# Patient Record
Sex: FEMALE | Race: WHITE | NOT HISPANIC OR LATINO | Employment: PART TIME | URBAN - METROPOLITAN AREA
[De-identification: names, ages, dates, MRNs, and addresses within clinical notes are randomized per-mention and may not be internally consistent; named-entity substitution may affect disease eponyms.]

---

## 2017-03-28 ENCOUNTER — HOSPITAL ENCOUNTER (OUTPATIENT)
Dept: RADIOLOGY | Facility: HOSPITAL | Age: 67
Discharge: HOME | End: 2017-03-28
Attending: OBSTETRICS & GYNECOLOGY
Payer: MEDICARE

## 2017-03-28 ENCOUNTER — GENERIC CONVERSION - ENCOUNTER (OUTPATIENT)
Dept: OTHER | Facility: OTHER | Age: 67
End: 2017-03-28

## 2017-03-28 DIAGNOSIS — Z12.31 ENCOUNTER FOR SCREENING MAMMOGRAM FOR BREAST CANCER: ICD-10-CM

## 2017-03-28 PROCEDURE — G0202 SCR MAMMO BI INCL CAD: HCPCS

## 2017-08-01 ENCOUNTER — GENERIC CONVERSION - ENCOUNTER (OUTPATIENT)
Dept: OTHER | Facility: OTHER | Age: 67
End: 2017-08-01

## 2018-01-11 NOTE — RESULT NOTES
Message   Recorded as Task   Date: 08/04/2016 11:31 AM, Created By: Sapphire Suggs   Task Name: Call Back   Assigned To: Anna Garcia   Regarding Patient: Elvis Munroe, Status: In Progress   Comment:    Kayy Lemus - 04 Aug 2016 11:31 AM     TASK CREATED  PT CALLED C/O "JOCK ITCH" BETWEEN HER LEGS X A FEW WKS  SHE DENIES ANY RASH BUT VERY ITCHING TO WHERE SHE WILL BLEED  HER # [phone]   Geovanny Gaspar - 04 Aug 2016 11:39 AM     TASK IN PROGRESS   Geovanny Gaspar - 72 Aug 2016 11:50 AM     TASK EDITED   Pt has irritation between legs and pubis - she wears tight clothes at gym  I told pt area needs to be dry and get air  Rx mycolog 11 cr, 30 gm sig tid between leg line and pubis  To [facility] [phone]        Signatures   Electronically signed by :  Alek Huitron, ; Aug  4 2016 11:50AM EST                       (Author)

## 2018-01-11 NOTE — RESULT NOTES
Verified Results  (1) THIN PREP PAP WITH IMAGING [id number] 12:00AM Alena Marie     Test Name Result Flag Reference   LAB AP CASE REPORT (Report)     Gynecologic Cytology Report            Case: [id number]                  Authorizing Provider: Romana Burgess MD     Collected:      03/14/2016           First Screen:     DANN Luther Received:      03/16/2016 1039        Specimen:  LIQUID-BASED PAP, SCREENING, Cervix   HPV HIGH RISK RESULT (Report)     HPV, High Risk: HPV NEG, HPV16 NEG, HPV18 NEG      Other High Risk HPV Negative, HPV 16 Negative, HPV 18 Negative  HPV types: 16,18,31,33,35,39,45,51,52,56,58,59,66 and 68 DNA are undetectable or below the pre-set threshold  [employer]'s FDA approved Ramo 4800 is utilized with strict adherence to the 's instruction  manual to test for the presence of High-Risk HPV DNA, as well as HPV 16 and HPV 18  This instrument  has been validated by our laboratory and/or by the   A negative result does not preclude the presence of HPV infection because results depend on adequate  specimen collection, absence of inhibitors and sufficient DNA to be detected  Additionally, HPV negative  results are not intended to prevent women from proceeding to colposcopy if clinically warranted  Positive HPV test results indicate the presence of any one or more of the high risk types, but since patients  are often co-infected with low-risk types it does not rule out the presence of low-risk types in patients  with mixed infections  LAB AP GYN PRIMARY INTERPRETATION      Negative for intraepithelial lesion or malignancy   LAB AP GYN SPECIMEN ADEQUACY      Satisfactory for evaluation  (See note)   LAB AP GYN NOTE      No endocervical cells identified  It is difficult to differentiate between   squamous metaplastic cells and parabasal cells in atrophic specimens due   to numerous causes including menopause, postpartum changes and   progestational agents  LAB AP GYN ADDITIONAL INFORMATION (Report)     [employer]'s FDA approved ,  and ThinPrep Imaging System are   utilized with strict adherence to the 's instruction manual to   prepare gynecologic and non-gynecologic cytology specimens for the   production of ThinPrep slides as well as for gynecologic ThinPrep imaging  These processes have been validated by our laboratory and/or by the     The Pap test is not a diagnostic procedure and should not be used as the   sole means to detect cervical cancer  It is only a screening procedure to   aid in the detection of cervical cancer and its precursors  Both   false-negative and false-positive results have been experienced  Your   patient's test result should be interpreted in this context together with   the history and clinical findings     LAB AP LMP

## 2018-01-13 NOTE — RESULT NOTES
Verified Results  * MAMMO SCREENING BILATERAL W CAD [id number] 10:02AM Antonio Leal     Test Name Result Flag Reference   MAMMO SCREENING BILATERAL W CAD (Report)     Patient History:   Patient is postmenopausal    No known family history of cancer  Benign excisional biopsy of the right breast, 1994  No Hormone Replacement Therapy   Patient has never smoked  Patient's BMI is 29 3  Reason for exam: screening, asymptomatic  Mammo Screening Bilateral W CAD: March 28, 2017 - Check In #:    [de-identified]   Bilateral MLO and CC view(s) were taken  XCCL view(s) were taken   of the left breast      Technologist: RT Dylan(R)(M)   Prior study comparison: April 21, 2015, bilateral digital    screening mammogram performed at [address] [place] [address]     February 18, 2014, bilateral digital screening mammogram    performed at [address] [place] [address]  December 19, 2012,    bilateral digital screening mammogram performed at [address] [place] [address]      There are scattered fibroglandular densities  No dominant soft tissue mass, architectural distortion or    suspicious calcifications are noted in either breast  The skin    and nipple contours are within normal limits  No evidence of malignancy  No significant changes when compared with prior studies  ACR BI-RADSï¾® Assessments: BiRad:1 - Negative     Recommendation:   Routine screening mammogram of both breasts in 1 year  A    reminder letter will be scheduled  Analyzed by CAD     8-10% of cancers will be missed on mammography  Management of a    palpable abnormality must be based on clinical grounds  Patients   will be notified of their results via letter from our facility  Accredited by [employer] of [employer] and FDA       [facility] [id number]     Risk Value(s):   Tyrer-Cuzick 10 Year: 2 500%, Tyrer-Cuzick Lifetime: 4 600%,    Myriad Table: 1 5%, JESUSITA 5 Year: 1 8%, NCI Lifetime: 5 8%   Signed by:   Susana Rebolledo MD   3/28/17

## 2018-07-30 RX ORDER — OMEPRAZOLE 20 MG/1
CAPSULE, DELAYED RELEASE ORAL
COMMUNITY
Start: 2016-04-21

## 2018-07-30 RX ORDER — LISINOPRIL AND HYDROCHLOROTHIAZIDE 20; 12.5 MG/1; MG/1
TABLET ORAL EVERY 24 HOURS
COMMUNITY
Start: 2018-04-27 | End: 2018-11-06 | Stop reason: SDUPTHER

## 2018-07-30 RX ORDER — LISINOPRIL 20 MG/1
TABLET ORAL EVERY 24 HOURS
COMMUNITY
Start: 2018-04-27 | End: 2018-11-06 | Stop reason: SDUPTHER

## 2018-07-30 RX ORDER — LISINOPRIL 5 MG/1
TABLET ORAL
COMMUNITY
End: 2018-08-03 | Stop reason: ALTCHOICE

## 2018-08-03 ENCOUNTER — OFFICE VISIT (OUTPATIENT)
Dept: CARDIOLOGY CLINIC | Facility: CLINIC | Age: 68
End: 2018-08-03
Payer: MEDICARE

## 2018-08-03 VITALS
DIASTOLIC BLOOD PRESSURE: 68 MMHG | OXYGEN SATURATION: 99 % | HEART RATE: 81 BPM | SYSTOLIC BLOOD PRESSURE: 120 MMHG | HEIGHT: 62 IN | BODY MASS INDEX: 30.59 KG/M2 | WEIGHT: 166.2 LBS

## 2018-08-03 DIAGNOSIS — R00.2 PALPITATION: ICD-10-CM

## 2018-08-03 DIAGNOSIS — E78.00 PURE HYPERCHOLESTEROLEMIA: ICD-10-CM

## 2018-08-03 DIAGNOSIS — I10 ESSENTIAL HYPERTENSION: ICD-10-CM

## 2018-08-03 DIAGNOSIS — R60.0 LOCALIZED EDEMA: ICD-10-CM

## 2018-08-03 DIAGNOSIS — I10 HYPERTENSION, UNSPECIFIED TYPE: Primary | ICD-10-CM

## 2018-08-03 PROBLEM — B36.0 PITYRIASIS VERSICOLOR: Status: ACTIVE | Noted: 2017-07-14

## 2018-08-03 PROCEDURE — 93000 ELECTROCARDIOGRAM COMPLETE: CPT | Performed by: INTERNAL MEDICINE

## 2018-08-03 PROCEDURE — 99214 OFFICE O/P EST MOD 30 MIN: CPT | Performed by: INTERNAL MEDICINE
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
## 2018-08-06 ENCOUNTER — OFFICE VISIT (OUTPATIENT)
Dept: FAMILY MEDICINE CLINIC | Facility: CLINIC | Age: 68
End: 2018-08-06
Payer: MEDICARE

## 2018-08-06 ENCOUNTER — TRANSCRIBE ORDERS (OUTPATIENT)
Dept: ADMINISTRATIVE | Facility: HOSPITAL | Age: 68
End: 2018-08-06

## 2018-08-06 VITALS
RESPIRATION RATE: 18 BRPM | HEIGHT: 62 IN | DIASTOLIC BLOOD PRESSURE: 70 MMHG | TEMPERATURE: 98 F | HEART RATE: 78 BPM | SYSTOLIC BLOOD PRESSURE: 122 MMHG | BODY MASS INDEX: 30.9 KG/M2 | WEIGHT: 167.9 LBS

## 2018-08-06 DIAGNOSIS — Z12.31 ENCOUNTER FOR SCREENING MAMMOGRAM FOR MALIGNANT NEOPLASM OF BREAST: Primary | ICD-10-CM

## 2018-08-06 DIAGNOSIS — K52.9 COLITIS: Primary | ICD-10-CM

## 2018-08-06 DIAGNOSIS — Z12.31 SCREENING MAMMOGRAM, ENCOUNTER FOR: ICD-10-CM

## 2018-08-06 PROCEDURE — 99496 TRANSJ CARE MGMT HIGH F2F 7D: CPT | Performed by: NURSE PRACTITIONER

## 2018-08-06 RX ORDER — METRONIDAZOLE 500 MG/1
500 TABLET ORAL EVERY 8 HOURS SCHEDULED
COMMUNITY
End: 2019-01-03 | Stop reason: ALTCHOICE

## 2018-08-06 NOTE — PATIENT INSTRUCTIONS
Diet for Stomach Ulcers and Gastritis   WHAT YOU NEED TO KNOW:   A diet for stomach ulcers and gastritis is a meal plan that limits foods that irritate your stomach  Certain foods may worsen symptoms such as stomach pain, bloating, heartburn, or indigestion  DISCHARGE INSTRUCTIONS:   Foods to limit or avoid:  You may need to avoid acidic, spicy, or high-fat foods  Not all foods affect everyone the same way  You will need to learn which foods worsen your symptoms and limit those foods  The following are some foods that may worsen ulcer or gastritis symptoms:  · Beverages:      ¨ Whole milk and chocolate milk    ¨ Hot cocoa and cola    ¨ Any beverage with caffeine    ¨ Regular and decaffeinated coffee    ¨ Peppermint and spearmint tea    ¨ Green and black tea, with or without caffeine    ¨ Orange and grapefruit juices    ¨ Drinks that contain alcohol    · Spices and seasonings:      ¨ Black and red pepper    ¨ Chili powder    ¨ Mustard seed and nutmeg    · Other foods:      ¨ Dairy foods made from whole milk or cream    ¨ Chocolate    ¨ Spicy or strongly flavored cheeses, such as jalapeno or black pepper    ¨ Highly seasoned, high-fat meats, such as sausage, salami, bills, ham, and cold cuts    ¨ Hot chiles and peppers    ¨ Tomato products, such as tomato paste, tomato sauce, or tomato juice  Foods to include:  Eat a variety of healthy foods from all the food groups  Eat fruits, vegetables, whole grains, and fat-free or low-fat dairy foods  Whole grains include whole-wheat breads, cereals, pasta, and brown rice  Choose lean meats, poultry (chicken and turkey), fish, beans, eggs, and nuts  A healthy meal plan is low in unhealthy fats, salt, and added sugar  Healthy fats include olive oil and canola oil  Ask your dietitian for more information about a healthy diet  Other helpful guidelines:   · Do not eat right before bedtime  Stop eating at least 2 hours before bedtime  · Eat small, frequent meals    Your stomach may tolerate small, frequent meals better than large meals  © 2017 [facility] Information is for End User's use only and may not be sold, redistributed or otherwise used for commercial purposes  All illustrations and images included in CareNotes® are the copyrighted property of A D A M , Inc  or Ashish Trevino  The above information is an  only  It is not intended as medical advice for individual conditions or treatments  Talk to your doctor, nurse or pharmacist before following any medical regimen to see if it is safe and effective for you

## 2018-08-06 NOTE — PROGRESS NOTES
Assessment/Plan:    No problem-specific Assessment & Plan notes found for this encounter  There are no diagnoses linked to this encounter  Subjective:   Chief Complaint   Patient presents with   [address] [place], [place] [address]        Patient ID: Wen Hernandez is a 69 y o  female  Presents today for post hospital follow-up of 8/5/2008 for complaints of diarrhea, nausea, vomiting, and generally not feeling well  CT of the abdomen and pelvis was unremarkable except for mild patchy wall thickening of the majority of the colon with fluid suggesting mild colitis and some mild patchy bibasilar opacities suggesting mild atelectasis  She was given Zofran for nausea and metronidazole  Nausea significantly better on the Zofran  No diarrhea stools noted today  She had a colonoscopy with EGD 4 years ago with no polyps or evidence of esophageal erosion  The following portions of the patient's history were reviewed and updated as appropriate: allergies, current medications, past family history, past medical history, past social history, past surgical history and problem list     Review of Systems   Constitutional: Negative for chills and fever  Respiratory: Negative for cough and shortness of breath  Cardiovascular: Negative for chest pain  Gastrointestinal: Positive for diarrhea (last diarrhea stool 8/5)  Negative for abdominal pain, blood in stool, constipation, nausea and vomiting  Psychiatric/Behavioral: Negative for dysphoric mood  The patient is not nervous/anxious  Objective:  Vitals:    08/06/18 1635   BP: 122/70   BP Location: Left arm   Patient Position: Sitting   Cuff Size: Standard   Pulse: 78   Resp: 18   Temp: 98 °F (36 7 °C)   TempSrc: Temporal   Weight: 76 2 kg (167 lb 14 4 oz)   Height: 5' 2" (1 575 m)      Physical Exam   Constitutional: She is oriented to person, place, and time  She appears well-developed and well-nourished     Cardiovascular: Normal rate and regular rhythm  Pulmonary/Chest: Effort normal and breath sounds normal    Abdominal: Soft  Bowel sounds are normal  She exhibits no distension and no mass  There is no tenderness  There is no rebound and no guarding  Neurological: She is alert and oriented to person, place, and time  Skin: Skin is warm and dry  Psychiatric: She has a normal mood and affect   Her behavior is normal

## 2018-08-07 ENCOUNTER — TELEPHONE (OUTPATIENT)
Dept: FAMILY MEDICINE CLINIC | Facility: CLINIC | Age: 68
End: 2018-08-07

## 2018-08-07 ENCOUNTER — CLINICAL SUPPORT (OUTPATIENT)
Dept: FAMILY MEDICINE CLINIC | Facility: CLINIC | Age: 68
End: 2018-08-07
Payer: MEDICARE

## 2018-08-07 DIAGNOSIS — R30.0 DYSURIA: Primary | ICD-10-CM

## 2018-08-07 LAB
SL AMB  POCT GLUCOSE, UA: ABNORMAL
SL AMB LEUKOCYTE ESTERASE,UA: ABNORMAL
SL AMB POCT BILIRUBIN,UA: ABNORMAL
SL AMB POCT BLOOD,UA: ABNORMAL
SL AMB POCT CLARITY,UA: CLEAR
SL AMB POCT COLOR,UA: ABNORMAL
SL AMB POCT KETONES,UA: ABNORMAL
SL AMB POCT NITRITE,UA: ABNORMAL
SL AMB POCT PH,UA: ABNORMAL
SL AMB POCT SPECIFIC GRAVITY,UA: 1020
SL AMB POCT URINE PROTEIN: ABNORMAL
SL AMB POCT UROBILINOGEN: ABNORMAL

## 2018-08-07 PROCEDURE — 87086 URINE CULTURE/COLONY COUNT: CPT | Performed by: NURSE PRACTITIONER

## 2018-08-07 PROCEDURE — 81002 URINALYSIS NONAUTO W/O SCOPE: CPT

## 2018-08-07 NOTE — TELEPHONE ENCOUNTER
Pt stopped by the office to give urine sample  Pt had two bowels today  Pt will also needs note to return to work  Note given for 3 days

## 2018-08-07 NOTE — TELEPHONE ENCOUNTER
Pt called stating that she needs an extended note for work she said that this morning her urine was dark and she is concern about having an infection and also she still has the diarrhea really bad

## 2018-08-09 LAB — BACTERIA UR CULT: NORMAL

## 2018-08-28 ENCOUNTER — HOSPITAL ENCOUNTER (OUTPATIENT)
Dept: RADIOLOGY | Facility: HOSPITAL | Age: 68
Discharge: HOME | End: 2018-08-28
Payer: MEDICARE

## 2018-08-28 DIAGNOSIS — Z12.31 SCREENING MAMMOGRAM, ENCOUNTER FOR: ICD-10-CM

## 2018-08-28 PROCEDURE — 77067 SCR MAMMO BI INCL CAD: CPT

## 2018-08-29 ENCOUNTER — TELEPHONE (OUTPATIENT)
Dept: FAMILY MEDICINE CLINIC | Facility: CLINIC | Age: 68
End: 2018-08-29

## 2018-08-29 NOTE — TELEPHONE ENCOUNTER
Patient called she said to cancel appointment for 8/30/18 at 1 am with dr Zena Briones that she isn't able to make it in for the appointment so it was cancelled per her request and rescheduled to 10/23/18 at 1030 am with dr Zena Briones

## 2018-10-23 ENCOUNTER — DOCTOR'S OFFICE (OUTPATIENT)
Dept: URBAN - METROPOLITAN AREA CLINIC 137 | Facility: CLINIC | Age: 68
Setting detail: OPHTHALMOLOGY
End: 2018-10-23
Payer: COMMERCIAL

## 2018-10-23 DIAGNOSIS — G43.809: ICD-10-CM

## 2018-10-23 DIAGNOSIS — H43.393: ICD-10-CM

## 2018-10-23 DIAGNOSIS — H43.811: ICD-10-CM

## 2018-10-23 DIAGNOSIS — D31.31: ICD-10-CM

## 2018-10-23 DIAGNOSIS — H25.13: ICD-10-CM

## 2018-10-23 PROBLEM — H43.813: Status: ACTIVE | Noted: 2018-10-23

## 2018-10-23 PROCEDURE — 99214 OFFICE O/P EST MOD 30 MIN: CPT | Performed by: OPTOMETRIST

## 2018-10-23 ASSESSMENT — REFRACTION_MANIFEST
OD_VA2: 20/
OU_VA: 20/
OD_VA1: 20/
OS_VA3: 20/
OD_VA3: 20/
OS_VA1: 20/
OS_VA1: 20/
OS_VA3: 20/
OS_VA2: 20/
OS_VA2: 20/
OD_VA1: 20/
OD_VA3: 20/
OD_VA2: 20/
OU_VA: 20/

## 2018-10-23 ASSESSMENT — REFRACTION_CURRENTRX
OD_OVR_VA: 20/
OD_OVR_VA: 20/
OS_OVR_VA: 20/
OS_OVR_VA: 20/
OD_OVR_VA: 20/
OS_OVR_VA: 20/

## 2018-10-23 ASSESSMENT — VISUAL ACUITY
OD_BCVA: 20/70
OS_BCVA: 20/30-2

## 2018-10-23 ASSESSMENT — CONFRONTATIONAL VISUAL FIELD TEST (CVF)
OD_FINDINGS: FULL
OS_FINDINGS: FULL

## 2018-10-23 ASSESSMENT — LID POSITION - DERMATOCHALASIS
OD_DERMATOCHALASIS: RUL 2+
OS_DERMATOCHALASIS: LUL 2+

## 2018-11-06 DIAGNOSIS — I10 HYPERTENSION, UNSPECIFIED TYPE: Primary | ICD-10-CM

## 2018-11-08 RX ORDER — LISINOPRIL AND HYDROCHLOROTHIAZIDE 20; 12.5 MG/1; MG/1
1 TABLET ORAL DAILY
Qty: 90 TABLET | Refills: 3 | Status: SHIPPED | OUTPATIENT
Start: 2018-11-08 | End: 2019-11-07 | Stop reason: SDUPTHER

## 2018-11-08 RX ORDER — LISINOPRIL 20 MG/1
20 TABLET ORAL EVERY 24 HOURS
Qty: 90 TABLET | Refills: 3 | Status: SHIPPED | OUTPATIENT
Start: 2018-11-08 | End: 2019-11-07 | Stop reason: SDUPTHER

## 2018-11-27 ENCOUNTER — ANNUAL EXAM (OUTPATIENT)
Dept: OBGYN CLINIC | Facility: CLINIC | Age: 68
End: 2018-11-27
Payer: MEDICARE

## 2018-11-27 VITALS
SYSTOLIC BLOOD PRESSURE: 126 MMHG | WEIGHT: 170 LBS | BODY MASS INDEX: 31.28 KG/M2 | DIASTOLIC BLOOD PRESSURE: 78 MMHG | HEIGHT: 62 IN

## 2018-11-27 DIAGNOSIS — Z01.419 ENCOUNTER FOR GYNECOLOGICAL EXAMINATION (GENERAL) (ROUTINE) WITHOUT ABNORMAL FINDINGS: ICD-10-CM

## 2018-11-27 DIAGNOSIS — Z12.31 ENCOUNTER FOR SCREENING MAMMOGRAM FOR MALIGNANT NEOPLASM OF BREAST: Primary | ICD-10-CM

## 2018-11-27 PROCEDURE — G0101 CA SCREEN;PELVIC/BREAST EXAM: HCPCS | Performed by: OBSTETRICS & GYNECOLOGY

## 2018-11-27 PROCEDURE — G0145 SCR C/V CYTO,THINLAYER,RESCR: HCPCS | Performed by: OBSTETRICS & GYNECOLOGY

## 2018-11-27 NOTE — PATIENT INSTRUCTIONS
This 25-year-old patient was told that her breast and pelvic exam are normal   She will call if she sees any vaginal bleeding over the next year

## 2018-11-27 NOTE — PROGRESS NOTES
Assessment/Plan: This 75-year-old patient is seen for annual gyn evaluation  She has no specific complaints at this time  No problem-specific Assessment & Plan notes found for this encounter  Subjective:      Patient ID: Durga Vasquez is a 77 y o  female  This 75-year-old patient has had no vaginal bleeding or episodes of vaginitis over the past year  Carrier is uncomfortable  She does use a lubricant  She occasionally notices itching in her right groin which does seem to respond to anti fungal cream   She occasionally has similar rash under her breasts and this also response to an antifungal cream   She survives in 3-4 hours of sleep at night  Bowel movements are normal and she does not have blood with the bowel movements  She does not lose urine when she laughs or coughs and does not wear liners or pads routinely  She has had no urinary tract infections over the year  Her weight is 170 lb  Her blood pressure is 126/78  In the past she has had a Pap smear which was positive for DNA a high risk human papilloma virus not 16 18  Gynecologic Exam         Review of Systems   Constitutional: Negative  HENT: Negative  Eyes: Negative  Respiratory: Negative  Cardiovascular: Negative  Gastrointestinal: Negative  Endocrine: Negative  Genitourinary: Negative  Musculoskeletal: Negative  Skin: Negative  Allergic/Immunologic: Negative  Neurological: Negative  Hematological: Negative  Psychiatric/Behavioral: Negative  Objective:      /78 (BP Location: Right arm, Patient Position: Sitting, Cuff Size: Large)   Ht 5' 2" (1 575 m)   Wt 77 1 kg (170 lb)   BMI 31 09 kg/m²          Physical Exam   Constitutional: She is oriented to person, place, and time  She appears well-developed and well-nourished  HENT:   Head: Normocephalic  Neck: Normal range of motion  Neck supple     Cardiovascular: Normal rate, regular rhythm, normal heart sounds and intact distal pulses  Pulmonary/Chest: Effort normal and breath sounds normal    Abdominal: Soft  Bowel sounds are normal    Genitourinary: Uterus normal    Musculoskeletal: Normal range of motion  Neurological: She is alert and oriented to person, place, and time  Skin: Skin is warm and dry  Psychiatric: She has a normal mood and affect  Nursing note and vitals reviewed  breast exam is normal   Pelvic exam shows uterus to be anterior mobile normal size and well supported  No adnexal masses are identified  The cervix is normal to appearance  There is no blood or discharge in the vagina  The vulva is normal   Vaginal exam shows atrophic changes  Rectal exam shows no  masses in the rectum and no nodularity in the cul-de-sac

## 2018-12-04 LAB
LAB AP GYN PRIMARY INTERPRETATION: NORMAL
Lab: NORMAL

## 2018-12-14 ENCOUNTER — HOSPITAL ENCOUNTER (OUTPATIENT)
Dept: NON INVASIVE DIAGNOSTICS | Facility: CLINIC | Age: 68
Discharge: HOME | End: 2018-12-14
Payer: MEDICARE

## 2018-12-14 ENCOUNTER — TELEPHONE (OUTPATIENT)
Dept: CARDIOLOGY CLINIC | Facility: CLINIC | Age: 68
End: 2018-12-14

## 2018-12-14 DIAGNOSIS — R00.2 PALPITATION: ICD-10-CM

## 2018-12-14 PROCEDURE — 93225 XTRNL ECG REC<48 HRS REC: CPT

## 2018-12-14 PROCEDURE — 93226 XTRNL ECG REC<48 HR SCAN A/R: CPT

## 2018-12-14 NOTE — TELEPHONE ENCOUNTER
Pt called asking if can wear holter for only 24 hours she states that she is going out of town, told her that if inconvenient should call to change when she is able to wear for  48 hours    This was originally ordered in August pt states will keep scheduled appt

## 2018-12-19 PROCEDURE — 93227 XTRNL ECG REC<48 HR R&I: CPT | Performed by: INTERNAL MEDICINE

## 2019-01-03 ENCOUNTER — OFFICE VISIT (OUTPATIENT)
Dept: FAMILY MEDICINE CLINIC | Facility: CLINIC | Age: 69
End: 2019-01-03
Payer: MEDICARE

## 2019-01-03 VITALS
HEIGHT: 62 IN | OXYGEN SATURATION: 96 % | BODY MASS INDEX: 31.56 KG/M2 | HEART RATE: 95 BPM | WEIGHT: 171.5 LBS | DIASTOLIC BLOOD PRESSURE: 74 MMHG | TEMPERATURE: 98 F | RESPIRATION RATE: 18 BRPM | SYSTOLIC BLOOD PRESSURE: 148 MMHG

## 2019-01-03 DIAGNOSIS — R10.11 RIGHT UPPER QUADRANT ABDOMINAL PAIN: Primary | ICD-10-CM

## 2019-01-03 PROBLEM — R10.31 RIGHT LOWER QUADRANT ABDOMINAL PAIN: Status: ACTIVE | Noted: 2019-01-03

## 2019-01-03 LAB
ALBUMIN SERPL BCP-MCNC: 3.5 G/DL (ref 3.5–5)
ALP SERPL-CCNC: 86 U/L (ref 46–116)
ALT SERPL W P-5'-P-CCNC: 20 U/L (ref 12–78)
AMYLASE SERPL-CCNC: 54 IU/L (ref 25–115)
ANION GAP SERPL CALCULATED.3IONS-SCNC: 10 MMOL/L (ref 4–13)
AST SERPL W P-5'-P-CCNC: 19 U/L (ref 5–45)
BASOPHILS # BLD AUTO: 0.04 THOUSANDS/ΜL (ref 0–0.1)
BASOPHILS NFR BLD AUTO: 1 % (ref 0–1)
BILIRUB SERPL-MCNC: 0.45 MG/DL (ref 0.2–1)
BUN SERPL-MCNC: 27 MG/DL (ref 5–25)
CALCIUM SERPL-MCNC: 9.5 MG/DL (ref 8.3–10.1)
CHLORIDE SERPL-SCNC: 106 MMOL/L (ref 100–108)
CO2 SERPL-SCNC: 25 MMOL/L (ref 21–32)
CREAT SERPL-MCNC: 1.18 MG/DL (ref 0.6–1.3)
EOSINOPHIL # BLD AUTO: 0.1 THOUSAND/ΜL (ref 0–0.61)
EOSINOPHIL NFR BLD AUTO: 2 % (ref 0–6)
ERYTHROCYTE [DISTWIDTH] IN BLOOD BY AUTOMATED COUNT: 12.6 % (ref 11.6–15.1)
GFR SERPL CREATININE-BSD FRML MDRD: 47 ML/MIN/1.73SQ M
GLUCOSE SERPL-MCNC: 87 MG/DL (ref 65–140)
HCT VFR BLD AUTO: 38 % (ref 34.8–46.1)
HGB BLD-MCNC: 12.2 G/DL (ref 11.5–15.4)
IMM GRANULOCYTES # BLD AUTO: 0.01 THOUSAND/UL (ref 0–0.2)
IMM GRANULOCYTES NFR BLD AUTO: 0 % (ref 0–2)
LIPASE SERPL-CCNC: 395 U/L (ref 73–393)
LYMPHOCYTES # BLD AUTO: 1.72 THOUSANDS/ΜL (ref 0.6–4.47)
LYMPHOCYTES NFR BLD AUTO: 28 % (ref 14–44)
MCH RBC QN AUTO: 28.2 PG (ref 26.8–34.3)
MCHC RBC AUTO-ENTMCNC: 32.1 G/DL (ref 31.4–37.4)
MCV RBC AUTO: 88 FL (ref 82–98)
MONOCYTES # BLD AUTO: 0.41 THOUSAND/ΜL (ref 0.17–1.22)
MONOCYTES NFR BLD AUTO: 7 % (ref 4–12)
NEUTROPHILS # BLD AUTO: 3.79 THOUSANDS/ΜL (ref 1.85–7.62)
NEUTS SEG NFR BLD AUTO: 62 % (ref 43–75)
NRBC BLD AUTO-RTO: 0 /100 WBCS
PLATELET # BLD AUTO: 286 THOUSANDS/UL (ref 149–390)
PMV BLD AUTO: 9.7 FL (ref 8.9–12.7)
POTASSIUM SERPL-SCNC: 4.8 MMOL/L (ref 3.5–5.3)
PROT SERPL-MCNC: 7.4 G/DL (ref 6.4–8.2)
RBC # BLD AUTO: 4.32 MILLION/UL (ref 3.81–5.12)
SL AMB  POCT GLUCOSE, UA: NEGATIVE
SL AMB LEUKOCYTE ESTERASE,UA: NEGATIVE
SL AMB POCT BILIRUBIN,UA: NEGATIVE
SL AMB POCT BLOOD,UA: NEGATIVE
SL AMB POCT CLARITY,UA: CLEAR
SL AMB POCT COLOR,UA: YELLOW
SL AMB POCT KETONES,UA: NEGATIVE
SL AMB POCT NITRITE,UA: NEGATIVE
SL AMB POCT PH,UA: 5
SL AMB POCT SPECIFIC GRAVITY,UA: 1.01
SL AMB POCT URINE PROTEIN: NEGATIVE
SL AMB POCT UROBILINOGEN: NORMAL
SODIUM SERPL-SCNC: 141 MMOL/L (ref 136–145)
WBC # BLD AUTO: 6.07 THOUSAND/UL (ref 4.31–10.16)

## 2019-01-03 PROCEDURE — 36415 COLL VENOUS BLD VENIPUNCTURE: CPT | Performed by: FAMILY MEDICINE

## 2019-01-03 PROCEDURE — 83690 ASSAY OF LIPASE: CPT | Performed by: FAMILY MEDICINE

## 2019-01-03 PROCEDURE — 80053 COMPREHEN METABOLIC PANEL: CPT | Performed by: FAMILY MEDICINE

## 2019-01-03 PROCEDURE — 99214 OFFICE O/P EST MOD 30 MIN: CPT | Performed by: FAMILY MEDICINE

## 2019-01-03 PROCEDURE — 82150 ASSAY OF AMYLASE: CPT | Performed by: FAMILY MEDICINE

## 2019-01-03 PROCEDURE — 81002 URINALYSIS NONAUTO W/O SCOPE: CPT | Performed by: FAMILY MEDICINE

## 2019-01-03 PROCEDURE — 85025 COMPLETE CBC W/AUTO DIFF WBC: CPT | Performed by: FAMILY MEDICINE

## 2019-01-03 NOTE — ASSESSMENT & PLAN NOTE
Started one week ago  Stabbing pain, very quick, comes and goes  Not a pain at night  No trauma  Normal BMs  Had dysuria yesterday but no frequency or urgency and no fever  Had colonoscopy, 4 years ago and was clear  No nausea, vomiting or diarrhea

## 2019-01-03 NOTE — PATIENT INSTRUCTIONS
She has no abdominal pain on exam today  The discomfort comes and goes and is sticking type pain  She is up-to-date on colonoscopy and urinalysis was normal today  Her bowels have been normal   She requires no treatment but we will get an ultrasound of the abdomen and also checking labs today  I will call her with those results and she will call us if her pain worsens or changes  If her abdominal pain gets quite severe she needs to go to the emergency room  I will see her back in 2 weeks for re-evaluation

## 2019-01-03 NOTE — PROGRESS NOTES
Assessment/Plan:    Right lower quadrant abdominal pain  Started one week ago  Stabbing pain, very quick, comes and goes  Not a pain at night  No trauma  Normal BMs  Had dysuria yesterday but no frequency or urgency and no fever  Had colonoscopy, 4 years ago and was clear  No nausea, vomiting or diarrhea  Diagnoses and all orders for this visit:    Right upper quadrant abdominal pain  -     POCT urine dip          Subjective:     Chief Complaint   Patient presents with    Abdominal Pain        Patient ID: Laura Rbuio is a 77 y o  female  HPI : abdominal pain - right side    The following portions of the patient's history were reviewed and updated as appropriate: allergies, current medications, past family history, past medical history, past social history, past surgical history and problem list     Review of Systems   Constitutional: Negative for activity change, appetite change, fatigue, fever and unexpected weight change  HENT: Negative for congestion, dental problem and sneezing  Eyes: Negative for discharge and visual disturbance  Respiratory: Negative for cough and wheezing  Cardiovascular: Negative for chest pain, palpitations and leg swelling  Gastrointestinal: Positive for abdominal pain  Negative for abdominal distention, anal bleeding, blood in stool, constipation, diarrhea, nausea and vomiting  Endocrine: Negative for polydipsia and polyuria  Genitourinary: Positive for dysuria  Negative for frequency and urgency  Musculoskeletal: Negative for arthralgias and back pain  Skin: Negative for rash  Allergic/Immunologic: Negative for environmental allergies and food allergies  Neurological: Negative for headaches  Hematological: Negative for adenopathy  Psychiatric/Behavioral: Negative for behavioral problems and sleep disturbance           Objective:  Vitals:    01/03/19 0941   BP: 148/74   BP Location: Left arm   Patient Position: Sitting   Cuff Size: Standard Pulse: 95   Resp: 18   Temp: 98 °F (36 7 °C)   TempSrc: Temporal   SpO2: 96%   Weight: 77 8 kg (171 lb 8 oz)   Height: 5' 2" (1 575 m)      Physical Exam   Constitutional: She is oriented to person, place, and time  She appears well-developed and well-nourished  HENT:   Head: Normocephalic  Right Ear: External ear normal    Left Ear: External ear normal    Nose: Nose normal    Eyes: Pupils are equal, round, and reactive to light  Conjunctivae are normal  Right eye exhibits no discharge  Left eye exhibits no discharge  Neck: Normal range of motion  Neck supple  No thyromegaly present  Cardiovascular: Normal rate, regular rhythm and normal heart sounds  No murmur heard  Pulmonary/Chest: Effort normal and breath sounds normal    Abdominal: Soft  Bowel sounds are normal  She exhibits no distension and no mass  There is no tenderness  There is no rebound and no guarding  Musculoskeletal: Normal range of motion  Lymphadenopathy:     She has no cervical adenopathy  Neurological: She is alert and oriented to person, place, and time  Skin: Skin is warm  No rash noted  Psychiatric: She has a normal mood and affect  Her behavior is normal        Patient Instructions   She has no abdominal pain on exam today  The discomfort comes and goes and is sticking type pain  She is up-to-date on colonoscopy and urinalysis was normal today  Her bowels have been normal   She requires no treatment but we will get an ultrasound of the abdomen and also checking labs today  I will call her with those results and she will call us if her pain worsens or changes  If her abdominal pain gets quite severe she needs to go to the emergency room  I will see her back in 2 weeks for re-evaluation

## 2019-01-07 PROBLEM — R60.0 LOWER EXTREMITY EDEMA: Status: ACTIVE | Noted: 2019-01-07

## 2019-01-18 ENCOUNTER — TELEPHONE (OUTPATIENT)
Dept: FAMILY MEDICINE CLINIC | Facility: CLINIC | Age: 69
End: 2019-01-18

## 2019-01-18 NOTE — TELEPHONE ENCOUNTER
Patient called she said to cancel her appointment on 1/24/19 1030 am with dr Tara Diaz and reschedule to another day so the appointment was cancelled and rescheduled to 2/14/19 at 11 am with dr Tara Diaz per pt request to change the appointment

## 2019-02-07 ENCOUNTER — OFFICE VISIT (OUTPATIENT)
Dept: CARDIOLOGY CLINIC | Facility: CLINIC | Age: 69
End: 2019-02-07
Payer: MEDICARE

## 2019-02-07 VITALS
HEIGHT: 62 IN | DIASTOLIC BLOOD PRESSURE: 82 MMHG | BODY MASS INDEX: 30.91 KG/M2 | OXYGEN SATURATION: 98 % | WEIGHT: 168 LBS | HEART RATE: 70 BPM | SYSTOLIC BLOOD PRESSURE: 130 MMHG

## 2019-02-07 DIAGNOSIS — E78.5 HYPERLIPIDEMIA, UNSPECIFIED HYPERLIPIDEMIA TYPE: ICD-10-CM

## 2019-02-07 DIAGNOSIS — I10 ESSENTIAL HYPERTENSION: ICD-10-CM

## 2019-02-07 DIAGNOSIS — I47.1 SVT (SUPRAVENTRICULAR TACHYCARDIA) (HCC): Primary | ICD-10-CM

## 2019-02-07 PROBLEM — I47.10 SVT (SUPRAVENTRICULAR TACHYCARDIA): Status: ACTIVE | Noted: 2019-02-07

## 2019-02-07 PROCEDURE — 99214 OFFICE O/P EST MOD 30 MIN: CPT | Performed by: INTERNAL MEDICINE
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
## 2019-02-11 ENCOUNTER — TRANSCRIBE ORDERS (OUTPATIENT)
Dept: ADMINISTRATIVE | Facility: HOSPITAL | Age: 69
End: 2019-02-11

## 2019-02-11 DIAGNOSIS — R10.11 ABDOMINAL PAIN, RIGHT UPPER QUADRANT: Primary | ICD-10-CM

## 2019-02-18 ENCOUNTER — HOSPITAL ENCOUNTER (OUTPATIENT)
Dept: ULTRASOUND IMAGING | Facility: HOSPITAL | Age: 69
Discharge: HOME | End: 2019-02-18
Payer: MEDICARE

## 2019-02-18 ENCOUNTER — APPOINTMENT (OUTPATIENT)
Dept: LAB | Facility: CLINIC | Age: 69
End: 2019-02-18
Payer: MEDICARE

## 2019-02-18 DIAGNOSIS — R10.11 ABDOMINAL PAIN, RIGHT UPPER QUADRANT: ICD-10-CM

## 2019-02-18 DIAGNOSIS — E78.5 HYPERLIPIDEMIA, UNSPECIFIED HYPERLIPIDEMIA TYPE: ICD-10-CM

## 2019-02-18 LAB
CHOLEST SERPL-MCNC: 168 MG/DL (ref 50–200)
HDLC SERPL-MCNC: 59 MG/DL (ref 40–60)
LDLC SERPL CALC-MCNC: 99 MG/DL (ref 0–100)
TRIGL SERPL-MCNC: 50 MG/DL

## 2019-02-18 PROCEDURE — 36415 COLL VENOUS BLD VENIPUNCTURE: CPT

## 2019-02-18 PROCEDURE — 76700 US EXAM ABDOM COMPLETE: CPT

## 2019-02-18 PROCEDURE — 80061 LIPID PANEL: CPT

## 2019-09-30 ENCOUNTER — HOSPITAL ENCOUNTER (OUTPATIENT)
Dept: RADIOLOGY | Age: 69
Discharge: HOME | End: 2019-09-30
Payer: MEDICARE

## 2019-09-30 VITALS — WEIGHT: 160 LBS | BODY MASS INDEX: 29.44 KG/M2 | HEIGHT: 62 IN

## 2019-09-30 DIAGNOSIS — Z12.31 ENCOUNTER FOR SCREENING MAMMOGRAM FOR MALIGNANT NEOPLASM OF BREAST: ICD-10-CM

## 2019-09-30 PROCEDURE — 77067 SCR MAMMO BI INCL CAD: CPT

## 2019-09-30 PROCEDURE — 77063 BREAST TOMOSYNTHESIS BI: CPT

## 2019-11-07 DIAGNOSIS — I10 HYPERTENSION, UNSPECIFIED TYPE: ICD-10-CM

## 2019-11-08 RX ORDER — LISINOPRIL AND HYDROCHLOROTHIAZIDE 20; 12.5 MG/1; MG/1
1 TABLET ORAL DAILY
Qty: 90 TABLET | Refills: 2 | Status: SHIPPED | OUTPATIENT
Start: 2019-11-08 | End: 2020-08-31 | Stop reason: SDUPTHER

## 2019-11-08 RX ORDER — LISINOPRIL 20 MG/1
20 TABLET ORAL EVERY 24 HOURS
Qty: 90 TABLET | Refills: 2 | Status: SHIPPED | OUTPATIENT
Start: 2019-11-08 | End: 2020-08-31 | Stop reason: SDUPTHER

## 2019-12-04 ENCOUNTER — OFFICE VISIT (OUTPATIENT)
Dept: CARDIOLOGY CLINIC | Facility: CLINIC | Age: 69
End: 2019-12-04
Payer: MEDICARE

## 2019-12-04 VITALS
HEIGHT: 62 IN | HEART RATE: 79 BPM | SYSTOLIC BLOOD PRESSURE: 130 MMHG | BODY MASS INDEX: 31.1 KG/M2 | WEIGHT: 169 LBS | DIASTOLIC BLOOD PRESSURE: 60 MMHG

## 2019-12-04 DIAGNOSIS — I47.1 SVT (SUPRAVENTRICULAR TACHYCARDIA) (HCC): ICD-10-CM

## 2019-12-04 DIAGNOSIS — R00.0 TACHYCARDIA: Primary | ICD-10-CM

## 2019-12-04 DIAGNOSIS — I10 ESSENTIAL HYPERTENSION: ICD-10-CM

## 2019-12-04 DIAGNOSIS — E78.2 MIXED HYPERLIPIDEMIA: ICD-10-CM

## 2019-12-04 PROCEDURE — 93000 ELECTROCARDIOGRAM COMPLETE: CPT | Performed by: INTERNAL MEDICINE

## 2019-12-04 PROCEDURE — 99214 OFFICE O/P EST MOD 30 MIN: CPT | Performed by: INTERNAL MEDICINE
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
## 2020-02-18 ENCOUNTER — DOCUMENTATION (OUTPATIENT)
Dept: FAMILY MEDICINE CLINIC | Facility: CLINIC | Age: 70
End: 2020-02-18

## 2020-03-11 ENCOUNTER — TELEPHONE (OUTPATIENT)
Dept: FAMILY MEDICINE CLINIC | Facility: CLINIC | Age: 70
End: 2020-03-11

## 2020-03-31 ENCOUNTER — TELEPHONE (OUTPATIENT)
Dept: FAMILY MEDICINE CLINIC | Facility: CLINIC | Age: 70
End: 2020-03-31

## 2020-03-31 ENCOUNTER — DOCUMENTATION (OUTPATIENT)
Dept: FAMILY MEDICINE CLINIC | Facility: CLINIC | Age: 70
End: 2020-03-31

## 2020-03-31 ENCOUNTER — TELEPHONE (OUTPATIENT)
Dept: CARDIOLOGY CLINIC | Facility: CLINIC | Age: 70
End: 2020-03-31

## 2020-03-31 NOTE — TELEPHONE ENCOUNTER
Pt called just wanting to speak with you about the covid and her job  pls call her she states that she never gets the message      [phone]

## 2020-03-31 NOTE — TELEPHONE ENCOUNTER
Spoke with patient - requesting a letter stating that patient is over 70years old and has medical condition that will require her to self quarantine for 30 days  Patientr works at [employer] and was given advised to call family doctor and ask for letter so that she will get paid on the days that she will off   Please advise

## 2020-03-31 NOTE — LETTER
March 31, 2020     Patient: Hiren Boston   YOB: 1950   Date of Visit: 3/31/2020       To Whom It May Concern:       Above mentioned patient Joe Estrada, date of birth [birth year] is currently under my medical care for years  Gianfranco Bethea is 68years old and has multiple medical conditions that requires her for self quarantine for 30 days  Thank you very much in advance for your consideration regarding this letter  If you have any questions or concerns, feel free to contact our office      Sincerely,        Kandice Riggins MD

## 2020-03-31 NOTE — TELEPHONE ENCOUNTER
Pt called and asking for letter stating she is at higher risk for Corona virus due to htn, lymes disease, and age  She works at [employer]  Suggested she called her PCP as well, states she left message there too  Please advise

## 2020-04-07 ENCOUNTER — TELEPHONE (OUTPATIENT)
Dept: FAMILY MEDICINE CLINIC | Facility: CLINIC | Age: 70
End: 2020-04-07

## 2020-08-14 ENCOUNTER — TELEPHONE (OUTPATIENT)
Dept: INTERNAL MEDICINE CLINIC | Facility: CLINIC | Age: 70
End: 2020-08-14

## 2020-08-14 ENCOUNTER — OFFICE VISIT (OUTPATIENT)
Dept: INTERNAL MEDICINE CLINIC | Facility: CLINIC | Age: 70
End: 2020-08-14
Payer: MEDICARE

## 2020-08-14 VITALS
SYSTOLIC BLOOD PRESSURE: 112 MMHG | OXYGEN SATURATION: 98 % | HEART RATE: 68 BPM | DIASTOLIC BLOOD PRESSURE: 64 MMHG | HEIGHT: 61 IN | BODY MASS INDEX: 30.55 KG/M2 | WEIGHT: 161.8 LBS | TEMPERATURE: 97.8 F

## 2020-08-14 DIAGNOSIS — Z00.00 MEDICARE ANNUAL WELLNESS VISIT, INITIAL: ICD-10-CM

## 2020-08-14 DIAGNOSIS — R30.0 DYSURIA: ICD-10-CM

## 2020-08-14 DIAGNOSIS — E78.2 MIXED HYPERLIPIDEMIA: ICD-10-CM

## 2020-08-14 DIAGNOSIS — R60.0 LOWER EXTREMITY EDEMA: ICD-10-CM

## 2020-08-14 DIAGNOSIS — Z12.31 ENCOUNTER FOR SCREENING MAMMOGRAM FOR MALIGNANT NEOPLASM OF BREAST: ICD-10-CM

## 2020-08-14 DIAGNOSIS — Z11.59 NEED FOR HEPATITIS C SCREENING TEST: Primary | ICD-10-CM

## 2020-08-14 DIAGNOSIS — I10 ESSENTIAL HYPERTENSION: ICD-10-CM

## 2020-08-14 DIAGNOSIS — K21.9 GASTROESOPHAGEAL REFLUX DISEASE WITHOUT ESOPHAGITIS: ICD-10-CM

## 2020-08-14 DIAGNOSIS — R13.19 ESOPHAGEAL DYSPHAGIA: ICD-10-CM

## 2020-08-14 LAB
BILIRUB UR QL STRIP: NEGATIVE
CLARITY UR: CLEAR
COLOR UR: YELLOW
GLUCOSE UR STRIP-MCNC: NEGATIVE MG/DL
HGB UR QL STRIP.AUTO: NEGATIVE
KETONES UR STRIP-MCNC: NEGATIVE MG/DL
LEUKOCYTE ESTERASE UR QL STRIP: NEGATIVE
NITRITE UR QL STRIP: NEGATIVE
PH UR STRIP.AUTO: 6 [PH]
PROT UR STRIP-MCNC: NEGATIVE MG/DL
SL AMB  POCT GLUCOSE, UA: NEGATIVE
SL AMB LEUKOCYTE ESTERASE,UA: NEGATIVE
SL AMB POCT BILIRUBIN,UA: NEGATIVE
SL AMB POCT BLOOD,UA: NEGATIVE
SL AMB POCT CLARITY,UA: CLEAR
SL AMB POCT COLOR,UA: YELLOW
SL AMB POCT KETONES,UA: NEGATIVE
SL AMB POCT NITRITE,UA: NEGATIVE
SL AMB POCT PH,UA: 5.5
SL AMB POCT SPECIFIC GRAVITY,UA: 1.01
SL AMB POCT URINE PROTEIN: NEGATIVE
SL AMB POCT UROBILINOGEN: 0.2
SP GR UR STRIP.AUTO: 1.01 (ref 1–1.03)
UROBILINOGEN UR QL STRIP.AUTO: 0.2 E.U./DL

## 2020-08-14 PROCEDURE — 1036F TOBACCO NON-USER: CPT | Performed by: INTERNAL MEDICINE

## 2020-08-14 PROCEDURE — 3078F DIAST BP <80 MM HG: CPT | Performed by: INTERNAL MEDICINE

## 2020-08-14 PROCEDURE — 3074F SYST BP LT 130 MM HG: CPT | Performed by: INTERNAL MEDICINE

## 2020-08-14 PROCEDURE — 81003 URINALYSIS AUTO W/O SCOPE: CPT | Performed by: INTERNAL MEDICINE

## 2020-08-14 PROCEDURE — 1125F AMNT PAIN NOTED PAIN PRSNT: CPT | Performed by: INTERNAL MEDICINE

## 2020-08-14 PROCEDURE — G0438 PPPS, INITIAL VISIT: HCPCS | Performed by: INTERNAL MEDICINE

## 2020-08-14 PROCEDURE — 1170F FXNL STATUS ASSESSED: CPT | Performed by: INTERNAL MEDICINE

## 2020-08-14 PROCEDURE — 1160F RVW MEDS BY RX/DR IN RCRD: CPT | Performed by: INTERNAL MEDICINE

## 2020-08-14 PROCEDURE — 99204 OFFICE O/P NEW MOD 45 MIN: CPT | Performed by: INTERNAL MEDICINE

## 2020-08-14 PROCEDURE — 3008F BODY MASS INDEX DOCD: CPT | Performed by: INTERNAL MEDICINE

## 2020-08-14 NOTE — TELEPHONE ENCOUNTER
Patient stated that she will call and make her 2 month follow up since she doesn't know her work schedule and she would call us instead of rescheduling it due to work
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
## 2020-08-14 NOTE — PATIENT INSTRUCTIONS
Medicare Preventive Visit Patient Instructions  Thank you for completing your Welcome to Medicare Visit or Medicare Annual Wellness Visit today  Your next wellness visit will be due in one year (8/14/2021)  The screening/preventive services that you may require over the next 5-10 years are detailed below  Some tests may not apply to you based off risk factors and/or age  Screening tests ordered at today's visit but not completed yet may show as past due  Also, please note that scanned in results may not display below  Preventive Screenings:  Service Recommendations Previous Testing/Comments   Colorectal Cancer Screening  * Colonoscopy    * Fecal Occult Blood Test (FOBT)/Fecal Immunochemical Test (FIT)  * Fecal DNA/Cologuard Test  * Flexible Sigmoidoscopy Age: 54-65 years old   Colonoscopy: every 10 years (may be performed more frequently if at higher risk)  OR  FOBT/FIT: every 1 year  OR  Cologuard: every 3 years  OR  Sigmoidoscopy: every 5 years  Screening may be recommended earlier than age 48 if at higher risk for colorectal cancer  Also, an individualized decision between you and your healthcare provider will decide whether screening between the ages of 74-80 would be appropriate  Colonoscopy: 04/07/2014  FOBT/FIT: Not on file  Cologuard: Not on file  Sigmoidoscopy: Not on file    Screening Current     Breast Cancer Screening Age: 36 years old  Frequency: every 1-2 years  Not required if history of left and right mastectomy Mammogram: 09/30/2019    Screening Current   Cervical Cancer Screening Between the ages of 21-29, pap smear recommended once every 3 years  Between the ages of 33-67, can perform pap smear with HPV co-testing every 5 years     Recommendations may differ for women with a history of total hysterectomy, cervical cancer, or abnormal pap smears in past  Pap Smear: 11/27/2018    Screening Not Indicated   Hepatitis C Screening Once for adults born between 1945 and 1965  More frequently in patients at high risk for Hepatitis C Hep C Antibody: Not on file       Diabetes Screening 1-2 times per year if you're at risk for diabetes or have pre-diabetes Fasting glucose: No results in last 5 years   A1C: No results in last 5 years       Cholesterol Screening Once every 5 years if you don't have a lipid disorder  May order more often based on risk factors  Lipid panel: 02/18/2019    Screening Not Indicated  History Lipid Disorder     Other Preventive Screenings Covered by Medicare:  1  Abdominal Aortic Aneurysm (AAA) Screening: covered once if your at risk  You're considered to be at risk if you have a family history of AAA  2  Lung Cancer Screening: covers low dose CT scan once per year if you meet all of the following conditions: (1) Age 50-69; (2) No signs or symptoms of lung cancer; (3) Current smoker or have quit smoking within the last 15 years; (4) You have a tobacco smoking history of at least 30 pack years (packs per day multiplied by number of years you smoked); (5) You get a written order from a healthcare provider  3  Glaucoma Screening: covered annually if you're considered high risk: (1) You have diabetes OR (2) Family history of glaucoma OR (3)  aged 48 and older OR (3)  American aged 72 and older  3  Osteoporosis Screening: covered every 2 years if you meet one of the following conditions: (1) You're estrogen deficient and at risk for osteoporosis based off medical history and other findings; (2) Have a vertebral abnormality; (3) On glucocorticoid therapy for more than 3 months; (4) Have primary hyperparathyroidism; (5) On osteoporosis medications and need to assess response to drug therapy  · Last bone density test (DXA Scan): Not on file  5  HIV Screening: covered annually if you're between the age of 12-76  Also covered annually if you are younger than 13 and older than 72 with risk factors for HIV infection   For pregnant patients, it is covered up to 3 times per pregnancy  Immunizations:  Immunization Recommendations   Influenza Vaccine Annual influenza vaccination during flu season is recommended for all persons aged >= 6 months who do not have contraindications   Pneumococcal Vaccine (Prevnar and Pneumovax)  * Prevnar = PCV13  * Pneumovax = PPSV23   Adults 25-60 years old: 1-3 doses may be recommended based on certain risk factors  Adults 72 years old: Prevnar (PCV13) vaccine recommended followed by Pneumovax (PPSV23) vaccine  If already received PPSV23 since turning 65, then PCV13 recommended at least one year after PPSV23 dose  Hepatitis B Vaccine 3 dose series if at intermediate or high risk (ex: diabetes, end stage renal disease, liver disease)   Tetanus (Td) Vaccine - COST NOT COVERED BY MEDICARE PART B Following completion of primary series, a booster dose should be given every 10 years to maintain immunity against tetanus  Td may also be given as tetanus wound prophylaxis  Tdap Vaccine - COST NOT COVERED BY MEDICARE PART B Recommended at least once for all adults  For pregnant patients, recommended with each pregnancy  Shingles Vaccine (Shingrix) - COST NOT COVERED BY MEDICARE PART B  2 shot series recommended in those aged 48 and above     Health Maintenance Due:      Topic Date Due    Hepatitis C Screening  1950    MAMMOGRAM  09/30/2020     Immunizations Due:      Topic Date Due    DTaP,Tdap,and Td Vaccines (1 - Tdap) 09/10/1969    Pneumococcal Vaccine: 65+ Years (1 of 1 - PPSV23) 09/10/2013    Influenza Vaccine  07/01/2020     Advance Directives   What are advance directives? Advance directives are legal documents that state your wishes and plans for medical care  These plans are made ahead of time in case you lose your ability to make decisions for yourself  Advance directives can apply to any medical decision, such as the treatments you want, and if you want to donate organs  What are the types of advance directives?   There are many types of advance directives, and each state has rules about how to use them  You may choose a combination of any of the following:  · Living will: This is a written record of the treatment you want  You can also choose which treatments you do not want, which to limit, and which to stop at a certain time  This includes surgery, medicine, IV fluid, and tube feedings  · Durable power of  for healthcare [facility]): This is a written record that states who you want to make healthcare choices for you when you are unable to make them for yourself  This person, called a proxy, is usually a family member or a friend  You may choose more than 1 proxy  · Do not resuscitate (DNR) order:  A DNR order is used in case your heart stops beating or you stop breathing  It is a request not to have certain forms of treatment, such as CPR  A DNR order may be included in other types of advance directives  · Medical directive: This covers the care that you want if you are in a coma, near death, or unable to make decisions for yourself  You can list the treatments you want for each condition  Treatment may include pain medicine, surgery, blood transfusions, dialysis, IV or tube feedings, and a ventilator (breathing machine)  · Values history: This document has questions about your views, beliefs, and how you feel and think about life  This information can help others choose the care that you would choose  Why are advance directives important? An advance directive helps you control your care  Although spoken wishes may be used, it is better to have your wishes written down  Spoken wishes can be misunderstood, or not followed  Treatments may be given even if you do not want them  An advance directive may make it easier for your family to make difficult choices about your care  Fall Prevention    Fall prevention  includes ways to make your home and other areas safer   It also includes ways you can move more carefully to prevent a fall  Health conditions that cause changes in your blood pressure, vision, or muscle strength and coordination may increase your risk for falls  Medicines may also increase your risk for falls if they make you dizzy, weak, or sleepy  Fall prevention tips:   · Stand or sit up slowly  · Use assistive devices as directed  · Wear shoes that fit well and have soles that   · Wear a personal alarm  · Stay active  · Manage your medical conditions  Home Safety Tips:  · Add items to prevent falls in the bathroom  · Keep paths clear  · Install bright lights in your home  · Keep items you use often on shelves within reach  · Paint or place reflective tape on the edges of your stairs  Weight Management   Why it is important to manage your weight:  Being overweight increases your risk of health conditions such as heart disease, high blood pressure, type 2 diabetes, and certain types of cancer  It can also increase your risk for osteoarthritis, sleep apnea, and other respiratory problems  Aim for a slow, steady weight loss  Even a small amount of weight loss can lower your risk of health problems  How to lose weight safely:  A safe and healthy way to lose weight is to eat fewer calories and get regular exercise  You can lose up about 1 pound a week by decreasing the number of calories you eat by 500 calories each day  Healthy meal plan for weight management:  A healthy meal plan includes a variety of foods, contains fewer calories, and helps you stay healthy  A healthy meal plan includes the following:  · Eat whole-grain foods more often  A healthy meal plan should contain fiber  Fiber is the part of grains, fruits, and vegetables that is not broken down by your body  Whole-grain foods are healthy and provide extra fiber in your diet  Some examples of whole-grain foods are whole-wheat breads and pastas, oatmeal, brown rice, and bulgur  · Eat a variety of vegetables every day  Include dark, leafy greens such as spinach, kale, silvana greens, and mustard greens  Eat yellow and orange vegetables such as carrots, sweet potatoes, and winter squash  · Eat a variety of fruits every day  Choose fresh or canned fruit (canned in its own juice or light syrup) instead of juice  Fruit juice has very little or no fiber  · Eat low-fat dairy foods  Drink fat-free (skim) milk or 1% milk  Eat fat-free yogurt and low-fat cottage cheese  Try low-fat cheeses such as mozzarella and other reduced-fat cheeses  · Choose meat and other protein foods that are low in fat  Choose beans or other legumes such as split peas or lentils  Choose fish, skinless poultry (chicken or turkey), or lean cuts of red meat (beef or pork)  Before you cook meat or poultry, cut off any visible fat  · Use less fat and oil  Try baking foods instead of frying them  Add less fat, such as margarine, sour cream, regular salad dressing and mayonnaise to foods  Eat fewer high-fat foods  Some examples of high-fat foods include french fries, doughnuts, ice cream, and cakes  · Eat fewer sweets  Limit foods and drinks that are high in sugar  This includes candy, cookies, regular soda, and sweetened drinks  Exercise:  Exercise at least 30 minutes per day on most days of the week  Some examples of exercise include walking, biking, dancing, and swimming  You can also fit in more physical activity by taking the stairs instead of the elevator or parking farther away from stores  Ask your healthcare provider about the best exercise plan for you  © Copyright [employer] 2018 Information is for End User's use only and may not be sold, redistributed or otherwise used for commercial purposes   All illustrations and images included in CareNotes® are the copyrighted property of [employer]  or [employer]

## 2020-08-14 NOTE — ASSESSMENT & PLAN NOTE
Patient is up-to-date with age-appropriate screenings  She does some vaccines  She is not up-to-date with tetanus and diphtheria for pneumonia vaccine and should have annual influenza vaccine

## 2020-08-28 ENCOUNTER — TRANSCRIBE ORDERS (OUTPATIENT)
Dept: LAB | Facility: CLINIC | Age: 70
End: 2020-08-28

## 2020-08-28 ENCOUNTER — HOSPITAL ENCOUNTER (OUTPATIENT)
Dept: RADIOLOGY | Facility: HOSPITAL | Age: 70
Discharge: HOME | End: 2020-08-28
Attending: INTERNAL MEDICINE
Payer: MEDICARE

## 2020-08-28 ENCOUNTER — APPOINTMENT (OUTPATIENT)
Dept: LAB | Facility: CLINIC | Age: 70
End: 2020-08-28
Payer: MEDICARE

## 2020-08-28 DIAGNOSIS — I10 ESSENTIAL HYPERTENSION: ICD-10-CM

## 2020-08-28 DIAGNOSIS — K21.9 GASTROESOPHAGEAL REFLUX DISEASE WITHOUT ESOPHAGITIS: ICD-10-CM

## 2020-08-28 DIAGNOSIS — R13.19 ESOPHAGEAL DYSPHAGIA: ICD-10-CM

## 2020-08-28 DIAGNOSIS — E78.2 MIXED HYPERLIPIDEMIA: ICD-10-CM

## 2020-08-28 DIAGNOSIS — I10 HYPERTENSION, UNSPECIFIED TYPE: ICD-10-CM

## 2020-08-28 DIAGNOSIS — R60.0 LOWER EXTREMITY EDEMA: ICD-10-CM

## 2020-08-28 DIAGNOSIS — Z11.59 NEED FOR HEPATITIS C SCREENING TEST: ICD-10-CM

## 2020-08-28 LAB
ALBUMIN SERPL BCP-MCNC: 3.8 G/DL (ref 3.5–5)
ALP SERPL-CCNC: 77 U/L (ref 46–116)
ALT SERPL W P-5'-P-CCNC: 23 U/L (ref 12–78)
ANION GAP SERPL CALCULATED.3IONS-SCNC: 10 MMOL/L (ref 4–13)
AST SERPL W P-5'-P-CCNC: 19 U/L (ref 5–45)
BASOPHILS # BLD AUTO: 0.05 THOUSANDS/ΜL (ref 0–0.1)
BASOPHILS NFR BLD AUTO: 1 % (ref 0–1)
BILIRUB SERPL-MCNC: 0.44 MG/DL (ref 0.2–1)
BUN SERPL-MCNC: 31 MG/DL (ref 5–25)
CALCIUM SERPL-MCNC: 9.5 MG/DL (ref 8.3–10.1)
CHLORIDE SERPL-SCNC: 105 MMOL/L (ref 100–108)
CHOLEST SERPL-MCNC: 184 MG/DL (ref 50–200)
CO2 SERPL-SCNC: 25 MMOL/L (ref 21–32)
CREAT SERPL-MCNC: 1.38 MG/DL (ref 0.6–1.3)
EOSINOPHIL # BLD AUTO: 0.13 THOUSAND/ΜL (ref 0–0.61)
EOSINOPHIL NFR BLD AUTO: 2 % (ref 0–6)
ERYTHROCYTE [DISTWIDTH] IN BLOOD BY AUTOMATED COUNT: 12.8 % (ref 11.6–15.1)
GFR SERPL CREATININE-BSD FRML MDRD: 38 ML/MIN/1.73SQ M
GLUCOSE P FAST SERPL-MCNC: 114 MG/DL (ref 65–99)
HCT VFR BLD AUTO: 39.6 % (ref 34.8–46.1)
HCV AB SER QL: NORMAL
HDLC SERPL-MCNC: 62 MG/DL
HGB BLD-MCNC: 12.7 G/DL (ref 11.5–15.4)
IMM GRANULOCYTES # BLD AUTO: 0.02 THOUSAND/UL (ref 0–0.2)
IMM GRANULOCYTES NFR BLD AUTO: 0 % (ref 0–2)
LDLC SERPL CALC-MCNC: 108 MG/DL (ref 0–100)
LYMPHOCYTES # BLD AUTO: 1.92 THOUSANDS/ΜL (ref 0.6–4.47)
LYMPHOCYTES NFR BLD AUTO: 27 % (ref 14–44)
MCH RBC QN AUTO: 28.3 PG (ref 26.8–34.3)
MCHC RBC AUTO-ENTMCNC: 32.1 G/DL (ref 31.4–37.4)
MCV RBC AUTO: 88 FL (ref 82–98)
MONOCYTES # BLD AUTO: 0.59 THOUSAND/ΜL (ref 0.17–1.22)
MONOCYTES NFR BLD AUTO: 8 % (ref 4–12)
NEUTROPHILS # BLD AUTO: 4.5 THOUSANDS/ΜL (ref 1.85–7.62)
NEUTS SEG NFR BLD AUTO: 62 % (ref 43–75)
NRBC BLD AUTO-RTO: 0 /100 WBCS
PLATELET # BLD AUTO: 307 THOUSANDS/UL (ref 149–390)
PMV BLD AUTO: 9.1 FL (ref 8.9–12.7)
POTASSIUM SERPL-SCNC: 4.9 MMOL/L (ref 3.5–5.3)
PROT SERPL-MCNC: 7.8 G/DL (ref 6.4–8.2)
RBC # BLD AUTO: 4.48 MILLION/UL (ref 3.81–5.12)
SODIUM SERPL-SCNC: 140 MMOL/L (ref 136–145)
TRIGL SERPL-MCNC: 69 MG/DL
WBC # BLD AUTO: 7.21 THOUSAND/UL (ref 4.31–10.16)

## 2020-08-28 PROCEDURE — 80061 LIPID PANEL: CPT

## 2020-08-28 PROCEDURE — 74240 X-RAY XM UPR GI TRC 1CNTRST: CPT

## 2020-08-28 PROCEDURE — 86803 HEPATITIS C AB TEST: CPT

## 2020-08-28 PROCEDURE — 36415 COLL VENOUS BLD VENIPUNCTURE: CPT

## 2020-08-28 PROCEDURE — 85025 COMPLETE CBC W/AUTO DIFF WBC: CPT

## 2020-08-28 PROCEDURE — 80053 COMPREHEN METABOLIC PANEL: CPT

## 2020-08-28 RX ORDER — LISINOPRIL AND HYDROCHLOROTHIAZIDE 20; 12.5 MG/1; MG/1
TABLET ORAL
Qty: 90 TABLET | Refills: 2 | OUTPATIENT
Start: 2020-08-28

## 2020-08-28 RX ORDER — LISINOPRIL 20 MG/1
TABLET ORAL
Qty: 90 TABLET | Refills: 2 | OUTPATIENT
Start: 2020-08-28

## 2020-08-29 DIAGNOSIS — I10 HYPERTENSION, UNSPECIFIED TYPE: ICD-10-CM

## 2020-08-31 ENCOUNTER — TELEPHONE (OUTPATIENT)
Dept: INTERNAL MEDICINE CLINIC | Facility: CLINIC | Age: 70
End: 2020-08-31

## 2020-08-31 ENCOUNTER — TELEPHONE (OUTPATIENT)
Dept: OTHER | Facility: OTHER | Age: 70
End: 2020-08-31

## 2020-08-31 ENCOUNTER — HOSPITAL ENCOUNTER (OUTPATIENT)
Dept: CT IMAGING | Facility: HOSPITAL | Age: 70
Discharge: HOME | End: 2020-08-31
Payer: MEDICARE

## 2020-08-31 ENCOUNTER — OFFICE VISIT (OUTPATIENT)
Dept: CARDIOLOGY CLINIC | Facility: CLINIC | Age: 70
End: 2020-08-31
Payer: MEDICARE

## 2020-08-31 ENCOUNTER — APPOINTMENT (OUTPATIENT)
Dept: LAB | Facility: HOSPITAL | Age: 70
End: 2020-08-31
Attending: INTERNAL MEDICINE
Payer: MEDICARE

## 2020-08-31 VITALS
WEIGHT: 166 LBS | SYSTOLIC BLOOD PRESSURE: 122 MMHG | BODY MASS INDEX: 31.34 KG/M2 | DIASTOLIC BLOOD PRESSURE: 70 MMHG | HEIGHT: 61 IN | HEART RATE: 70 BPM | TEMPERATURE: 97.9 F

## 2020-08-31 DIAGNOSIS — E78.2 MIXED HYPERLIPIDEMIA: ICD-10-CM

## 2020-08-31 DIAGNOSIS — I10 HYPERTENSION, UNSPECIFIED TYPE: ICD-10-CM

## 2020-08-31 DIAGNOSIS — R06.02 SOB (SHORTNESS OF BREATH): ICD-10-CM

## 2020-08-31 DIAGNOSIS — M79.605 PAIN IN BOTH LOWER EXTREMITIES: Primary | ICD-10-CM

## 2020-08-31 DIAGNOSIS — R06.02 SOB (SHORTNESS OF BREATH): Primary | ICD-10-CM

## 2020-08-31 DIAGNOSIS — M79.604 PAIN IN BOTH LOWER EXTREMITIES: Primary | ICD-10-CM

## 2020-08-31 DIAGNOSIS — I47.1 SVT (SUPRAVENTRICULAR TACHYCARDIA) (HCC): Primary | ICD-10-CM

## 2020-08-31 DIAGNOSIS — I10 ESSENTIAL HYPERTENSION: ICD-10-CM

## 2020-08-31 LAB — D DIMER PPP FEU-MCNC: 0.74 UG/ML FEU

## 2020-08-31 PROCEDURE — 1170F FXNL STATUS ASSESSED: CPT | Performed by: INTERNAL MEDICINE

## 2020-08-31 PROCEDURE — G1004 CDSM NDSC: HCPCS

## 2020-08-31 PROCEDURE — 1160F RVW MEDS BY RX/DR IN RCRD: CPT | Performed by: INTERNAL MEDICINE

## 2020-08-31 PROCEDURE — 3074F SYST BP LT 130 MM HG: CPT | Performed by: INTERNAL MEDICINE

## 2020-08-31 PROCEDURE — 3008F BODY MASS INDEX DOCD: CPT | Performed by: INTERNAL MEDICINE

## 2020-08-31 PROCEDURE — 99214 OFFICE O/P EST MOD 30 MIN: CPT | Performed by: INTERNAL MEDICINE

## 2020-08-31 PROCEDURE — 71275 CT ANGIOGRAPHY CHEST: CPT

## 2020-08-31 PROCEDURE — 85379 FIBRIN DEGRADATION QUANT: CPT

## 2020-08-31 PROCEDURE — 3078F DIAST BP <80 MM HG: CPT | Performed by: INTERNAL MEDICINE

## 2020-08-31 PROCEDURE — 36415 COLL VENOUS BLD VENIPUNCTURE: CPT

## 2020-08-31 PROCEDURE — 1036F TOBACCO NON-USER: CPT | Performed by: INTERNAL MEDICINE

## 2020-08-31 RX ORDER — LISINOPRIL AND HYDROCHLOROTHIAZIDE 20; 12.5 MG/1; MG/1
1 TABLET ORAL DAILY
Qty: 90 TABLET | Refills: 3 | Status: SHIPPED | OUTPATIENT
Start: 2020-08-31 | End: 2020-08-31 | Stop reason: ALTCHOICE

## 2020-08-31 RX ORDER — LISINOPRIL 20 MG/1
20 TABLET ORAL EVERY 24 HOURS
Qty: 90 TABLET | Refills: 3 | Status: SHIPPED | OUTPATIENT
Start: 2020-08-31 | End: 2020-08-31 | Stop reason: ALTCHOICE

## 2020-08-31 RX ORDER — LISINOPRIL 40 MG/1
40 TABLET ORAL DAILY
Qty: 30 TABLET | Refills: 5 | Status: SHIPPED | OUTPATIENT
Start: 2020-08-31 | End: 2021-03-03

## 2020-08-31 RX ADMIN — IODIXANOL 85 ML: 320 INJECTION, SOLUTION INTRAVASCULAR at 14:06

## 2020-08-31 NOTE — TELEPHONE ENCOUNTER
I called the patient and reviewed the lab results from 8/31/2020 and the fluroscopy UGI  She reports that the cardiologist stopped the HCTZ due to the elevated kidney function test   She will call cardiology for the Ct of the chest results    This was done for complaints of dyspnea and elevated D-Dimer test
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
## 2020-08-31 NOTE — TELEPHONE ENCOUNTER
Katiuska Barber from [address] [place] [address] room is calling in to report stat findings on the patient  Informed caller to call back if they do not receive a call back from a provider within 20-30 minutes

## 2020-08-31 NOTE — PROGRESS NOTES
Patient's D-dimer test came back abnormal   Will obtain a CT of the chest with contrast, PE study to rule out pulmonary embolism  Discussed with patient and she is agreeable

## 2020-09-02 ENCOUNTER — HOSPITAL ENCOUNTER (OUTPATIENT)
Dept: NON INVASIVE DIAGNOSTICS | Facility: HOSPITAL | Age: 70
Discharge: HOME | End: 2020-09-02
Attending: INTERNAL MEDICINE
Payer: MEDICARE

## 2020-09-02 DIAGNOSIS — E78.2 MIXED HYPERLIPIDEMIA: Primary | ICD-10-CM

## 2020-09-02 DIAGNOSIS — M79.604 PAIN IN BOTH LOWER EXTREMITIES: ICD-10-CM

## 2020-09-02 DIAGNOSIS — M79.605 PAIN IN BOTH LOWER EXTREMITIES: ICD-10-CM

## 2020-09-02 PROCEDURE — 93970 EXTREMITY STUDY: CPT | Performed by: SURGERY

## 2020-09-02 PROCEDURE — 93970 EXTREMITY STUDY: CPT

## 2020-09-02 RX ORDER — ATORVASTATIN CALCIUM 10 MG/1
10 TABLET, FILM COATED ORAL DAILY
Qty: 30 TABLET | Refills: 5 | OUTPATIENT
Start: 2020-09-02

## 2020-09-04 ENCOUNTER — TELEPHONE (OUTPATIENT)
Dept: CARDIOLOGY CLINIC | Facility: CLINIC | Age: 70
End: 2020-09-04

## 2020-09-04 NOTE — TELEPHONE ENCOUNTER
Called LM her vascular study on veins of legs were normal no clots  Called per Dr Eligio Rosales who reviewed results

## 2020-09-12 DIAGNOSIS — E78.00 PURE HYPERCHOLESTEROLEMIA: Primary | ICD-10-CM

## 2020-09-12 RX ORDER — ATORVASTATIN CALCIUM 10 MG/1
10 TABLET, FILM COATED ORAL DAILY
Qty: 90 TABLET | Refills: 1 | Status: SHIPPED | OUTPATIENT
Start: 2020-09-12 | End: 2020-09-29 | Stop reason: ALTCHOICE

## 2020-09-15 ENCOUNTER — TELEPHONE (OUTPATIENT)
Dept: INTERNAL MEDICINE CLINIC | Facility: CLINIC | Age: 70
End: 2020-09-15

## 2020-09-15 NOTE — TELEPHONE ENCOUNTER
Patient returned call regarding Lipitor medication prescribed  Stated that she wished to try diet and exercise first before starting medication

## 2020-09-23 ENCOUNTER — APPOINTMENT (OUTPATIENT)
Dept: LAB | Facility: CLINIC | Age: 70
End: 2020-09-23
Payer: MEDICARE

## 2020-09-23 DIAGNOSIS — I10 ESSENTIAL HYPERTENSION: ICD-10-CM

## 2020-09-23 LAB
ANION GAP SERPL CALCULATED.3IONS-SCNC: 12 MMOL/L (ref 4–13)
BUN SERPL-MCNC: 26 MG/DL (ref 5–25)
CALCIUM SERPL-MCNC: 8.6 MG/DL (ref 8.3–10.1)
CHLORIDE SERPL-SCNC: 106 MMOL/L (ref 100–108)
CO2 SERPL-SCNC: 24 MMOL/L (ref 21–32)
CREAT SERPL-MCNC: 1.08 MG/DL (ref 0.6–1.3)
GFR SERPL CREATININE-BSD FRML MDRD: 51 ML/MIN/1.73SQ M
GLUCOSE SERPL-MCNC: 86 MG/DL (ref 65–140)
POTASSIUM SERPL-SCNC: 4.9 MMOL/L (ref 3.5–5.3)
SODIUM SERPL-SCNC: 142 MMOL/L (ref 136–145)

## 2020-09-23 PROCEDURE — 36415 COLL VENOUS BLD VENIPUNCTURE: CPT

## 2020-09-23 PROCEDURE — 80048 BASIC METABOLIC PNL TOTAL CA: CPT

## 2020-09-24 ENCOUNTER — OFFICE VISIT (OUTPATIENT)
Dept: INTERNAL MEDICINE CLINIC | Facility: CLINIC | Age: 70
End: 2020-09-24
Payer: MEDICARE

## 2020-09-24 ENCOUNTER — TELEPHONE (OUTPATIENT)
Dept: INTERNAL MEDICINE CLINIC | Facility: CLINIC | Age: 70
End: 2020-09-24

## 2020-09-24 VITALS
WEIGHT: 165 LBS | TEMPERATURE: 97.1 F | DIASTOLIC BLOOD PRESSURE: 74 MMHG | HEIGHT: 62 IN | SYSTOLIC BLOOD PRESSURE: 140 MMHG | OXYGEN SATURATION: 99 % | BODY MASS INDEX: 30.36 KG/M2 | HEART RATE: 67 BPM

## 2020-09-24 DIAGNOSIS — K21.9 GASTROESOPHAGEAL REFLUX DISEASE WITHOUT ESOPHAGITIS: Primary | ICD-10-CM

## 2020-09-24 DIAGNOSIS — E78.00 PURE HYPERCHOLESTEROLEMIA: ICD-10-CM

## 2020-09-24 DIAGNOSIS — I10 ESSENTIAL HYPERTENSION: ICD-10-CM

## 2020-09-24 PROCEDURE — 99213 OFFICE O/P EST LOW 20 MIN: CPT | Performed by: INTERNAL MEDICINE

## 2020-09-24 RX ORDER — FAMOTIDINE 20 MG/1
20 TABLET, FILM COATED ORAL DAILY
Qty: 30 TABLET | Refills: 5 | Status: SHIPPED | OUTPATIENT
Start: 2020-09-24 | End: 2021-01-18 | Stop reason: ALTCHOICE

## 2020-09-24 NOTE — TELEPHONE ENCOUNTER
Pt will call back to set up a 6 months follow (around march) up with Dr Rodrigo Salcedo  she needs to get her schedule from work
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
## 2020-09-25 NOTE — ASSESSMENT & PLAN NOTE
Borderline blood pressure control  We will continue to monitor    Advised sodium restriction in diet

## 2020-09-29 ENCOUNTER — OFFICE VISIT (OUTPATIENT)
Dept: CARDIOLOGY CLINIC | Facility: CLINIC | Age: 70
End: 2020-09-29
Payer: MEDICARE

## 2020-09-29 VITALS
DIASTOLIC BLOOD PRESSURE: 68 MMHG | TEMPERATURE: 98.6 F | HEIGHT: 61 IN | HEART RATE: 80 BPM | WEIGHT: 167 LBS | SYSTOLIC BLOOD PRESSURE: 132 MMHG | BODY MASS INDEX: 31.53 KG/M2 | RESPIRATION RATE: 16 BRPM

## 2020-09-29 DIAGNOSIS — I10 ESSENTIAL HYPERTENSION: Primary | ICD-10-CM

## 2020-09-29 DIAGNOSIS — R06.02 SHORTNESS OF BREATH: ICD-10-CM

## 2020-09-29 DIAGNOSIS — I47.1 SVT (SUPRAVENTRICULAR TACHYCARDIA) (HCC): ICD-10-CM

## 2020-09-29 DIAGNOSIS — R60.0 LOWER EXTREMITY EDEMA: ICD-10-CM

## 2020-09-29 DIAGNOSIS — E78.00 PURE HYPERCHOLESTEROLEMIA: ICD-10-CM

## 2020-09-29 PROCEDURE — 99214 OFFICE O/P EST MOD 30 MIN: CPT | Performed by: INTERNAL MEDICINE
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
## 2020-10-14 ENCOUNTER — HOSPITAL ENCOUNTER (OUTPATIENT)
Dept: MAMMOGRAPHY | Facility: CLINIC | Age: 70
Discharge: HOME | End: 2020-10-14
Payer: MEDICARE

## 2020-10-14 DIAGNOSIS — Z12.31 ENCOUNTER FOR SCREENING MAMMOGRAM FOR MALIGNANT NEOPLASM OF BREAST: ICD-10-CM

## 2020-10-14 PROCEDURE — 77063 BREAST TOMOSYNTHESIS BI: CPT

## 2020-10-14 PROCEDURE — 77067 SCR MAMMO BI INCL CAD: CPT

## 2020-10-22 ENCOUNTER — TELEPHONE (OUTPATIENT)
Dept: CARDIOLOGY CLINIC | Facility: CLINIC | Age: 70
End: 2020-10-22

## 2020-10-22 DIAGNOSIS — I10 ESSENTIAL HYPERTENSION: Primary | ICD-10-CM

## 2020-10-22 RX ORDER — CARVEDILOL 6.25 MG/1
6.25 TABLET ORAL 2 TIMES DAILY WITH MEALS
Qty: 60 TABLET | Refills: 5 | Status: SHIPPED | OUTPATIENT
Start: 2020-10-22 | End: 2021-01-13 | Stop reason: DRUGHIGH

## 2021-01-04 ENCOUNTER — TELEPHONE (OUTPATIENT)
Dept: CARDIOLOGY CLINIC | Facility: CLINIC | Age: 71
End: 2021-01-04

## 2021-01-04 NOTE — TELEPHONE ENCOUNTER
Pt called "having weird pain in chest" comes and goes usually daily   she feels may be due  to reflux did make OV with GI   will make OV to see Dr Taryn Breen to [facility] and will go to ED if occurs and does not subside  Pt states has been occurring for over 2 weeks and is usually a dull pain which can occur anytime

## 2021-01-13 ENCOUNTER — OFFICE VISIT (OUTPATIENT)
Dept: CARDIOLOGY CLINIC | Facility: CLINIC | Age: 71
End: 2021-01-13
Payer: MEDICARE

## 2021-01-13 VITALS
DIASTOLIC BLOOD PRESSURE: 66 MMHG | WEIGHT: 170.8 LBS | SYSTOLIC BLOOD PRESSURE: 160 MMHG | HEART RATE: 59 BPM | TEMPERATURE: 97.8 F | BODY MASS INDEX: 32.27 KG/M2

## 2021-01-13 DIAGNOSIS — I10 ESSENTIAL HYPERTENSION: Primary | ICD-10-CM

## 2021-01-13 DIAGNOSIS — E66.09 CLASS 1 OBESITY DUE TO EXCESS CALORIES WITHOUT SERIOUS COMORBIDITY WITH BODY MASS INDEX (BMI) OF 31.0 TO 31.9 IN ADULT: ICD-10-CM

## 2021-01-13 DIAGNOSIS — I47.1 SVT (SUPRAVENTRICULAR TACHYCARDIA) (HCC): ICD-10-CM

## 2021-01-13 DIAGNOSIS — R06.02 SHORTNESS OF BREATH: ICD-10-CM

## 2021-01-13 DIAGNOSIS — R00.2 PALPITATION: ICD-10-CM

## 2021-01-13 DIAGNOSIS — E78.00 PURE HYPERCHOLESTEROLEMIA: ICD-10-CM

## 2021-01-13 DIAGNOSIS — R60.0 LOWER EXTREMITY EDEMA: ICD-10-CM

## 2021-01-13 PROCEDURE — 99214 OFFICE O/P EST MOD 30 MIN: CPT | Performed by: INTERNAL MEDICINE

## 2021-01-13 PROCEDURE — 93000 ELECTROCARDIOGRAM COMPLETE: CPT | Performed by: INTERNAL MEDICINE

## 2021-01-13 RX ORDER — CARVEDILOL 12.5 MG/1
12.5 TABLET ORAL 2 TIMES DAILY WITH MEALS
Qty: 60 TABLET | Refills: 5 | Status: SHIPPED | OUTPATIENT
Start: 2021-01-13 | End: 2021-02-10 | Stop reason: CLARIF
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
## 2021-01-18 ENCOUNTER — TELEPHONE (OUTPATIENT)
Dept: INTERNAL MEDICINE CLINIC | Facility: CLINIC | Age: 71
End: 2021-01-18

## 2021-01-18 ENCOUNTER — OFFICE VISIT (OUTPATIENT)
Dept: INTERNAL MEDICINE CLINIC | Facility: CLINIC | Age: 71
End: 2021-01-18
Payer: MEDICARE

## 2021-01-18 VITALS
BODY MASS INDEX: 32.21 KG/M2 | SYSTOLIC BLOOD PRESSURE: 144 MMHG | DIASTOLIC BLOOD PRESSURE: 80 MMHG | WEIGHT: 170.6 LBS | TEMPERATURE: 97.4 F | OXYGEN SATURATION: 99 % | HEART RATE: 68 BPM | HEIGHT: 61 IN

## 2021-01-18 DIAGNOSIS — I10 ESSENTIAL HYPERTENSION: ICD-10-CM

## 2021-01-18 DIAGNOSIS — K21.9 GASTROESOPHAGEAL REFLUX DISEASE WITHOUT ESOPHAGITIS: ICD-10-CM

## 2021-01-18 DIAGNOSIS — M15.9 PRIMARY OSTEOARTHRITIS INVOLVING MULTIPLE JOINTS: Primary | ICD-10-CM

## 2021-01-18 DIAGNOSIS — R13.19 ESOPHAGEAL DYSPHAGIA: ICD-10-CM

## 2021-01-18 PROBLEM — M15.0 PRIMARY OSTEOARTHRITIS INVOLVING MULTIPLE JOINTS: Status: ACTIVE | Noted: 2021-01-18

## 2021-01-18 PROCEDURE — 99214 OFFICE O/P EST MOD 30 MIN: CPT | Performed by: INTERNAL MEDICINE

## 2021-01-18 RX ORDER — LANOLIN ALCOHOL/MO/W.PET/CERES
1 CREAM (GRAM) TOPICAL 3 TIMES DAILY
Qty: 150 TABLET | Refills: 1 | Status: SHIPPED | OUTPATIENT
Start: 2021-01-18 | End: 2021-03-16 | Stop reason: ALTCHOICE

## 2021-01-18 NOTE — TELEPHONE ENCOUNTER
Patient seen today by Dr Elsie Broderick  Patient needs 4 month follow up with Dr Naima Reddy  In May 2021  Patient requesting to call back to schedule when she has her work schedule
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
## 2021-01-19 NOTE — ASSESSMENT & PLAN NOTE
Upper GI series disclosed evidence of moderate gastroesophageal reflux without any luminal abnormalities

## 2021-01-19 NOTE — ASSESSMENT & PLAN NOTE
Blood pressure is borderline high normal   Patient had been tried previously on amlodipine which resulted in peripheral edema  Will try to research the dosages

## 2021-01-19 NOTE — ASSESSMENT & PLAN NOTE
Will schedule patient with a consultation with Gastroenterology for consideration of upper endoscopy to evaluate her dysphagia    Additional labs are scheduled

## 2021-01-26 ENCOUNTER — TELEPHONE (OUTPATIENT)
Dept: CARDIOLOGY CLINIC | Facility: CLINIC | Age: 71
End: 2021-01-26

## 2021-01-26 ENCOUNTER — DOCUMENTATION (OUTPATIENT)
Dept: CARDIOLOGY CLINIC | Facility: CLINIC | Age: 71
End: 2021-01-26

## 2021-01-26 NOTE — TELEPHONE ENCOUNTER
Talked to Yasemin London today  Her blood pressures are actually running higher than what they usually are  Her pulses in the 60s  She is experiencing a dull headache off and a 1 during the day  I think her headache is more secondary to  Her hypertension rather than the recent increase in Coreg dose  I recommended that she further increase her Coreg to 25 mg twice a day by taking 2 - 12 5 mg with breakfast and 2 - 12 5 mg with supper  I asked her to call us next week and let us know how she is feeling on the increase in Coreg dose, how the headaches are, and how her blood pressure and pulse is running  If patient is going to stay on 25 mg Coreg b i d  she will need a new prescription  If we are going back to 12 5 mg b i d  we will have to see about getting her more medication  Please keep me posted on how she is doing

## 2021-01-26 NOTE — TELEPHONE ENCOUNTER
Pt called started increased dose of coreg 12 5 BID and is reporting her BP reading are 140-160/70-80 and has been experiencing a dull headache on and off daily please advise

## 2021-01-26 NOTE — PROGRESS NOTES
Talked to Vandana Levi today  Her blood pressures are actually running higher than what they usually are  Her pulses in the 60s  She is experiencing a dull headache off and a 1 during the day  I think her headache is more secondary to  Her hypertension rather than the recent increase in Coreg dose  I recommended that she further increase her Coreg to 25 mg twice a day by taking 2 - 12 5 mg with breakfast and 2 - 12 5 mg with supper  I asked her to call us next week and let us know how she is feeling on the increase in Coreg dose, how the headaches are, and how her blood pressure and pulse is running  If patient is going to stay on 25 mg Coreg b i d  she will need a new prescription  If we are going back to 12 5 mg b i d  we will have to see about getting her more medication  Please keep me posted on how she is doing

## 2021-02-09 ENCOUNTER — TELEPHONE (OUTPATIENT)
Dept: CARDIOLOGY CLINIC | Facility: CLINIC | Age: 71
End: 2021-02-09

## 2021-02-09 NOTE — TELEPHONE ENCOUNTER
Pt calling with update on BP  Pt states new change to meds are still not working  Pt's BP now running higher, between 150-170's/90's  This morning BP was 170/95  Pt states this is not an eye problem as she has seen the eye doctor  Pt gets occular migraines and was told she should talk to her doctors about her BP  Please advise

## 2021-02-09 NOTE — TELEPHONE ENCOUNTER
Pt states she was on lisinopril with HCTZ about 3-4 months ago and was taken off by Dr Jules Burleson due to kidney function  Please advise

## 2021-02-10 ENCOUNTER — TELEPHONE (OUTPATIENT)
Dept: CARDIOLOGY CLINIC | Facility: CLINIC | Age: 71
End: 2021-02-10

## 2021-02-10 DIAGNOSIS — I10 ESSENTIAL HYPERTENSION: Primary | ICD-10-CM

## 2021-02-10 RX ORDER — LABETALOL 200 MG/1
200 TABLET, FILM COATED ORAL 2 TIMES DAILY
Qty: 60 TABLET | Refills: 5 | Status: SHIPPED | OUTPATIENT
Start: 2021-02-10 | End: 2021-03-16

## 2021-02-10 NOTE — TELEPHONE ENCOUNTER
I discussed with patient today  By outgoing telephone to discontinue her carvedilol and  a prescription at her pharmacy of labetalol 200 mg 2 times a day  She should substitute labetalol for her previous carvedilol  She is agreeable and understands  Prescription sent to her pharmacy

## 2021-02-10 NOTE — TELEPHONE ENCOUNTER
I had a vacation day yesterday for personal matter  I was unavailable most of the day  I did send a note requesting whether the patient has had taken HCTZ in the past since it was not listed as an allergy  I tried to call the patient's this morning and received no answer  Left a message on the machine

## 2021-02-10 NOTE — TELEPHONE ENCOUNTER
Patient called back and is okay with her  starting the HCTZ    "but Dr Andrew Mayer took her off of this medication in the past because it was comprimising her kidneys, and won't it do that again?"  Patient being very difficult about Dr Rizo Her calls because she wants to talk to him directly and SHE is not available during the day, due to working  Wants to know about the Hct comprimising her kidney function now

## 2021-02-10 NOTE — TELEPHONE ENCOUNTER
Pt called upset did not receive respose to her message  Forwarded messsage to Dr Triny Guajardo of same  [place] texted

## 2021-02-17 ENCOUNTER — TELEPHONE (OUTPATIENT)
Dept: CARDIOLOGY CLINIC | Facility: CLINIC | Age: 71
End: 2021-02-17

## 2021-02-17 NOTE — TELEPHONE ENCOUNTER
Pt called to report her BP is jumping all over, 130's 140's 150's  She also states she have mentioned to Dr Maxine Rodas that at times she loses her visionfor short period, this has been occurring more frequently, she did see her eye doctor who stated is not an eye issue suggests she speak to her MD so she turning to you  (you had originally told her to make sure not an eye issue)  Suggested possible vascular studies Please advise

## 2021-02-21 DIAGNOSIS — G45.3 AMAUROSIS FUGAX: Primary | ICD-10-CM

## 2021-02-21 NOTE — TELEPHONE ENCOUNTER
Symptoms sound like amaurosis fugax  I have ordered a complete carotid ultrasound  Please call the patient and have it scheduled  Also asked the patient to take her blood pressure sitting and then immediately following standing and report the results to you  She probably should do it several times

## 2021-02-21 NOTE — PROGRESS NOTES
Pt called to report her BP is jumping all over, 130's 140's 150's  She also states she have mentioned to Dr Leta Jackson that at times she loses her visionfor short period, this has been occurring more frequently, she did see her eye doctor who stated is not an eye issue suggests she speak to her MD so she turning to you  (you had originally told her to make sure not an eye issue)  Suggested possible vascular studies Please advise  symptoms sound like amaurosis fugax    Will obtain a carotid ultrasound

## 2021-02-22 NOTE — TELEPHONE ENCOUNTER
Pt called LM as per Dr Michael Malin to schedule carotid US and to do orthostatic BP (several) and call results to office  Phone number left for central scheduling

## 2021-02-24 ENCOUNTER — HOSPITAL ENCOUNTER (OUTPATIENT)
Dept: VASCULAR ULTRASOUND | Facility: HOSPITAL | Age: 71
Discharge: HOME | End: 2021-02-24
Payer: MEDICARE

## 2021-02-24 DIAGNOSIS — G45.3 AMAUROSIS FUGAX: ICD-10-CM

## 2021-02-24 PROCEDURE — 93880 EXTRACRANIAL BILAT STUDY: CPT

## 2021-02-25 ENCOUNTER — APPOINTMENT (OUTPATIENT)
Dept: LAB | Facility: CLINIC | Age: 71
End: 2021-02-25
Payer: MEDICARE

## 2021-02-25 ENCOUNTER — TRANSCRIBE ORDERS (OUTPATIENT)
Dept: FAMILY MEDICINE CLINIC | Facility: HOSPITAL | Age: 71
End: 2021-02-25

## 2021-02-25 DIAGNOSIS — M15.9 PRIMARY OSTEOARTHRITIS INVOLVING MULTIPLE JOINTS: ICD-10-CM

## 2021-02-25 DIAGNOSIS — E78.00 PURE HYPERCHOLESTEROLEMIA: ICD-10-CM

## 2021-02-25 DIAGNOSIS — K21.9 GASTROESOPHAGEAL REFLUX DISEASE WITHOUT ESOPHAGITIS: ICD-10-CM

## 2021-02-25 DIAGNOSIS — I10 ESSENTIAL HYPERTENSION: ICD-10-CM

## 2021-02-25 LAB
ALBUMIN SERPL BCP-MCNC: 3.6 G/DL (ref 3.5–5)
ALP SERPL-CCNC: 67 U/L (ref 46–116)
ALT SERPL W P-5'-P-CCNC: 18 U/L (ref 12–78)
ANION GAP SERPL CALCULATED.3IONS-SCNC: 8 MMOL/L (ref 4–13)
AST SERPL W P-5'-P-CCNC: 12 U/L (ref 5–45)
BILIRUB SERPL-MCNC: 0.59 MG/DL (ref 0.2–1)
BUN SERPL-MCNC: 23 MG/DL (ref 5–25)
CALCIUM SERPL-MCNC: 9.1 MG/DL (ref 8.3–10.1)
CHLORIDE SERPL-SCNC: 107 MMOL/L (ref 100–108)
CHOLEST SERPL-MCNC: 178 MG/DL (ref 50–200)
CO2 SERPL-SCNC: 25 MMOL/L (ref 21–32)
CREAT SERPL-MCNC: 1.08 MG/DL (ref 0.6–1.3)
CRP SERPL QL: 3.1 MG/L
ERYTHROCYTE [DISTWIDTH] IN BLOOD BY AUTOMATED COUNT: 13.2 % (ref 11.6–15.1)
GFR SERPL CREATININE-BSD FRML MDRD: 51 ML/MIN/1.73SQ M
GLUCOSE P FAST SERPL-MCNC: 96 MG/DL (ref 65–99)
HCT VFR BLD AUTO: 38.9 % (ref 34.8–46.1)
HDLC SERPL-MCNC: 65 MG/DL
HGB BLD-MCNC: 12.4 G/DL (ref 11.5–15.4)
LDLC SERPL CALC-MCNC: 102 MG/DL (ref 0–100)
MCH RBC QN AUTO: 27.9 PG (ref 26.8–34.3)
MCHC RBC AUTO-ENTMCNC: 31.9 G/DL (ref 31.4–37.4)
MCV RBC AUTO: 88 FL (ref 82–98)
PLATELET # BLD AUTO: 261 THOUSANDS/UL (ref 149–390)
PMV BLD AUTO: 9.5 FL (ref 8.9–12.7)
POTASSIUM SERPL-SCNC: 4.5 MMOL/L (ref 3.5–5.3)
PROT SERPL-MCNC: 7.2 G/DL (ref 6.4–8.2)
RBC # BLD AUTO: 4.44 MILLION/UL (ref 3.81–5.12)
SODIUM SERPL-SCNC: 140 MMOL/L (ref 136–145)
TRIGL SERPL-MCNC: 56 MG/DL
WBC # BLD AUTO: 5.61 THOUSAND/UL (ref 4.31–10.16)

## 2021-02-25 PROCEDURE — 93880 EXTRACRANIAL BILAT STUDY: CPT | Performed by: SURGERY

## 2021-02-25 PROCEDURE — 80053 COMPREHEN METABOLIC PANEL: CPT

## 2021-02-25 PROCEDURE — 86140 C-REACTIVE PROTEIN: CPT

## 2021-02-25 PROCEDURE — 36415 COLL VENOUS BLD VENIPUNCTURE: CPT

## 2021-02-25 PROCEDURE — 85027 COMPLETE CBC AUTOMATED: CPT

## 2021-02-25 PROCEDURE — 86617 LYME DISEASE ANTIBODY: CPT

## 2021-02-25 PROCEDURE — 80061 LIPID PANEL: CPT

## 2021-02-25 PROCEDURE — 86618 LYME DISEASE ANTIBODY: CPT

## 2021-02-26 LAB — B BURGDOR IGG+IGM SER-ACNC: 163

## 2021-02-27 LAB
B BURGDOR IGG PATRN SER IB-IMP: POSITIVE
B BURGDOR IGM PATRN SER IB-IMP: POSITIVE
B BURGDOR18KD IGG SER QL IB: PRESENT
B BURGDOR23KD IGG SER QL IB: ABNORMAL
B BURGDOR23KD IGM SER QL IB: PRESENT
B BURGDOR28KD IGG SER QL IB: ABNORMAL
B BURGDOR30KD IGG SER QL IB: ABNORMAL
B BURGDOR39KD IGG SER QL IB: PRESENT
B BURGDOR39KD IGM SER QL IB: PRESENT
B BURGDOR41KD IGG SER QL IB: PRESENT
B BURGDOR41KD IGM SER QL IB: ABNORMAL
B BURGDOR45KD IGG SER QL IB: ABNORMAL
B BURGDOR58KD IGG SER QL IB: PRESENT
B BURGDOR66KD IGG SER QL IB: ABNORMAL
B BURGDOR93KD IGG SER QL IB: PRESENT

## 2021-03-01 ENCOUNTER — TELEPHONE (OUTPATIENT)
Dept: INTERNAL MEDICINE CLINIC | Facility: CLINIC | Age: 71
End: 2021-03-01

## 2021-03-01 NOTE — TELEPHONE ENCOUNTER
Yanick Hardy called returning a phone call regarding lab work  She wasn't sure who had left the message  She only knew that it was from Dr Jasiel Soliz office  Lab work was from 02/25, she said it should be for her Lyme test  Please advise

## 2021-03-02 DIAGNOSIS — I10 ESSENTIAL HYPERTENSION: ICD-10-CM

## 2021-03-02 DIAGNOSIS — A69.20 LYME DISEASE: Primary | ICD-10-CM

## 2021-03-02 RX ORDER — DOXYCYCLINE HYCLATE 100 MG
100 TABLET ORAL 2 TIMES DAILY
Qty: 28 TABLET | Refills: 0 | Status: SHIPPED | OUTPATIENT
Start: 2021-03-02 | End: 2021-03-16

## 2021-03-03 RX ORDER — LISINOPRIL 40 MG/1
TABLET ORAL
Qty: 30 TABLET | Refills: 5 | Status: SHIPPED | OUTPATIENT
Start: 2021-03-03 | End: 2021-03-30 | Stop reason: ALTCHOICE

## 2021-03-13 ENCOUNTER — TELEPHONE (OUTPATIENT)
Dept: OTHER | Facility: OTHER | Age: 71
End: 2021-03-13

## 2021-03-13 ENCOUNTER — TELEPHONE (OUTPATIENT)
Dept: NON INVASIVE DIAGNOSTICS | Facility: HOSPITAL | Age: 71
End: 2021-03-13

## 2021-03-13 NOTE — TELEPHONE ENCOUNTER
Msg: "My BP has been high today, 174/87, and I have pain in my head   I would like to know if I can double up on my labetalol 200 mg medication "

## 2021-03-13 NOTE — TELEPHONE ENCOUNTER
As on-call provider received a phone call from the patient who indicates that for the last 2-3 days she has had a persistent headache  She reports her morning blood pressures typically exceed 180 with improvement after taking her morning dose of lisinopril and labetalol with SBP trend 140-160 during the latter part of the day  She reports pulse rate typically around 60 questioning whether she can increase her dose of labetalol and if it is acceptable to take anything for her headache  Patient was advised to take an extra 100 mg of labetalol (1/2 tablet) now  She was also told that it would be okay to take Aleve  She was instructed to continue to monitor her BP and heart rate trend and if her blood pressures remained above 100 and 60 and heart rate above 60 that she could also take an extra 100 mg tomorrow morning (total 300 mg)  She has a scheduled visit to see Dr Gala Shipley this week and he can address further recommendations at that time

## 2021-03-14 PROBLEM — G45.3 AMAUROSIS FUGAX: Status: ACTIVE | Noted: 2021-03-14

## 2021-03-16 ENCOUNTER — OFFICE VISIT (OUTPATIENT)
Dept: CARDIOLOGY CLINIC | Facility: CLINIC | Age: 71
End: 2021-03-16
Payer: MEDICARE

## 2021-03-16 VITALS
DIASTOLIC BLOOD PRESSURE: 74 MMHG | TEMPERATURE: 97.6 F | RESPIRATION RATE: 16 BRPM | BODY MASS INDEX: 32.17 KG/M2 | SYSTOLIC BLOOD PRESSURE: 142 MMHG | HEIGHT: 61 IN | WEIGHT: 170.4 LBS | HEART RATE: 64 BPM

## 2021-03-16 DIAGNOSIS — E78.00 PURE HYPERCHOLESTEROLEMIA: ICD-10-CM

## 2021-03-16 DIAGNOSIS — I47.1 SVT (SUPRAVENTRICULAR TACHYCARDIA) (HCC): ICD-10-CM

## 2021-03-16 DIAGNOSIS — G45.3 AMAUROSIS FUGAX: ICD-10-CM

## 2021-03-16 DIAGNOSIS — R00.2 PALPITATION: ICD-10-CM

## 2021-03-16 DIAGNOSIS — I10 ESSENTIAL HYPERTENSION: Primary | ICD-10-CM

## 2021-03-16 DIAGNOSIS — A69.20 LYME DISEASE: ICD-10-CM

## 2021-03-16 DIAGNOSIS — R06.02 SHORTNESS OF BREATH: ICD-10-CM

## 2021-03-16 PROCEDURE — 99215 OFFICE O/P EST HI 40 MIN: CPT | Performed by: INTERNAL MEDICINE

## 2021-03-16 PROCEDURE — 93000 ELECTROCARDIOGRAM COMPLETE: CPT | Performed by: INTERNAL MEDICINE

## 2021-03-16 RX ORDER — LABETALOL 200 MG/1
600 TABLET, FILM COATED ORAL 2 TIMES DAILY
Qty: 180 TABLET | Refills: 5 | Status: SHIPPED | OUTPATIENT
Start: 2021-03-16 | End: 2021-07-13 | Stop reason: ALTCHOICE
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
## 2021-03-25 ENCOUNTER — HOSPITAL ENCOUNTER (OUTPATIENT)
Facility: HOSPITAL | Age: 71
Setting detail: OBSERVATION
Discharge: HOME | End: 2021-03-26
Attending: EMERGENCY MEDICINE | Admitting: INTERNAL MEDICINE
Payer: MEDICARE

## 2021-03-25 ENCOUNTER — APPOINTMENT (EMERGENCY)
Dept: RADIOLOGY | Facility: HOSPITAL | Age: 71
End: 2021-03-25
Payer: MEDICARE

## 2021-03-25 ENCOUNTER — TELEPHONE (OUTPATIENT)
Dept: OTHER | Facility: OTHER | Age: 71
End: 2021-03-25

## 2021-03-25 DIAGNOSIS — I10 HTN (HYPERTENSION): Primary | ICD-10-CM

## 2021-03-25 DIAGNOSIS — I10 HYPERTENSION: ICD-10-CM

## 2021-03-25 DIAGNOSIS — R07.89 CHEST PRESSURE: Primary | ICD-10-CM

## 2021-03-25 DIAGNOSIS — I10 ESSENTIAL HYPERTENSION: ICD-10-CM

## 2021-03-25 LAB
ALBUMIN SERPL BCP-MCNC: 3.5 G/DL (ref 3.5–5)
ALP SERPL-CCNC: 87 U/L (ref 46–116)
ALT SERPL W P-5'-P-CCNC: 22 U/L (ref 12–78)
ANION GAP SERPL CALCULATED.3IONS-SCNC: 11 MMOL/L (ref 4–13)
APTT PPP: 30 SECONDS (ref 23–37)
AST SERPL W P-5'-P-CCNC: 17 U/L (ref 5–45)
BASOPHILS # BLD AUTO: 0.03 THOUSANDS/ΜL (ref 0–0.1)
BASOPHILS NFR BLD AUTO: 1 % (ref 0–1)
BILIRUB SERPL-MCNC: 0.25 MG/DL (ref 0.2–1)
BUN SERPL-MCNC: 21 MG/DL (ref 5–25)
CALCIUM SERPL-MCNC: 9.1 MG/DL (ref 8.3–10.1)
CHLORIDE SERPL-SCNC: 107 MMOL/L (ref 100–108)
CO2 SERPL-SCNC: 25 MMOL/L (ref 21–32)
CREAT SERPL-MCNC: 1.24 MG/DL (ref 0.6–1.3)
EOSINOPHIL # BLD AUTO: 0.24 THOUSAND/ΜL (ref 0–0.61)
EOSINOPHIL NFR BLD AUTO: 4 % (ref 0–6)
ERYTHROCYTE [DISTWIDTH] IN BLOOD BY AUTOMATED COUNT: 13.2 % (ref 11.6–15.1)
FLUAV RNA RESP QL NAA+PROBE: NEGATIVE
FLUBV RNA RESP QL NAA+PROBE: NEGATIVE
GFR SERPL CREATININE-BSD FRML MDRD: 43 ML/MIN/1.73SQ M
GLUCOSE SERPL-MCNC: 134 MG/DL (ref 65–140)
HCT VFR BLD AUTO: 34.6 % (ref 34.8–46.1)
HGB BLD-MCNC: 11.1 G/DL (ref 11.5–15.4)
IMM GRANULOCYTES # BLD AUTO: 0.04 THOUSAND/UL (ref 0–0.2)
IMM GRANULOCYTES NFR BLD AUTO: 1 % (ref 0–2)
INR PPP: 1.08 (ref 0.84–1.19)
LYMPHOCYTES # BLD AUTO: 1.81 THOUSANDS/ΜL (ref 0.6–4.47)
LYMPHOCYTES NFR BLD AUTO: 28 % (ref 14–44)
MCH RBC QN AUTO: 28.2 PG (ref 26.8–34.3)
MCHC RBC AUTO-ENTMCNC: 32.1 G/DL (ref 31.4–37.4)
MCV RBC AUTO: 88 FL (ref 82–98)
MONOCYTES # BLD AUTO: 0.66 THOUSAND/ΜL (ref 0.17–1.22)
MONOCYTES NFR BLD AUTO: 10 % (ref 4–12)
NEUTROPHILS # BLD AUTO: 3.66 THOUSANDS/ΜL (ref 1.85–7.62)
NEUTS SEG NFR BLD AUTO: 56 % (ref 43–75)
NRBC BLD AUTO-RTO: 0 /100 WBCS
NT-PROBNP SERPL-MCNC: 249 PG/ML
PLATELET # BLD AUTO: 238 THOUSANDS/UL (ref 149–390)
PMV BLD AUTO: 10 FL (ref 8.9–12.7)
POTASSIUM SERPL-SCNC: 4 MMOL/L (ref 3.5–5.3)
PROT SERPL-MCNC: 7.1 G/DL (ref 6.4–8.2)
PROTHROMBIN TIME: 14.1 SECONDS (ref 11.6–14.5)
RBC # BLD AUTO: 3.93 MILLION/UL (ref 3.81–5.12)
RSV RNA RESP QL NAA+PROBE: NEGATIVE
SARS-COV-2 RNA RESP QL NAA+PROBE: NEGATIVE
SODIUM SERPL-SCNC: 143 MMOL/L (ref 136–145)
TROPONIN I SERPL-MCNC: <0.02 NG/ML
WBC # BLD AUTO: 6.44 THOUSAND/UL (ref 4.31–10.16)

## 2021-03-25 PROCEDURE — 80053 COMPREHEN METABOLIC PANEL: CPT | Performed by: EMERGENCY MEDICINE

## 2021-03-25 PROCEDURE — 1124F ACP DISCUSS-NO DSCNMKR DOCD: CPT | Performed by: EMERGENCY MEDICINE

## 2021-03-25 PROCEDURE — 80061 LIPID PANEL: CPT | Performed by: NURSE PRACTITIONER

## 2021-03-25 PROCEDURE — 71045 X-RAY EXAM CHEST 1 VIEW: CPT

## 2021-03-25 PROCEDURE — 0241U HB NFCT DS VIR RESP RNA 4 TRGT: CPT | Performed by: EMERGENCY MEDICINE

## 2021-03-25 PROCEDURE — 84484 ASSAY OF TROPONIN QUANT: CPT | Performed by: EMERGENCY MEDICINE

## 2021-03-25 PROCEDURE — 99284 EMERGENCY DEPT VISIT MOD MDM: CPT

## 2021-03-25 PROCEDURE — 83036 HEMOGLOBIN GLYCOSYLATED A1C: CPT | Performed by: NURSE PRACTITIONER

## 2021-03-25 PROCEDURE — 93005 ELECTROCARDIOGRAM TRACING: CPT

## 2021-03-25 PROCEDURE — 85025 COMPLETE CBC W/AUTO DIFF WBC: CPT | Performed by: EMERGENCY MEDICINE

## 2021-03-25 PROCEDURE — 85610 PROTHROMBIN TIME: CPT | Performed by: EMERGENCY MEDICINE

## 2021-03-25 PROCEDURE — 85730 THROMBOPLASTIN TIME PARTIAL: CPT | Performed by: EMERGENCY MEDICINE

## 2021-03-25 PROCEDURE — 99285 EMERGENCY DEPT VISIT HI MDM: CPT | Performed by: EMERGENCY MEDICINE

## 2021-03-25 PROCEDURE — 83880 ASSAY OF NATRIURETIC PEPTIDE: CPT | Performed by: EMERGENCY MEDICINE

## 2021-03-25 PROCEDURE — 36415 COLL VENOUS BLD VENIPUNCTURE: CPT | Performed by: EMERGENCY MEDICINE

## 2021-03-25 RX ORDER — ASPIRIN 325 MG
325 TABLET ORAL ONCE
Status: COMPLETED | OUTPATIENT
Start: 2021-03-25 | End: 2021-03-26

## 2021-03-25 NOTE — LETTER
Shaila Loya [facility] [address]  Dept: [phone]    March 26, 2021     Patient: Anthony Ascencio   YOB: 1950   Date of Visit: 3/25/2021       To Whom it May Concern:    Jose Buitrago is under my professional care  She was seen in the hospital from 3/25/2021   to 03/26/21  She may return to work on Tuesday the 30th of March without limitations  If you have any questions or concerns, please don't hesitate to call           Sincerely,          Shy Cintron MD

## 2021-03-25 NOTE — TELEPHONE ENCOUNTER
[phone]/ Francesco/ PT  Morris Antoine 1950/ PT's  states PT started new med   Her current BP is 179/88, 52 pulse

## 2021-03-26 ENCOUNTER — TELEPHONE (OUTPATIENT)
Dept: CARDIOLOGY CLINIC | Facility: CLINIC | Age: 71
End: 2021-03-26

## 2021-03-26 ENCOUNTER — APPOINTMENT (OUTPATIENT)
Dept: CT IMAGING | Facility: HOSPITAL | Age: 71
End: 2021-03-26
Payer: MEDICARE

## 2021-03-26 ENCOUNTER — APPOINTMENT (OUTPATIENT)
Dept: NON INVASIVE DIAGNOSTICS | Facility: HOSPITAL | Age: 71
End: 2021-03-26
Payer: MEDICARE

## 2021-03-26 VITALS
RESPIRATION RATE: 18 BRPM | BODY MASS INDEX: 32.12 KG/M2 | TEMPERATURE: 98.2 F | WEIGHT: 170 LBS | SYSTOLIC BLOOD PRESSURE: 160 MMHG | HEART RATE: 63 BPM | OXYGEN SATURATION: 96 % | DIASTOLIC BLOOD PRESSURE: 72 MMHG

## 2021-03-26 PROBLEM — R07.89 CHEST PRESSURE: Status: ACTIVE | Noted: 2021-03-26

## 2021-03-26 PROBLEM — R07.9 CHEST PAIN: Status: ACTIVE | Noted: 2021-03-26

## 2021-03-26 LAB
ATRIAL RATE: 57 BPM
ATRIAL RATE: 60 BPM
CHOLEST SERPL-MCNC: 170 MG/DL (ref 50–200)
D DIMER PPP FEU-MCNC: 0.81 UG/ML FEU
EST. AVERAGE GLUCOSE BLD GHB EST-MCNC: 114 MG/DL
HBA1C MFR BLD: 5.6 %
HDLC SERPL-MCNC: 53 MG/DL
LDLC SERPL CALC-MCNC: 91 MG/DL (ref 0–100)
P AXIS: 66 DEGREES
P AXIS: 68 DEGREES
PR INTERVAL: 188 MS
PR INTERVAL: 188 MS
QRS AXIS: 69 DEGREES
QRS AXIS: 77 DEGREES
QRSD INTERVAL: 78 MS
QRSD INTERVAL: 88 MS
QT INTERVAL: 412 MS
QT INTERVAL: 426 MS
QTC INTERVAL: 412 MS
QTC INTERVAL: 414 MS
T WAVE AXIS: 48 DEGREES
T WAVE AXIS: 48 DEGREES
TRIGL SERPL-MCNC: 131 MG/DL
TROPONIN I SERPL-MCNC: <0.02 NG/ML
TROPONIN I SERPL-MCNC: <0.02 NG/ML
VENTRICULAR RATE: 57 BPM
VENTRICULAR RATE: 60 BPM

## 2021-03-26 PROCEDURE — 93010 ELECTROCARDIOGRAM REPORT: CPT | Performed by: INTERNAL MEDICINE

## 2021-03-26 PROCEDURE — 85379 FIBRIN DEGRADATION QUANT: CPT | Performed by: NURSE PRACTITIONER

## 2021-03-26 PROCEDURE — 93306 TTE W/DOPPLER COMPLETE: CPT | Performed by: INTERNAL MEDICINE

## 2021-03-26 PROCEDURE — 99220 PR INITIAL OBSERVATION CARE/DAY 70 MINUTES: CPT | Performed by: NURSE PRACTITIONER

## 2021-03-26 PROCEDURE — G1004 CDSM NDSC: HCPCS

## 2021-03-26 PROCEDURE — 84484 ASSAY OF TROPONIN QUANT: CPT | Performed by: NURSE PRACTITIONER

## 2021-03-26 PROCEDURE — 71275 CT ANGIOGRAPHY CHEST: CPT

## 2021-03-26 PROCEDURE — RECHECK: Performed by: INTERNAL MEDICINE

## 2021-03-26 PROCEDURE — 93306 TTE W/DOPPLER COMPLETE: CPT

## 2021-03-26 PROCEDURE — 36415 COLL VENOUS BLD VENIPUNCTURE: CPT | Performed by: NURSE PRACTITIONER

## 2021-03-26 PROCEDURE — 93005 ELECTROCARDIOGRAM TRACING: CPT

## 2021-03-26 RX ORDER — ACETAMINOPHEN 325 MG/1
650 TABLET ORAL EVERY 6 HOURS PRN
Status: DISCONTINUED | OUTPATIENT
Start: 2021-03-26 | End: 2021-03-26 | Stop reason: HOSPADM

## 2021-03-26 RX ORDER — CLONIDINE HYDROCHLORIDE 0.1 MG/1
0.1 TABLET ORAL EVERY 12 HOURS SCHEDULED
Qty: 60 TABLET | Refills: 0 | Status: SHIPPED | OUTPATIENT
Start: 2021-03-26 | End: 2021-03-26

## 2021-03-26 RX ORDER — HEPARIN SODIUM 5000 [USP'U]/ML
5000 INJECTION, SOLUTION INTRAVENOUS; SUBCUTANEOUS EVERY 8 HOURS SCHEDULED
Status: DISCONTINUED | OUTPATIENT
Start: 2021-03-26 | End: 2021-03-26 | Stop reason: HOSPADM

## 2021-03-26 RX ORDER — LABETALOL 200 MG/1
600 TABLET, FILM COATED ORAL 2 TIMES DAILY
Status: DISCONTINUED | OUTPATIENT
Start: 2021-03-26 | End: 2021-03-26 | Stop reason: HOSPADM

## 2021-03-26 RX ORDER — SODIUM CHLORIDE 9 MG/ML
50 INJECTION, SOLUTION INTRAVENOUS CONTINUOUS
Status: DISCONTINUED | OUTPATIENT
Start: 2021-03-26 | End: 2021-03-26 | Stop reason: HOSPADM

## 2021-03-26 RX ORDER — LISINOPRIL 20 MG/1
40 TABLET ORAL DAILY
Status: DISCONTINUED | OUTPATIENT
Start: 2021-03-26 | End: 2021-03-26 | Stop reason: HOSPADM

## 2021-03-26 RX ORDER — CLONIDINE HYDROCHLORIDE 0.1 MG/1
0.1 TABLET ORAL EVERY 12 HOURS SCHEDULED
Status: DISCONTINUED | OUTPATIENT
Start: 2021-03-26 | End: 2021-03-26 | Stop reason: HOSPADM

## 2021-03-26 RX ORDER — CLONIDINE HYDROCHLORIDE 0.1 MG/1
0.1 TABLET ORAL EVERY 12 HOURS SCHEDULED
Qty: 60 TABLET | Refills: 0 | Status: SHIPPED | OUTPATIENT
Start: 2021-03-26 | End: 2021-03-30

## 2021-03-26 RX ORDER — PANTOPRAZOLE SODIUM 40 MG/1
40 TABLET, DELAYED RELEASE ORAL
Status: DISCONTINUED | OUTPATIENT
Start: 2021-03-26 | End: 2021-03-26 | Stop reason: HOSPADM

## 2021-03-26 RX ORDER — MAGNESIUM HYDROXIDE/ALUMINUM HYDROXICE/SIMETHICONE 120; 1200; 1200 MG/30ML; MG/30ML; MG/30ML
30 SUSPENSION ORAL EVERY 6 HOURS PRN
Status: DISCONTINUED | OUTPATIENT
Start: 2021-03-26 | End: 2021-03-26 | Stop reason: HOSPADM

## 2021-03-26 RX ADMIN — IOHEXOL 85 ML: 350 INJECTION, SOLUTION INTRAVENOUS at 12:16

## 2021-03-26 RX ADMIN — SODIUM CHLORIDE 50 ML/HR: 0.9 INJECTION, SOLUTION INTRAVENOUS at 11:24

## 2021-03-26 RX ADMIN — LABETALOL HYDROCHLORIDE 600 MG: 200 TABLET, FILM COATED ORAL at 08:52

## 2021-03-26 RX ADMIN — HEPARIN SODIUM 5000 UNITS: 5000 INJECTION INTRAVENOUS; SUBCUTANEOUS at 05:40

## 2021-03-26 RX ADMIN — ASPIRIN 325 MG ORAL TABLET 325 MG: 325 PILL ORAL at 00:07

## 2021-03-26 RX ADMIN — HEPARIN SODIUM 5000 UNITS: 5000 INJECTION INTRAVENOUS; SUBCUTANEOUS at 13:18

## 2021-03-26 RX ADMIN — PANTOPRAZOLE SODIUM 40 MG: 40 TABLET, DELAYED RELEASE ORAL at 05:40

## 2021-03-26 RX ADMIN — LISINOPRIL 40 MG: 20 TABLET ORAL at 08:52

## 2021-03-26 NOTE — ASSESSMENT & PLAN NOTE
 Patient Presentation: Patient presents with complaints of chest pressure and occasional shortness of breath throughout the course of the day yesterday, 03/25/2021  She reports the symptoms worsen with activity  She reports elevated blood pressure readings at home including SBPs in the 190s   Likely etiology: Hypertensive urgency versus ACS versus PE   FREDDIE: 2   Initial Troponin: <0 02  o Trend x3 or to peak   Initial EKG: Normal sinus rhythm, heart rate 60   o Monitor on telemetry   Pain Control: 325 mg ASA   COVID/Influenza/RSV negative   NT-proBNP 249   Obtain d-dimer   Check fasting lipid panel and A1c   Admit under Observation Status
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
## 2021-03-26 NOTE — ASSESSMENT & PLAN NOTE
 Blood pressure is reviewed and acceptable   o Last recorded pressure: 167/73   Patient reports her PCP recently stopped her HCTZ due to renal function   Continue labetalol and lisinopril  Consider adding amlodipine as an adjunct therapy   Monitor blood pressure

## 2021-03-26 NOTE — DISCHARGE INSTR - AVS FIRST PAGE
Dear Med Loredo,     It was our pleasure to care for you here at [facility]  It is our hope that we were always able to exceed the expected standards for your care during your stay  You were hospitalized due to chest pressure  You were cared for on the 2nd floor by Matt Corrigan PA-C under the service of Fatou Swartz MD with the [facility] who covers for your primary care physician (PCP), Jatinder Capps MD, while you were hospitalized  If you have any questions or concerns related to this hospitalization, you may contact us at [phone]  For follow up as well as any medication refills, we recommend that you follow up with your primary care physician  A registered nurse will reach out to you by phone within a few days after your discharge to answer any additional questions that you may have after going home  However, at this time we provide for you here, the most important instructions / recommendations at discharge:     · Notable Medication Adjustments -   · We added clonidine 0 1 mg BID   · Continue labetalol and lisinopril as was prescribed earlier  · Testing Required after Discharge -   · Stress test  · Need CT scan in 6 months to follow pulmonary nodule  · Need thyroid nodule follow up with endocrinology  · Important follow up information -   · Follow up with this week with stress test results  · Other Instructions -   · If reoccurrence of symptoms, uncontrolled pain, shortness of breath please return to the Emergency Room to be evaluated  · Please review this entire after visit summary as additional general instructions including medication list, appointments, activity, diet, any pertinent wound care, and other additional recommendations from your care team that may be provided for you        Sincerely,     Matt Corrigan PA-C
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
## 2021-03-26 NOTE — PROGRESS NOTES
Patient with 3 days of mild CP and shortness of breath, -821U systolic  ON labetelol and lisinpril, unable to titrate up due to bradycardia and CKD

## 2021-03-26 NOTE — ASSESSMENT & PLAN NOTE
· troponins neg x3   · No events on telemetry  · D-dimer positive so CT PE study performed  · CT PE study -   "No pulmonary embolism   No acute consolidation   Incidentally detected right lower lobe lung nodule measuring about 3 mm  Based on current Fleischner Society 2017 Guidelines on incidental pulmonary nodule, because the patient is considered high risk for lung cancer, 12 month follow-up non-contrast   chest CT is recommended  1 8 cm left thyroid nodule which meets the size threshold criteria for evaluation with ultrasound for incidentally detected nodule follow-up notification     A follow-up notification and Significant finding notification has been created in Epic "      I did discuss these findings with the primary care physician and also gave clear discharge instructions it in both these nodules will need follow-up  Specifically the patient will also need to follow-up with endocrinology

## 2021-03-26 NOTE — TELEPHONE ENCOUNTER
Pt called to let Dr Shobha Malin know she is currently an inpatient at [facility] due to her BP  Told her I would inform Dr Julian Quintanilla   she said he can call her cell if any questions

## 2021-03-26 NOTE — INCIDENTAL FINDINGS
The following findings require follow up:  Radiographic finding   Finding: No pulmonary embolism   No acute consolidation   Incidentally detected right lower lobe lung nodule measuring about 3 mm  Based on current Fleischner Society 2017 Guidelines on incidental pulmonary nodule, because the patient is considered high risk for lung cancer, 12 month follow-up non-contrast   chest CT is recommended  1 8 cm left thyroid nodule which meets the size threshold criteria for evaluation with ultrasound for incidentally detected nodule follow-up notification     A follow-up notification and Significant finding notification has been created in Epic    Follow up required: as above  Follow up should be done within  week(s)    Please notify the following clinician to assist with the follow up:   Dr Chuck Bolaños

## 2021-03-26 NOTE — DISCHARGE SUMMARY
[facility]  Discharge- Hiren Boston 1950, 65 y o  female MRN: [id number]  Unit/Bed#: S -01 Encounter: [id number]  Primary Care Provider: Dougie Caban MD   Date and time admitted to hospital: 3/25/2021  9:53 PM    GERD (gastroesophageal reflux disease)  Assessment & Plan  · Continue PPI  Hyperlipidemia  Assessment & Plan  · Results noted   · No need for statin     Essential hypertension  Assessment & Plan    · Patient with elevated pressures while inpatient  · Added clonidine  · Will follow up with PCP    * Chest pressure  Assessment & Plan  · troponins neg x3   · No events on telemetry  · D-dimer positive so CT PE study performed  · CT PE study -   "No pulmonary embolism   No acute consolidation   Incidentally detected right lower lobe lung nodule measuring about 3 mm  Based on current Fleischner Society 2017 Guidelines on incidental pulmonary nodule, because the patient is considered high risk for lung cancer, 12 month follow-up non-contrast   chest CT is recommended  1 8 cm left thyroid nodule which meets the size threshold criteria for evaluation with ultrasound for incidentally detected nodule follow-up notification     A follow-up notification and Significant finding notification has been created in Epic "      I did discuss these findings with the primary care physician and also gave clear discharge instructions it in both these nodules will need follow-up  Specifically the patient will also need to follow-up with endocrinology              Discharging Physician / Practitioner: Celestine Szymanski MD  PCP: Dougie Caban MD  Admission Date:   Admission Orders (From admission, onward)     Ordered        03/26/21 0006  Place in Observation  Once                   Discharge Date: 03/26/21    Resolved Problems  Date Reviewed: 3/26/2021    None          Consultations During Hospital Stay:  · None     Procedures Performed:   · None     Significant Findings / Test Results:   · CXR: no acute disease on my read official read still pending  Incidental Findings:   · None      Test Results Pending at Discharge (will require follow up): · None      Outpatient Tests Requested:  · None     Complications:  None     Reason for Admission: chest pressure  Hospital Course:     Luanne Garcia is a 65 y o  female patient who originally presented to the hospital on 3/25/2021 due to chest pressure  Patient was admitted under observation status  She had negative troponins x3 while inpatient  No recurrence of chest pain while patient was admitted  She had no events noted on telemetry  Lipid panel unremarkable, no need for statin at this time  Patient is stable for discharge with follow-up with PCP  Will order an outpatient stress test given her FRDEDIE score of 2 and the fact that she has not had a stress test in the past     She trend D-dimer is positive CT CT PE study was performed she did get some amount of IV hydration and I have encouraged her to drink plenty given that she had contrast   She    She should have a BMP checked in 3 4 days time and follow-up with primary care physician  CT PE study did show a lung nodule and thyroid nodule both which will need follow-up  The lung nodule will need follow-up with a CT scan in 6-12 months time and the thyroid nodule will need further workup with endocrinology  I discussed these findings with the patient's PCP also discussed adjustments with blood pressure medication and added clonidine with PCP  Please see above list of diagnoses and related plan for additional information       Condition at Discharge: good     Discharge Day Visit / Exam:     Subjective:    Patient seen examined  No new symptoms  Feeling" fine"  Vitals: Blood Pressure: 160/72 (03/26/21 0333)  Pulse: 63 (03/26/21 0333)  Temperature: 98 2 °F (36 8 °C) (03/25/21 2153)  Temp Source: Oral (03/25/21 2153)  Respirations: 18 (03/26/21 0030)  Weight - Scale: 77 1 kg (170 lb) (03/25/21 2151)  SpO2: 96 % (03/26/21 0333)  Exam:   Physical Exam  Constitutional:       General: She is not in acute distress  Appearance: She is not ill-appearing, toxic-appearing or diaphoretic  HENT:      Head: Normocephalic  Nose: Nose normal       Mouth/Throat:      Mouth: Mucous membranes are moist    Eyes:      General: No scleral icterus  Right eye: No discharge  Left eye: No discharge  Pupils: Pupils are equal, round, and reactive to light  Cardiovascular:      Rate and Rhythm: Normal rate  Heart sounds: No murmur  No friction rub  No gallop  Pulmonary:      Effort: Pulmonary effort is normal    Abdominal:      General: There is no distension  Palpations: There is no mass  Tenderness: There is no abdominal tenderness  There is no right CVA tenderness, left CVA tenderness, guarding or rebound  Hernia: No hernia is present  Musculoskeletal:         General: No swelling, tenderness, deformity or signs of injury  Right lower leg: No edema  Left lower leg: No edema  Skin:     General: Skin is warm  Capillary Refill: Capillary refill takes less than 2 seconds  Coloration: Skin is pale  Skin is not jaundiced  Findings: No bruising, erythema, lesion or rash  Neurological:      General: No focal deficit present  Mental Status: She is alert  Cranial Nerves: No cranial nerve deficit  Sensory: No sensory deficit  Motor: No weakness  Coordination: Coordination normal       Gait: Gait normal       Deep Tendon Reflexes: Reflexes normal    Psychiatric:         Mood and Affect: Mood normal          Discussion with Family: patient     Discharge instructions/Information to patient and family:   See after visit summary for information provided to patient and family        Provisions for Follow-Up Care:  See after visit summary for information related to follow-up care and any pertinent home health orders  Disposition:     Home    For Discharges to [facility]F:   · Not Applicable to this Patient - Not Applicable to this Patient    Planned Readmission: none      Discharge Statement:  I spent 45 minutes discharging the patient  This time was spent on the day of discharge  I had direct contact with the patient on the day of discharge  Greater than 50% of the total time was spent examining patient, answering all patient questions, arranging and discussing plan of care with patient as well as directly providing post-discharge instructions  Additional time then spent on discharge activities  Discharge Medications:  See after visit summary for reconciled discharge medications provided to patient and family        ** Please Note: This note has been constructed using a voice recognition system **

## 2021-03-29 ENCOUNTER — TRANSITIONAL CARE MANAGEMENT (OUTPATIENT)
Dept: INTERNAL MEDICINE CLINIC | Age: 71
End: 2021-03-29

## 2021-03-30 ENCOUNTER — NURSE TRIAGE (OUTPATIENT)
Dept: OTHER | Facility: OTHER | Age: 71
End: 2021-03-30

## 2021-03-30 ENCOUNTER — OFFICE VISIT (OUTPATIENT)
Dept: INTERNAL MEDICINE CLINIC | Facility: CLINIC | Age: 71
End: 2021-03-30
Payer: MEDICARE

## 2021-03-30 VITALS
DIASTOLIC BLOOD PRESSURE: 70 MMHG | BODY MASS INDEX: 31.43 KG/M2 | WEIGHT: 170.8 LBS | TEMPERATURE: 98.8 F | HEART RATE: 57 BPM | SYSTOLIC BLOOD PRESSURE: 146 MMHG | OXYGEN SATURATION: 98 % | HEIGHT: 62 IN

## 2021-03-30 DIAGNOSIS — A69.20 LYME DISEASE: ICD-10-CM

## 2021-03-30 DIAGNOSIS — E04.1 THYROID NODULE: ICD-10-CM

## 2021-03-30 DIAGNOSIS — R91.1 RIGHT LOWER LOBE PULMONARY NODULE: ICD-10-CM

## 2021-03-30 DIAGNOSIS — D64.89 ANEMIA DUE TO OTHER CAUSE, NOT CLASSIFIED: ICD-10-CM

## 2021-03-30 DIAGNOSIS — I10 ESSENTIAL HYPERTENSION: Primary | ICD-10-CM

## 2021-03-30 DIAGNOSIS — R06.02 SHORTNESS OF BREATH: ICD-10-CM

## 2021-03-30 PROBLEM — D64.9 ANEMIA: Status: ACTIVE | Noted: 2021-03-30

## 2021-03-30 PROCEDURE — 99496 TRANSJ CARE MGMT HIGH F2F 7D: CPT | Performed by: INTERNAL MEDICINE

## 2021-03-30 RX ORDER — VALSARTAN 320 MG/1
320 TABLET ORAL DAILY
Qty: 30 TABLET | Refills: 2 | Status: SHIPPED | OUTPATIENT
Start: 2021-03-30 | End: 2021-07-13 | Stop reason: ALTCHOICE

## 2021-03-30 NOTE — ASSESSMENT & PLAN NOTE
Will recheck CBC since her in-hospital hemoglobin revealed a drop of 1 g from her baseline without any clinical evidence of bleeding /blood loss

## 2021-03-30 NOTE — TELEPHONE ENCOUNTER
Reason for Disposition   Caller has medication question only, adult not sick, and triager answers question    Answer Assessment - Initial Assessment Questions  1  NAME of MEDICATION: "What medicine are you calling about?"     Valsartan 320 mg PO  2    QUESTION: "What is your question?"      "Should I take the full tablet or break in half"  3  PRESCRIBING HCP: "Who prescribed it?" Reason: if prescribed by specialist, call should be referred to that group       Roman Smart    Protocols used: MEDICATION QUESTION CALL[facility]

## 2021-03-30 NOTE — ASSESSMENT & PLAN NOTE
Will re-evaluate once blood pressure is controlled    Will consider pharmacologic stress testing or if patient is able exercise stress test with echocardiogram
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
## 2021-03-30 NOTE — ASSESSMENT & PLAN NOTE
1 8 cm thyroid nodule left thyroid lobe  Thyroid function studies have been requested  Ultrasound has been requested    Patient is requesting a referral to an endocrinologist which was given to her to see Dr Sterling Barcenas

## 2021-03-30 NOTE — TELEPHONE ENCOUNTER
Reviewed recent office visit with patient and patient is to take full tablet of Valsartan 320 mg PO  Patient verbalized understanding

## 2021-03-30 NOTE — ASSESSMENT & PLAN NOTE
Blood pressure control remains suboptimal at 146/70 will change her lisinopril to valsartan 320 mg and follow-up in 2 weeks    She is not taking clonidine after reading the side effects

## 2021-03-30 NOTE — TELEPHONE ENCOUNTER
Regarding: medication question   ----- Message from Kylah Loera sent at 3/30/2021  5:56 PM EDT -----  " I have some questions regarding valsartan medication I just picked up "

## 2021-04-01 ENCOUNTER — APPOINTMENT (OUTPATIENT)
Dept: LAB | Facility: HOSPITAL | Age: 71
End: 2021-04-01
Attending: INTERNAL MEDICINE
Payer: MEDICARE

## 2021-04-01 ENCOUNTER — HOSPITAL ENCOUNTER (OUTPATIENT)
Dept: ULTRASOUND IMAGING | Facility: HOSPITAL | Age: 71
Discharge: HOME | End: 2021-04-01
Payer: MEDICARE

## 2021-04-01 DIAGNOSIS — A69.20 LYME DISEASE: ICD-10-CM

## 2021-04-01 DIAGNOSIS — I10 ESSENTIAL HYPERTENSION: ICD-10-CM

## 2021-04-01 DIAGNOSIS — R07.89 CHEST PRESSURE: ICD-10-CM

## 2021-04-01 DIAGNOSIS — E04.1 THYROID NODULE: ICD-10-CM

## 2021-04-01 LAB
ANION GAP SERPL CALCULATED.3IONS-SCNC: 7 MMOL/L (ref 4–13)
BUN SERPL-MCNC: 24 MG/DL (ref 6–20)
CALCIUM SERPL-MCNC: 9.4 MG/DL (ref 8.4–10.2)
CHLORIDE SERPL-SCNC: 108 MMOL/L (ref 96–108)
CO2 SERPL-SCNC: 26 MMOL/L (ref 22–33)
CREAT SERPL-MCNC: 1.21 MG/DL (ref 0.4–1.1)
ERYTHROCYTE [DISTWIDTH] IN BLOOD BY AUTOMATED COUNT: 13.7 % (ref 11.6–15.1)
GFR SERPL CREATININE-BSD FRML MDRD: 45 ML/MIN/1.73SQ M
GLUCOSE SERPL-MCNC: 104 MG/DL (ref 65–140)
HCT VFR BLD AUTO: 37.7 % (ref 34.8–46.1)
HGB BLD-MCNC: 12.2 G/DL (ref 11.5–15.4)
MCH RBC QN AUTO: 28.2 PG (ref 26.8–34.3)
MCHC RBC AUTO-ENTMCNC: 32.4 G/DL (ref 31.4–37.4)
MCV RBC AUTO: 87 FL (ref 82–98)
PLATELET # BLD AUTO: 267 THOUSANDS/UL (ref 149–390)
PMV BLD AUTO: 9.9 FL (ref 8.9–12.7)
POTASSIUM SERPL-SCNC: 4.4 MMOL/L (ref 3.5–5)
RBC # BLD AUTO: 4.33 MILLION/UL (ref 3.81–5.12)
SODIUM SERPL-SCNC: 141 MMOL/L (ref 133–145)
T3 SERPL-MCNC: 0.8 NG/ML (ref 0.6–1.8)
T4 FREE SERPL-MCNC: 1.14 NG/DL (ref 0.76–1.46)
TSH SERPL DL<=0.05 MIU/L-ACNC: 1.46 UIU/ML (ref 0.34–5.6)
WBC # BLD AUTO: 5.85 THOUSAND/UL (ref 4.31–10.16)

## 2021-04-01 PROCEDURE — 80048 BASIC METABOLIC PNL TOTAL CA: CPT

## 2021-04-01 PROCEDURE — 84439 ASSAY OF FREE THYROXINE: CPT

## 2021-04-01 PROCEDURE — 84443 ASSAY THYROID STIM HORMONE: CPT

## 2021-04-01 PROCEDURE — 84480 ASSAY TRIIODOTHYRONINE (T3): CPT

## 2021-04-01 PROCEDURE — 85027 COMPLETE CBC AUTOMATED: CPT

## 2021-04-01 PROCEDURE — 76536 US EXAM OF HEAD AND NECK: CPT

## 2021-04-01 PROCEDURE — 36415 COLL VENOUS BLD VENIPUNCTURE: CPT

## 2021-04-02 ENCOUNTER — TELEPHONE (OUTPATIENT)
Dept: INTERNAL MEDICINE CLINIC | Facility: CLINIC | Age: 71
End: 2021-04-02

## 2021-04-02 NOTE — TELEPHONE ENCOUNTER
There is nothing of significance that is abnormal in her labs    Her thyroid results are not back yet I will contact the patient when those results are available and I will discuss what ever questions she has about her her labs at that time

## 2021-04-02 NOTE — TELEPHONE ENCOUNTER
Patient saw cardiologist today  Was informed some of her testing was abnormal   Patient looked at her myChart and saw one test that was listed as boderline abnormal   Patient would like to discuss with Dr Dorothy Marina  Please advise  Patient also asking if her US results are in and if they can be reviewed with her as well  Patient can be reached at [phone]

## 2021-04-05 NOTE — TELEPHONE ENCOUNTER
Patient just called back for thyroid ultrasound results  Confirmed that we did receive the results however per Dr Lina Hathaway last note he will be following up with patient with results  Patient understood and will be waiting for the call

## 2021-04-06 NOTE — TELEPHONE ENCOUNTER
Left message on patient's answering machine regarding thyroid ultrasound results and need for follow-up ultrasound in 1 year

## 2021-04-27 ENCOUNTER — TELEPHONE (OUTPATIENT)
Dept: CARDIOLOGY CLINIC | Facility: CLINIC | Age: 71
End: 2021-04-27

## 2021-04-27 NOTE — TELEPHONE ENCOUNTER
Spoke with pt to confirm appt but pt stated she will no longer be coming into the office for appointments   Apologized & informed pt I will cancel the patient per her request

## 2021-05-11 ENCOUNTER — TELEPHONE (OUTPATIENT)
Dept: INTERNAL MEDICINE CLINIC | Facility: CLINIC | Age: 71
End: 2021-05-11

## 2021-05-11 NOTE — TELEPHONE ENCOUNTER
Received a return fax from [facility]-Endocrinology that patient no showed for 5/11/21 Endo consult

## 2021-05-22 ENCOUNTER — IMMUNIZATIONS (OUTPATIENT)
Dept: FAMILY MEDICINE CLINIC | Facility: HOSPITAL | Age: 71
End: 2021-05-22

## 2021-05-22 DIAGNOSIS — Z23 ENCOUNTER FOR IMMUNIZATION: Primary | ICD-10-CM

## 2021-05-22 PROCEDURE — 91300 SARS-COV-2 / COVID-19 MRNA VACCINE (PFIZER-BIONTECH) 30 MCG: CPT

## 2021-05-22 PROCEDURE — 0001A SARS-COV-2 / COVID-19 MRNA VACCINE (PFIZER-BIONTECH) 30 MCG: CPT

## 2021-05-27 ENCOUNTER — CLINICAL SUPPORT (OUTPATIENT)
Dept: CARDIAC REHAB | Facility: CLINIC | Age: 71
End: 2021-05-27
Payer: MEDICARE

## 2021-05-27 DIAGNOSIS — Z98.61 POSTSURGICAL PERCUTANEOUS TRANSLUMINAL CORONARY ANGIOPLASTY STATUS: ICD-10-CM

## 2021-05-27 PROCEDURE — 93797 PHYS/QHP OP CAR RHAB WO ECG: CPT

## 2021-05-27 NOTE — PROGRESS NOTES
Cardiac Rehabilitation Plan of Care   Initial Care Plan          Today's date: 2021   # of Exercise Sessions Completed: initial  Patient name: Rut Chiu      : 1950  Age: 65 y o  MRN: [id number]  Referring Physician: Pascual Linares  Cardiologist: Pascual Linares  Provider: DARIUSZ MCBRIDE [facility]  Clinician: Geno Sandy RN    Dx:   Encounter Diagnosis   Name Primary?  Postsurgical percutaneous transluminal coronary angioplasty status      Date of onset: 2021      SUMMARY OF PROGRESS:  Completed initial evaluation  Explained cardiac rehabilitation, cardiac risk factors, how the heart functions,THR and heart healthy diet  Completed sub max treadmill test  Obtained BMI and waist measurements  Telemetry monitor NSR at rest and with exercise  Exercise MET level 4 29 during sub max treadmill test  Reached 30 beats above resting  RPE 4-7  Tolerated exercise well  Denied any complaint of discomfort  Provided her with handbook for cardiac rehabilitation  She stated her goal was to increase her activity without shortness of breath aand fatigue  She also would like to continue with her weight loss by increasing activity and consuming a heart healthy diet  Prior to PTCA she stated she was consuming [employer] daily  Since procedure she dines out once a week  She has decreased consumption of sweets, cakes, cookies, ice cream and red meat  She plans to return to work next week  Will continue to monitor       Medication compliance: Yes   Comments: Pt reports to be compliant with medications  Fall Risk: Low   Comments: Ambulates with a steady gait with no assist device and Denies a fall in the past 6 months    EKG Interpretation: NSr      EXERCISE ASSESSMENT and PLAN    Current Exercise Program in Rehab:       Frequency: 1 days/week    Minutes: 9         METS: 4 29 during sub max            HR: 65-95   RPE: 4-7         Modalities: Treadmill      Exercise Progression 30 Day Goals :    Frequency: 3 days/week of cardiac rehab     Supplement with home exercise 2+ days/wk as tolerated    Minutes: 30-50   >150 mins/wk of moderate intensity exercise   METS: 2 5-5 8   HR: 20-30 BPM above resting    RPE: 4-6   Modalities: Treadmill, UBE, NuStep and Recumbent bike    Strength trainin-3 days / week  12-15 repetitions  1-2 sets per modality   Will be added following 2-3 weeks of monitored exercise sessions   Modalities: Lateral Raise, Arm Curl, Upright Rows, Front Raises and Shoulder Shrugs    Home Exercise: Type: walking, Frequency: 5 to 7 days/week, Duration: 15 mins    Goals: 10% improvement in functional capacity - based on max METs achieved in fitness assessment, Return to work unrestricted, Attend Rehab regularly and increase walking time at home, increase endurance to improve to decrease fatigue and shortness of breath    Progression Toward Goals:  Reviewed Pt goals and determined plan of care, Will continue to educate and progress as tolerated      Education: benefit of exercise for CAD risk factors, home exercise guidelines, AHA guidelines to achieve >150 mins/wk of moderate exercise and RPE scale   Plan:education on home exercise guidelines and home exercise 30+ mins 2 days opposite CR  Readiness to change: Preparation:  (Getting ready to change)       NUTRITION ASSESSMENT AND PLAN    Weight control:    Starting weight: 158 5   Current weight:     Waist circumference:    Startin 5   Current:      Diabetes: N/A  A1c: N/A    last measured: N/A    Lipid management: Discussed diet and lipid management and Last lipid profile 3/25/2021  Chol 170    HDL 53  LDL 91    Goals:Eat 4-5 cups of fruits and vegetables daily, choose low sodium processed foods, choose healthy snacks: light popcorn, plain pretzels, seldom eat or choose low fat ice-cream, fruit juice bars or frozen yogurt  and decrease dining at fast foods    Progression Toward Goals: Reviewed Pt goals and determined plan of care, Will continue to educate and progress as tolerated  Education: heart healthy eating  nutrition for  lipid management  wt  loss   Plan: Education class: Reading Food Labels, Education Class: Heart Healthy Eating, replace unhealthy snacks with fruits & vegs, eat fewer desserts and sweets, avoid processed foods and learn how to read food labels  Readiness to change: Pre-Contemplation:   (Not yet acknowledging that there is a problem behavior that needs to change) and Preparation:  (Getting ready to change)       PSYCHOSOCIAL ASSESSMENT AND PLAN    Emotional:  Depression assessment:  PHQ-9 = 1-4 = Minimal Depression            Anxiety measure:  MARTINA-7 = 0-4  = Not anxious  Self-reported stress level:  zero  Social support: Excellent, Patient reports excellent emotional/social support from family and Patient has friends available for support when needed     Goals:  increased energy    Progression Toward Goals: Reviewed Pt goals and determined plan of care, Will continue to educate and progress as tolerated      Education: signs/sxs of depression, benefits of a positive support system, stress management techniques, depression and CAD and benefits of mental health counseling  Plan: Practice relaxation techniques, Exercise and Keep a positive mindset  Readiness to change: Action:  (Changing behavior)      OTHER CORE COMPONENTS     Tobacco:   Social History     Tobacco Use   Smoking Status Former Smoker    Packs/day: 0 50    Years: 8 00    Pack years: 4 00    Types: Cigarettes    Quit date: 1968    Years since quittin 4   Smokeless Tobacco Never Used   Tobacco Comment    1 pack per day and no passive smoke exposure       Tobacco Use Intervention:   N/A:  Patient is a non-smoker   Pt quit    and has abstained    Anginal Symptoms:  None   NTG use: No prescription    Blood pressure:    Restin/72   Exercise: 154/76    Goals: consistent BP < 130/80, reduced dietary sodium <2300mg, moderate intensity exercise >150 mins/wk and medication compliance    Progression Toward Goals: Reviewed Pt goals and determined plan of care, Will continue to educate and progress as tolerated      Education:  understanding high blood pressure and it's relationship to CAD and low sodium diet and HTN  Plan: Class: Understanding Heart Disease, Class: Common Heart Medications, Avoid Processed foods, engage in regular exercise and check labels for sodium content  Readiness to change: Preparation:  (Getting ready to change)
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
## 2021-06-02 ENCOUNTER — CLINICAL SUPPORT (OUTPATIENT)
Dept: CARDIAC REHAB | Facility: CLINIC | Age: 71
End: 2021-06-02
Payer: MEDICARE

## 2021-06-02 DIAGNOSIS — Z98.61 POSTSURGICAL PERCUTANEOUS TRANSLUMINAL CORONARY ANGIOPLASTY STATUS: ICD-10-CM

## 2021-06-02 PROCEDURE — 93798 PHYS/QHP OP CAR RHAB W/ECG: CPT

## 2021-06-04 ENCOUNTER — APPOINTMENT (OUTPATIENT)
Dept: CARDIAC REHAB | Facility: CLINIC | Age: 71
End: 2021-06-04
Payer: MEDICARE

## 2021-06-07 ENCOUNTER — CLINICAL SUPPORT (OUTPATIENT)
Dept: CARDIAC REHAB | Facility: CLINIC | Age: 71
End: 2021-06-07
Payer: MEDICARE

## 2021-06-07 DIAGNOSIS — Z98.61 POSTSURGICAL PERCUTANEOUS TRANSLUMINAL CORONARY ANGIOPLASTY STATUS: ICD-10-CM

## 2021-06-07 PROCEDURE — 93798 PHYS/QHP OP CAR RHAB W/ECG: CPT

## 2021-06-09 ENCOUNTER — CLINICAL SUPPORT (OUTPATIENT)
Dept: CARDIAC REHAB | Facility: CLINIC | Age: 71
End: 2021-06-09
Payer: MEDICARE

## 2021-06-09 DIAGNOSIS — Z98.61 POSTSURGICAL PERCUTANEOUS TRANSLUMINAL CORONARY ANGIOPLASTY STATUS: ICD-10-CM

## 2021-06-09 PROCEDURE — 93798 PHYS/QHP OP CAR RHAB W/ECG: CPT

## 2021-06-12 ENCOUNTER — IMMUNIZATIONS (OUTPATIENT)
Dept: FAMILY MEDICINE CLINIC | Facility: HOSPITAL | Age: 71
End: 2021-06-12

## 2021-06-12 DIAGNOSIS — Z23 ENCOUNTER FOR IMMUNIZATION: Primary | ICD-10-CM

## 2021-06-12 PROCEDURE — 91300 SARS-COV-2 / COVID-19 MRNA VACCINE (PFIZER-BIONTECH) 30 MCG: CPT

## 2021-06-12 PROCEDURE — 0002A SARS-COV-2 / COVID-19 MRNA VACCINE (PFIZER-BIONTECH) 30 MCG: CPT

## 2021-06-14 ENCOUNTER — CLINICAL SUPPORT (OUTPATIENT)
Dept: CARDIAC REHAB | Facility: CLINIC | Age: 71
End: 2021-06-14
Payer: MEDICARE

## 2021-06-14 DIAGNOSIS — Z98.61 POSTSURGICAL PERCUTANEOUS TRANSLUMINAL CORONARY ANGIOPLASTY STATUS: Primary | ICD-10-CM

## 2021-06-14 PROCEDURE — 93798 PHYS/QHP OP CAR RHAB W/ECG: CPT

## 2021-06-16 ENCOUNTER — CLINICAL SUPPORT (OUTPATIENT)
Dept: CARDIAC REHAB | Facility: CLINIC | Age: 71
End: 2021-06-16
Payer: MEDICARE

## 2021-06-16 DIAGNOSIS — Z98.61 POSTSURGICAL PERCUTANEOUS TRANSLUMINAL CORONARY ANGIOPLASTY STATUS: ICD-10-CM

## 2021-06-16 PROCEDURE — 93798 PHYS/QHP OP CAR RHAB W/ECG: CPT

## 2021-06-25 NOTE — PROGRESS NOTES
Cardiac Rehabilitation Plan of Care   Discharge          Today's date: 2021   # of Exercise Sessions Completed: 6  Patient name: Gabrielle Monroy      : 1950  Age: 65 y o  MRN: [id number]  Referring Physician: Sanket Bui  Cardiologist: Sanket Bui  Provider: Luana  Clinician: Leo Sandy RN    Dx:   Encounter Diagnosis   Name Primary?  Postsurgical percutaneous transluminal coronary angioplasty status      Date of onset: 2021      SUMMARY OF PROGRESS:    2021 Mrs Lalo Dunn left a voicemail stated she will be discontinuing the cardiac rehabilitation due to work commitments  She stated she has a treadmill at home and will continue to follow up with her cardiologist  Selin Phillip to complete outcome evaluation due to patient has not returned to cardiac rehabilitation since session 6 on 2021  She has been discharged from the program  No change to previous not except discharged  2021 Mrs Lalo Dunn completed 6 sessions in 30 days of the cardiac rehabilitation program  She missed several sessions  Explained cardiac risk factors, how the heart functions,THR and heart healthy diet  She completes 34 minutes cardiovascular exercise  Telemetry monitor NSR rare PAC at rest and with exercise  Exercise MET level 3 4-3 9 MET's  RPE 4-5  She c/o chronic bilateral leg fatigue  Denied any complaint of chest discomfort  Provided her with handbook for cardiac rehabilitation  She stated her goal was to increase her activity without shortness of breath and fatigue  She also would like to continue with her weight loss by increasing activity and consuming a heart healthy diet  Prior to PTCA she stated she was consuming [employer] daily  Since procedure she dines out once a week  She has decreased consumption of sweets, cakes, cookies, ice cream and red meat  She plans to return to work next week  Will continue to monitor       Medication compliance: Yes   Comments: Pt reports to be compliant with medications  Fall Risk: Low   Comments: Ambulates with a steady gait with no assist device and Denies a fall in the past 6 months    EKG Interpretation: NSr      EXERCISE ASSESSMENT and PLAN    Current Exercise Program in Rehab:       Frequency: 0-3 days/week    Minutes: 34         METS: 3 4-3 9            HR:    RPE: 4-5         Modalities: Treadmill, NuStep and Recumbent bike      Exercise Progression 30 Day Goals :    Frequency: 3 days/week of cardiac rehab     Supplement with home exercise 2+ days/wk as tolerated    Minutes: 30-50   >150 mins/wk of moderate intensity exercise   METS: 2 5-5 8   HR: 20-30 BPM above resting    RPE: 4-6   Modalities: Treadmill, UBE, NuStep and Recumbent bike    Strength trainin-3 days / week  12-15 repetitions  1-2 sets per modality   Will be added following 2-3 weeks of monitored exercise sessions   Modalities: Lateral Raise, Arm Curl, Upright Rows, Front Raises and Shoulder Shrugs    Home Exercise: Type: walking, Frequency: 5 to 7 days/week, Duration: 15 mins    Goals: 10% improvement in functional capacity - based on max METs achieved in fitness assessment, Return to work unrestricted, Attend Rehab regularly and increase walking time at home, increase endurance to improve to decrease fatigue and shortness of breath    Progression Toward Goals:  Reviewed Pt goals and determined plan of care, Will continue to educate and progress as tolerated      Education: benefit of exercise for CAD risk factors, home exercise guidelines, AHA guidelines to achieve >150 mins/wk of moderate exercise and RPE scale , exercise tips in extreme weather  Plan:education on home exercise guidelines and home exercise 30+ mins 2 days opposite CR  Readiness to change: Preparation:  (Getting ready to change)       NUTRITION ASSESSMENT AND PLAN    Weight control:    Starting weight: 158 5   Current weight:     Waist circumference:    Startin 5   Current:      Diabetes: N/A  A1c: N/A    last measured: N/A    Lipid management: Discussed diet and lipid management and Last lipid profile 3/25/2021  Chol 170    HDL 53  LDL 91    Goals:Eat 4-5 cups of fruits and vegetables daily, choose low sodium processed foods, choose healthy snacks: light popcorn, plain pretzels, seldom eat or choose low fat ice-cream, fruit juice bars or frozen yogurt  and decrease dining at fast foods    Progression Toward Goals: Reviewed Pt goals and determined plan of care, Will continue to educate and progress as tolerated  Education: heart healthy eating  nutrition for  lipid management  wt  loss   Plan: Education class: Reading Food Labels, Education Class: Heart Healthy Eating, replace unhealthy snacks with fruits & vegs, eat fewer desserts and sweets, avoid processed foods and learn how to read food labels  Readiness to change: Pre-Contemplation:   (Not yet acknowledging that there is a problem behavior that needs to change) and Preparation:  (Getting ready to change)       PSYCHOSOCIAL ASSESSMENT AND PLAN    Emotional:  Depression assessment:  PHQ-9 = 1-4 = Minimal Depression            Anxiety measure:  MARTINA-7 = 0-4  = Not anxious  Self-reported stress level:  zero  Social support: Excellent, Patient reports excellent emotional/social support from family and Patient has friends available for support when needed     Goals:  increased energy    Progression Toward Goals: Reviewed Pt goals and determined plan of care, Will continue to educate and progress as tolerated      Education: signs/sxs of depression, benefits of a positive support system, stress management techniques, depression and CAD and benefits of mental health counseling  Plan: Practice relaxation techniques, Exercise and Keep a positive mindset  Readiness to change: Action:  (Changing behavior)      OTHER CORE COMPONENTS     Tobacco:   Social History     Tobacco Use   Smoking Status Former Smoker    Packs/day: 0 50    Years: 8 00    Pack years: 4 00    Types: Cigarettes    Quit date: 1968    Years since quittin 5   Smokeless Tobacco Never Used   Tobacco Comment    1 pack per day and no passive smoke exposure       Tobacco Use Intervention:   N/A:  Patient is a non-smoker   Pt quit    and has abstained    Anginal Symptoms:  None   NTG use: No prescription    Blood pressure:    Restin//72   Exercise: 144//70    Goals: consistent BP < 130/80, reduced dietary sodium <2300mg, moderate intensity exercise >150 mins/wk and medication compliance    Progression Toward Goals: Reviewed Pt goals and determined plan of care, Will continue to educate and progress as tolerated      Education:  understanding high blood pressure and it's relationship to CAD and low sodium diet and HTN, understanding CAD, cardiac risk factors  Plan: Class: Understanding Heart Disease, Class: Common Heart Medications, Avoid Processed foods, engage in regular exercise and check labels for sodium content  Readiness to change: Preparation:  (Getting ready to change)

## 2021-06-28 ENCOUNTER — TELEPHONE (OUTPATIENT)
Dept: CARDIAC REHAB | Facility: CLINIC | Age: 71
End: 2021-06-28

## 2021-06-28 ENCOUNTER — APPOINTMENT (OUTPATIENT)
Dept: CARDIAC REHAB | Facility: CLINIC | Age: 71
End: 2021-06-28
Payer: MEDICARE

## 2021-06-30 ENCOUNTER — APPOINTMENT (OUTPATIENT)
Dept: CARDIAC REHAB | Facility: CLINIC | Age: 71
End: 2021-06-30
Payer: MEDICARE

## 2021-07-11 ENCOUNTER — HOSPITAL ENCOUNTER (EMERGENCY)
Facility: HOSPITAL | Age: 71
Discharge: HOME | End: 2021-07-11
Attending: EMERGENCY MEDICINE
Payer: MEDICARE

## 2021-07-11 ENCOUNTER — APPOINTMENT (EMERGENCY)
Dept: CT IMAGING | Facility: HOSPITAL | Age: 71
End: 2021-07-11
Payer: MEDICARE

## 2021-07-11 VITALS
HEART RATE: 82 BPM | BODY MASS INDEX: 28.6 KG/M2 | SYSTOLIC BLOOD PRESSURE: 146 MMHG | TEMPERATURE: 99 F | OXYGEN SATURATION: 99 % | WEIGHT: 155.8 LBS | DIASTOLIC BLOOD PRESSURE: 74 MMHG | RESPIRATION RATE: 18 BRPM

## 2021-07-11 DIAGNOSIS — N30.00 ACUTE CYSTITIS: Primary | ICD-10-CM

## 2021-07-11 LAB
ALBUMIN SERPL BCP-MCNC: 3.3 G/DL (ref 3.5–5)
ALP SERPL-CCNC: 117 U/L (ref 46–116)
ALT SERPL W P-5'-P-CCNC: 34 U/L (ref 12–78)
ANION GAP SERPL CALCULATED.3IONS-SCNC: 10 MMOL/L (ref 4–13)
AST SERPL W P-5'-P-CCNC: 20 U/L (ref 5–45)
BASOPHILS # BLD AUTO: 0.02 THOUSANDS/ΜL (ref 0–0.1)
BASOPHILS NFR BLD AUTO: 0 % (ref 0–1)
BILIRUB SERPL-MCNC: 0.4 MG/DL (ref 0.2–1)
BILIRUB UR QL STRIP: NEGATIVE
BUN SERPL-MCNC: 26 MG/DL (ref 5–25)
CALCIUM ALBUM COR SERPL-MCNC: 9.4 MG/DL (ref 8.3–10.1)
CALCIUM SERPL-MCNC: 8.8 MG/DL (ref 8.3–10.1)
CHLORIDE SERPL-SCNC: 107 MMOL/L (ref 100–108)
CLARITY UR: CLEAR
CO2 SERPL-SCNC: 24 MMOL/L (ref 21–32)
COLOR UR: YELLOW
CREAT SERPL-MCNC: 1.1 MG/DL (ref 0.6–1.3)
EOSINOPHIL # BLD AUTO: 0.19 THOUSAND/ΜL (ref 0–0.61)
EOSINOPHIL NFR BLD AUTO: 2 % (ref 0–6)
ERYTHROCYTE [DISTWIDTH] IN BLOOD BY AUTOMATED COUNT: 13.6 % (ref 11.6–15.1)
GFR SERPL CREATININE-BSD FRML MDRD: 50 ML/MIN/1.73SQ M
GLUCOSE SERPL-MCNC: 105 MG/DL (ref 65–140)
GLUCOSE UR STRIP-MCNC: NEGATIVE MG/DL
HCT VFR BLD AUTO: 38.2 % (ref 34.8–46.1)
HGB BLD-MCNC: 12.6 G/DL (ref 11.5–15.4)
HGB UR QL STRIP.AUTO: NEGATIVE
IMM GRANULOCYTES # BLD AUTO: 0.03 THOUSAND/UL (ref 0–0.2)
IMM GRANULOCYTES NFR BLD AUTO: 0 % (ref 0–2)
KETONES UR STRIP-MCNC: NEGATIVE MG/DL
LEUKOCYTE ESTERASE UR QL STRIP: NEGATIVE
LIPASE SERPL-CCNC: 279 U/L (ref 73–393)
LYMPHOCYTES # BLD AUTO: 1.5 THOUSANDS/ΜL (ref 0.6–4.47)
LYMPHOCYTES NFR BLD AUTO: 18 % (ref 14–44)
MCH RBC QN AUTO: 28.8 PG (ref 26.8–34.3)
MCHC RBC AUTO-ENTMCNC: 33 G/DL (ref 31.4–37.4)
MCV RBC AUTO: 87 FL (ref 82–98)
MONOCYTES # BLD AUTO: 1 THOUSAND/ΜL (ref 0.17–1.22)
MONOCYTES NFR BLD AUTO: 12 % (ref 4–12)
NEUTROPHILS # BLD AUTO: 5.45 THOUSANDS/ΜL (ref 1.85–7.62)
NEUTS SEG NFR BLD AUTO: 68 % (ref 43–75)
NITRITE UR QL STRIP: NEGATIVE
NRBC BLD AUTO-RTO: 0 /100 WBCS
PH UR STRIP.AUTO: 7 [PH] (ref 4.5–8)
PLATELET # BLD AUTO: 271 THOUSANDS/UL (ref 149–390)
PMV BLD AUTO: 9.4 FL (ref 8.9–12.7)
POTASSIUM SERPL-SCNC: 4.9 MMOL/L (ref 3.5–5.3)
PROT SERPL-MCNC: 7.3 G/DL (ref 6.4–8.2)
PROT UR STRIP-MCNC: NEGATIVE MG/DL
RBC # BLD AUTO: 4.38 MILLION/UL (ref 3.81–5.12)
SODIUM SERPL-SCNC: 141 MMOL/L (ref 136–145)
SP GR UR STRIP.AUTO: 1.01 (ref 1–1.03)
UROBILINOGEN UR QL STRIP.AUTO: 0.2 E.U./DL
WBC # BLD AUTO: 8.19 THOUSAND/UL (ref 4.31–10.16)

## 2021-07-11 PROCEDURE — 99284 EMERGENCY DEPT VISIT MOD MDM: CPT | Performed by: PHYSICIAN ASSISTANT

## 2021-07-11 PROCEDURE — 96360 HYDRATION IV INFUSION INIT: CPT

## 2021-07-11 PROCEDURE — 83690 ASSAY OF LIPASE: CPT | Performed by: EMERGENCY MEDICINE

## 2021-07-11 PROCEDURE — 96361 HYDRATE IV INFUSION ADD-ON: CPT

## 2021-07-11 PROCEDURE — 74177 CT ABD & PELVIS W/CONTRAST: CPT

## 2021-07-11 PROCEDURE — 99284 EMERGENCY DEPT VISIT MOD MDM: CPT

## 2021-07-11 PROCEDURE — G1004 CDSM NDSC: HCPCS

## 2021-07-11 PROCEDURE — 80053 COMPREHEN METABOLIC PANEL: CPT | Performed by: EMERGENCY MEDICINE

## 2021-07-11 PROCEDURE — 85025 COMPLETE CBC W/AUTO DIFF WBC: CPT | Performed by: EMERGENCY MEDICINE

## 2021-07-11 PROCEDURE — 81003 URINALYSIS AUTO W/O SCOPE: CPT

## 2021-07-11 PROCEDURE — 36415 COLL VENOUS BLD VENIPUNCTURE: CPT

## 2021-07-11 PROCEDURE — 87086 URINE CULTURE/COLONY COUNT: CPT | Performed by: PHYSICIAN ASSISTANT

## 2021-07-11 RX ORDER — CARVEDILOL 12.5 MG/1
12.5 TABLET ORAL DAILY
COMMUNITY
Start: 2021-04-21

## 2021-07-11 RX ORDER — ASPIRIN 81 MG/1
81 TABLET, CHEWABLE ORAL DAILY
COMMUNITY
Start: 2021-04-21

## 2021-07-11 RX ORDER — ATORVASTATIN CALCIUM 40 MG/1
40 TABLET, FILM COATED ORAL
COMMUNITY
Start: 2021-04-20

## 2021-07-11 RX ORDER — NITROFURANTOIN 25; 75 MG/1; MG/1
100 CAPSULE ORAL ONCE
Status: COMPLETED | OUTPATIENT
Start: 2021-07-11 | End: 2021-07-11

## 2021-07-11 RX ORDER — AMLODIPINE BESYLATE 5 MG/1
5 TABLET ORAL DAILY
COMMUNITY

## 2021-07-11 RX ORDER — NITROFURANTOIN 25; 75 MG/1; MG/1
100 CAPSULE ORAL EVERY 12 HOURS SCHEDULED
Qty: 10 CAPSULE | Refills: 0 | Status: SHIPPED | OUTPATIENT
Start: 2021-07-11 | End: 2021-07-13 | Stop reason: ALTCHOICE

## 2021-07-11 RX ADMIN — NITROFURANTOIN (MONOHYDRATE/MACROCRYSTALS) 100 MG: 75; 25 CAPSULE ORAL at 18:58

## 2021-07-11 RX ADMIN — IOHEXOL 100 ML: 350 INJECTION, SOLUTION INTRAVENOUS at 17:56

## 2021-07-11 RX ADMIN — SODIUM CHLORIDE 1000 ML: 0.9 INJECTION, SOLUTION INTRAVENOUS at 17:13
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
## 2021-07-11 NOTE — DISCHARGE INSTRUCTIONS
Today your CT scan showed a normal-appearing appendix  I did not see any evidence of a kidney stone on your CT  You have been experiencing some urinary frequency and feel like you are pushing to urinate  Although I did not have see any bladder bacteria in your urine sample provided today I will treat you for possible cystitis/bladder infection        Urinary Tract Infection in Women   WHAT YOU NEED TO KNOW:   A urinary tract infection (UTI) is caused by bacteria that get inside your urinary tract  Most bacteria that enter your urinary tract come out when you urinate  If the bacteria stay in your urinary tract, you may get an infection  Your urinary tract includes your kidneys, ureters, bladder, and urethra  Urine is made in your kidneys, and it flows from the ureters to the bladder  Urine leaves the bladder through the urethra  A UTI is more common in your lower urinary tract, which includes your bladder and urethra  DISCHARGE INSTRUCTIONS:   Return to the emergency department if:   · You are urinating very little or not at all  · You have a high fever with shaking chills  · You have side or back pain that gets worse  Call your doctor if:   · You have a fever  · You do not feel better after 2 days of taking antibiotics  · You are vomiting  · You have questions or concerns about your condition or care  Medicines:   · Antibiotics  help fight a bacterial infection  If you have UTIs often (called recurrent UTIs), you may be given antibiotics to take regularly  You will be given directions for when and how to use antibiotics  The goal is to prevent UTIs but not cause antibiotic resistance by using antibiotics too often  · Medicines  may be given to decrease pain and burning when you urinate  They will also help decrease the feeling that you need to urinate often  These medicines will make your urine orange or red  · Take your medicine as directed    Contact your healthcare provider if you think your medicine is not helping or if you have side effects  Tell him or her if you are allergic to any medicine  Keep a list of the medicines, vitamins, and herbs you take  Include the amounts, and when and why you take them  Bring the list or the pill bottles to follow-up visits  Carry your medicine list with you in case of an emergency  Follow up with your healthcare provider as directed:  Write down your questions so you remember to ask them during your visits  Prevent another UTI:   · Empty your bladder often  Urinate and empty your bladder as soon as you feel the need  Do not hold your urine for long periods of time  · Wipe from front to back after you urinate or have a bowel movement  This will help prevent germs from getting into your urinary tract through your urethra  · Drink liquids as directed  Ask how much liquid to drink each day and which liquids are best for you  You may need to drink more liquids than usual to help flush out the bacteria  Do not drink alcohol, caffeine, or citrus juices  These can irritate your bladder and increase your symptoms  Your healthcare provider may recommend cranberry juice to help prevent a UTI  · Urinate after you have sex  This can help flush out bacteria passed during sex  · Do not douche or use feminine deodorants  These can change the chemical balance in your vagina  · Change sanitary pads or tampons often  This will help prevent germs from getting into your urinary tract  · Talk to your healthcare provider about your birth control method  You may need to change your method if it is increasing your risk for UTIs  · Wear cotton underwear and clothes that are loose  Tight pants and nylon underwear can trap moisture and cause bacteria to grow  · Vaginal estrogen may be recommended  This medicine helps prevent UTIs in women who have gone through menopause or are in bautista-menopause  · Do pelvic muscle exercises often    Pelvic muscle exercises may help you start and stop urinating  Strong pelvic muscles may help you empty your bladder easier  Squeeze these muscles tightly for 5 seconds like you are trying to hold back urine  Then relax for 5 seconds  Gradually work up to squeezing for 10 seconds  Do 3 sets of 15 repetitions a day, or as directed  © Copyright [address] Information is for End User's use only and may not be sold, redistributed or otherwise used for commercial purposes  All illustrations and images included in CareNotes® are the copyrighted property of A D A M , Inc  or [facility] Sarwat Hare   The above information is an  only  It is not intended as medical advice for individual conditions or treatments  Talk to your doctor, nurse or pharmacist before following any medical regimen to see if it is safe and effective for you

## 2021-07-12 LAB — BACTERIA UR CULT: NORMAL

## 2021-07-13 ENCOUNTER — OFFICE VISIT (OUTPATIENT)
Dept: INTERNAL MEDICINE CLINIC | Facility: CLINIC | Age: 71
End: 2021-07-13
Payer: MEDICARE

## 2021-07-13 VITALS
SYSTOLIC BLOOD PRESSURE: 120 MMHG | DIASTOLIC BLOOD PRESSURE: 64 MMHG | TEMPERATURE: 98.9 F | HEART RATE: 67 BPM | WEIGHT: 154.4 LBS | OXYGEN SATURATION: 99 % | BODY MASS INDEX: 28.41 KG/M2 | HEIGHT: 62 IN

## 2021-07-13 DIAGNOSIS — S39.011D STRAIN OF ABDOMINAL MUSCLE, SUBSEQUENT ENCOUNTER: ICD-10-CM

## 2021-07-13 DIAGNOSIS — I25.10 CORONARY ARTERY DISEASE INVOLVING NATIVE CORONARY ARTERY OF NATIVE HEART WITHOUT ANGINA PECTORIS: Primary | ICD-10-CM

## 2021-07-13 DIAGNOSIS — E04.1 THYROID NODULE: ICD-10-CM

## 2021-07-13 DIAGNOSIS — I10 ESSENTIAL HYPERTENSION: ICD-10-CM

## 2021-07-13 PROBLEM — S39.011A ABDOMINAL MUSCLE STRAIN: Status: ACTIVE | Noted: 2021-07-13

## 2021-07-13 PROCEDURE — 99214 OFFICE O/P EST MOD 30 MIN: CPT | Performed by: INTERNAL MEDICINE

## 2021-07-13 NOTE — ASSESSMENT & PLAN NOTE
Clinically this appears to be a mild lower abdominal muscle wall strain  There is reproducible tenderness of the anterior abdominal wall in the right lower quadrant  There is no hernia defect  We will continue to monitor for healing  Recommended diclofenac gel as needed

## 2021-07-13 NOTE — ASSESSMENT & PLAN NOTE
Nicely controlled on current medications  Diovan and Normodyne have been discontinued following her percutaneous intervention and stent placement    She is currently on Norvasc and carvedilol
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
## 2021-07-13 NOTE — ASSESSMENT & PLAN NOTE
Patient is clinically stable following drug-eluting stent placement to the LAD for 60 to 70% obstructive lesion of the mid LAD  Continues on Lipitor  Without any complaints of myalgias

## 2021-07-13 NOTE — LETTER
July 13, 2021     Patient: Amnada Bryson   YOB: 1950   Date of Visit: 7/13/2021       To Whom it May Concern:    Job Chary is under my professional care  She was seen in my office on 7/13/2021  She may return to work on July 19, 2021 and may return to work with limitations of avoiding heavy lifting of anything greater than 25 pounds for 2 weeks       If you have any questions or concerns, please don't hesitate to call           Sincerely,          Whitney Dobbins MD        CC: No Recipients

## 2021-08-17 ENCOUNTER — TELEPHONE (OUTPATIENT)
Dept: INTERNAL MEDICINE CLINIC | Facility: CLINIC | Age: 71
End: 2021-08-17

## 2021-08-17 ENCOUNTER — TELEMEDICINE (OUTPATIENT)
Dept: INTERNAL MEDICINE CLINIC | Facility: CLINIC | Age: 71
End: 2021-08-17
Payer: MEDICARE

## 2021-08-17 VITALS — TEMPERATURE: 99.5 F

## 2021-08-17 DIAGNOSIS — Z12.31 ENCOUNTER FOR SCREENING MAMMOGRAM FOR MALIGNANT NEOPLASM OF BREAST: Primary | ICD-10-CM

## 2021-08-17 DIAGNOSIS — B34.9 VIRAL INFECTION, UNSPECIFIED: ICD-10-CM

## 2021-08-17 PROCEDURE — U0005 INFEC AGEN DETEC AMPLI PROBE: HCPCS | Performed by: NURSE PRACTITIONER

## 2021-08-17 PROCEDURE — U0003 INFECTIOUS AGENT DETECTION BY NUCLEIC ACID (DNA OR RNA); SEVERE ACUTE RESPIRATORY SYNDROME CORONAVIRUS 2 (SARS-COV-2) (CORONAVIRUS DISEASE [COVID-19]), AMPLIFIED PROBE TECHNIQUE, MAKING USE OF HIGH THROUGHPUT TECHNOLOGIES AS DESCRIBED BY CMS-2020-01-R: HCPCS | Performed by: NURSE PRACTITIONER

## 2021-08-17 PROCEDURE — 99213 OFFICE O/P EST LOW 20 MIN: CPT | Performed by: NURSE PRACTITIONER
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
## 2021-08-17 NOTE — TELEPHONE ENCOUNTER
Patient had a telehealth visit earlier and was currently waiting outside to get tested for COVID  She wanted to relay that her throat was very sore and wanted to know if there was something that could be prescribed for that  Please advise

## 2021-08-17 NOTE — PATIENT INSTRUCTIONS

## 2021-08-19 ENCOUNTER — OFFICE VISIT (OUTPATIENT)
Dept: INTERNAL MEDICINE CLINIC | Facility: CLINIC | Age: 71
End: 2021-08-19
Payer: MEDICARE

## 2021-08-19 VITALS
DIASTOLIC BLOOD PRESSURE: 60 MMHG | SYSTOLIC BLOOD PRESSURE: 100 MMHG | WEIGHT: 156 LBS | OXYGEN SATURATION: 98 % | HEART RATE: 65 BPM | BODY MASS INDEX: 28.89 KG/M2 | TEMPERATURE: 97.8 F

## 2021-08-19 DIAGNOSIS — R21 RASH AND NONSPECIFIC SKIN ERUPTION: Primary | ICD-10-CM

## 2021-08-19 PROCEDURE — 99213 OFFICE O/P EST LOW 20 MIN: CPT | Performed by: INTERNAL MEDICINE

## 2021-08-19 RX ORDER — METHYLPREDNISOLONE 4 MG/1
TABLET ORAL
Qty: 21 EACH | Refills: 0 | Status: SHIPPED | OUTPATIENT
Start: 2021-08-19 | End: 2022-03-03 | Stop reason: ALTCHOICE

## 2021-08-19 RX ORDER — BETAMETHASONE DIPROPIONATE 0.5 MG/G
CREAM TOPICAL
COMMUNITY
End: 2022-03-03

## 2021-08-19 NOTE — ASSESSMENT & PLAN NOTE
3-4 days of worsening rash on palms and soles; patient did pull weeds and walk barefoot; woods near house; +F/C, sore throat but feeling much better    Contact dermatitis vs hand foot mouth vs syphyllis (less likely)    Hydrocortisone cream and supportive care for itch  Medrol dose pack  betamethazone and econazole as per podiatry  If no improvement in one week, consider RPR and referral to derm

## 2021-08-19 NOTE — ADDENDUM NOTE
Addended by: Marguerite Resendez on: 8/19/2021 05:56 PM     Modules accepted: Level of Service Duplicate Pt sent Mychart. Bia Draper RN
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
## 2021-10-05 ENCOUNTER — TELEPHONE (OUTPATIENT)
Dept: INTERNAL MEDICINE CLINIC | Age: 71
End: 2021-10-05

## 2021-10-07 DIAGNOSIS — A69.20 LYME DISEASE: ICD-10-CM

## 2021-10-07 DIAGNOSIS — M25.50 PAIN IN JOINT, MULTIPLE SITES: Primary | ICD-10-CM

## 2021-12-08 ENCOUNTER — HOSPITAL ENCOUNTER (OUTPATIENT)
Dept: RADIOLOGY | Age: 71
Discharge: HOME | End: 2021-12-08
Payer: MEDICARE

## 2021-12-08 VITALS — HEIGHT: 61 IN | WEIGHT: 154 LBS | BODY MASS INDEX: 29.07 KG/M2

## 2021-12-08 DIAGNOSIS — Z12.31 ENCOUNTER FOR SCREENING MAMMOGRAM FOR MALIGNANT NEOPLASM OF BREAST: ICD-10-CM

## 2021-12-08 PROCEDURE — 77063 BREAST TOMOSYNTHESIS BI: CPT

## 2021-12-08 PROCEDURE — 77067 SCR MAMMO BI INCL CAD: CPT

## 2022-01-25 ENCOUNTER — RA CDI HCC (OUTPATIENT)
Dept: OTHER | Facility: HOSPITAL | Age: 72
End: 2022-01-25

## 2022-03-03 ENCOUNTER — OFFICE VISIT (OUTPATIENT)
Dept: INTERNAL MEDICINE CLINIC | Facility: CLINIC | Age: 72
End: 2022-03-03
Payer: COMMERCIAL

## 2022-03-03 VITALS
HEIGHT: 62 IN | HEART RATE: 73 BPM | WEIGHT: 158.6 LBS | TEMPERATURE: 98.8 F | BODY MASS INDEX: 29.19 KG/M2 | SYSTOLIC BLOOD PRESSURE: 126 MMHG | OXYGEN SATURATION: 98 % | DIASTOLIC BLOOD PRESSURE: 70 MMHG

## 2022-03-03 DIAGNOSIS — E78.00 PURE HYPERCHOLESTEROLEMIA: ICD-10-CM

## 2022-03-03 DIAGNOSIS — I25.10 CORONARY ARTERY DISEASE INVOLVING NATIVE CORONARY ARTERY OF NATIVE HEART WITHOUT ANGINA PECTORIS: ICD-10-CM

## 2022-03-03 DIAGNOSIS — Z00.00 MEDICARE ANNUAL WELLNESS VISIT, SUBSEQUENT: ICD-10-CM

## 2022-03-03 DIAGNOSIS — Z13.820 ENCOUNTER FOR OSTEOPOROSIS SCREENING IN ASYMPTOMATIC POSTMENOPAUSAL PATIENT: Primary | ICD-10-CM

## 2022-03-03 DIAGNOSIS — I47.1 SVT (SUPRAVENTRICULAR TACHYCARDIA) (HCC): ICD-10-CM

## 2022-03-03 DIAGNOSIS — Z78.0 ENCOUNTER FOR OSTEOPOROSIS SCREENING IN ASYMPTOMATIC POSTMENOPAUSAL PATIENT: Primary | ICD-10-CM

## 2022-03-03 PROBLEM — M19.019 ARTHRITIS OF SHOULDER: Status: ACTIVE | Noted: 2021-07-06

## 2022-03-03 PROBLEM — S46.009A INJURY OF TENDON OF ROTATOR CUFF: Status: ACTIVE | Noted: 2021-07-06

## 2022-03-03 PROCEDURE — 3725F SCREEN DEPRESSION PERFORMED: CPT | Performed by: INTERNAL MEDICINE

## 2022-03-03 PROCEDURE — 1125F AMNT PAIN NOTED PAIN PRSNT: CPT | Performed by: INTERNAL MEDICINE

## 2022-03-03 PROCEDURE — 3288F FALL RISK ASSESSMENT DOCD: CPT | Performed by: INTERNAL MEDICINE

## 2022-03-03 PROCEDURE — 3008F BODY MASS INDEX DOCD: CPT | Performed by: INTERNAL MEDICINE

## 2022-03-03 PROCEDURE — 1170F FXNL STATUS ASSESSED: CPT | Performed by: INTERNAL MEDICINE

## 2022-03-03 PROCEDURE — 1036F TOBACCO NON-USER: CPT | Performed by: INTERNAL MEDICINE

## 2022-03-03 PROCEDURE — G0439 PPPS, SUBSEQ VISIT: HCPCS | Performed by: INTERNAL MEDICINE

## 2022-03-03 PROCEDURE — 1090F PRES/ABSN URINE INCON ASSESS: CPT | Performed by: INTERNAL MEDICINE

## 2022-03-03 PROCEDURE — 99213 OFFICE O/P EST LOW 20 MIN: CPT | Performed by: INTERNAL MEDICINE

## 2022-03-03 PROCEDURE — 1160F RVW MEDS BY RX/DR IN RCRD: CPT | Performed by: INTERNAL MEDICINE

## 2022-03-03 RX ORDER — CLOPIDOGREL BISULFATE 75 MG/1
75 TABLET ORAL DAILY
COMMUNITY
Start: 2022-02-16
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
## 2022-03-03 NOTE — ASSESSMENT & PLAN NOTE
Mild elevation of serum CK levels  Patient denies any muscle aches or myalgias    Recommended that she can initiate supplements of coenzyme Q10 300 mg daily

## 2022-03-17 PROBLEM — K64.5 THROMBOSED EXTERNAL HEMORRHOID: Status: ACTIVE | Noted: 2022-03-17

## 2022-04-05 ENCOUNTER — NURSE TRIAGE (OUTPATIENT)
Dept: OTHER | Facility: OTHER | Age: 72
End: 2022-04-05

## 2022-04-05 ENCOUNTER — TELEMEDICINE (OUTPATIENT)
Dept: INTERNAL MEDICINE CLINIC | Facility: CLINIC | Age: 72
End: 2022-04-05
Payer: COMMERCIAL

## 2022-04-05 DIAGNOSIS — U07.1 COVID: ICD-10-CM

## 2022-04-05 PROBLEM — M25.519 SHOULDER PAIN: Status: ACTIVE | Noted: 2022-04-05

## 2022-04-05 PROCEDURE — G2012 BRIEF CHECK IN BY MD/QHP: HCPCS

## 2022-04-05 NOTE — LETTER
[facility] [place] [address]  [address]  [place] [id number]  Dept: [phone]    April 5, 2022    Patient: Vincenzo Brennan  YOB: 1950     Vincenzo Brennan was seen and evaluated via telemedicine at our [facility]  Patient states that she tested positive for COVID today 04/05/2022  She may return to work 7 days from the onset of symptoms 04/12/2022  Upon return, they must then adhere to strict masking for an additional 5 days      Sincerely,    Beti Gee, DO

## 2022-04-05 NOTE — ASSESSMENT & PLAN NOTE
States that she tested positive for COVID at home today 04/05/2022  Has generalized congestion, cough, body aches, chills  Denies any shortness of breath, chest pain, chest tightness  Advised patient to take molnupiravir 5 days

## 2022-04-05 NOTE — TELEPHONE ENCOUNTER
Patient called stating she was supposed to be prescribed lidocaine patch today but it was not at the pharmacy and she picked up methylprednisolone instead  I let patient know that molnupiravir was sent to pharmacy today from PCP office for Vince Childress  Patient realized methylprednisolone was from cardiologist  I let patient know she can get lidocaine patches over the counter  Patient wonders if the patch can still be sent in for her  Please follow up

## 2022-04-05 NOTE — TELEPHONE ENCOUNTER
Regarding: Wrong medication was called in   ----- Message from Alton Owens sent at 4/5/2022  7:27 PM EDT -----  '' My doctor had told me that he was going call in patches for me to put on my shoulder at the pharmacy and what I got were pills  ''

## 2022-04-05 NOTE — ASSESSMENT & PLAN NOTE
Complaining of chronic bilateral shoulder pain has been getting worse  She states that she has had steroid injections in the past, with last 1 in January that has helped    Advised patient to use Tylenol for pain and follow-up with schedule appointment with Orthopedic surgery

## 2022-04-05 NOTE — PROGRESS NOTES
Assessment/Plan:    Shoulder pain  Complaining of chronic bilateral shoulder pain has been getting worse  She states that she has had steroid injections in the past, with last 1 in January that has helped  Advised patient to use Tylenol for pain and follow-up with schedule appointment with Orthopedic surgery    COVID  States that she tested positive for COVID at home today 04/05/2022  Has generalized congestion, cough, body aches, chills  Denies any shortness of breath, chest pain, chest tightness  Advised patient to take molnupiravir 5 days       Diagnoses and all orders for this visit:    COVID  -     molnupiravir 200 mg capsule; Take 4 capsules (800 mg total) by mouth every 12 (twelve) hours for 5 days          Subjective:      Patient ID: Tamie Barnett is a 66 y o  female  HPI    Patient evaluated via telemedicine for complaints of chronic bilateral shoulder pain that has been getting worse  She follows with hepatic surgery outpatient with last steroid injection January  She states that the steroid injections have helped in the past she has an appointment scheduled Orthopedic surgery the next few weeks  Patient was originally scheduled to be seen the office, however she does have positive for COVID today  Complaining of congestion, nonproductive cough, fatigue, muscle aches  Due to patient's age and comorbidities, patient was advised to take molnupiravir for 5 days  Patient was asked to take Tylenol for shoulder pain and follow-up with schedule orthopedic surgery appointment  Denies chest pain, palpitation, pain with inspiration, LOC, nausea/vomiting/diarrhea, dysuria, hematochezia, trauma, fall      The following portions of the patient's history were reviewed and updated as appropriate: allergies, past family history, past medical history, past social history, past surgical history and problem list     Review of Systems   Constitutional: Positive for fatigue  Negative for chills and fever     HENT: Positive for congestion and rhinorrhea  Negative for hearing loss and sore throat  Eyes: Negative for pain and visual disturbance  Respiratory: Positive for cough  Negative for chest tightness and shortness of breath  Cardiovascular: Negative for chest pain and leg swelling  Gastrointestinal: Negative for abdominal pain and constipation  Genitourinary: Negative for dysuria and hematuria  Musculoskeletal: Positive for arthralgias (Bilateral shoulder pain)  Negative for back pain and neck pain  Neurological: Negative for dizziness and headaches  Psychiatric/Behavioral: Negative for confusion and hallucinations  All other systems reviewed and are negative  Objective: There were no vitals taken for this visit           Physical Exam  telemedicine visit

## 2022-04-05 NOTE — TELEPHONE ENCOUNTER
Reason for Disposition   [1] Caller has NON-URGENT medicine question about med that PCP prescribed AND [2] triager unable to answer question    Answer Assessment - Initial Assessment Questions  1  NAME of MEDICATION: "What medicine are you calling about?"      Lidocaine patch     2  QUESTION: "What is your question?" (e g , medication refill, side effect)       went to  prescription and noticed it is just tablets     3  PRESCRIBING HCP: "Who prescribed it?" Reason: if prescribed by specialist, call should be referred to that group        Dr Nisha Chris with PCP office    Protocols used: MEDICATION QUESTION CALL-ADULT-

## 2022-04-08 ENCOUNTER — TELEPHONE (OUTPATIENT)
Dept: INTERNAL MEDICINE CLINIC | Facility: CLINIC | Age: 72
End: 2022-04-08

## 2022-04-08 NOTE — TELEPHONE ENCOUNTER
Per uptodate there is no drug interaction between the two    I do not see where cardiology ordered him methylprednisolone though and it is not on his active med list

## 2022-04-08 NOTE — TELEPHONE ENCOUNTER
Pt was prescribed methylprednisolone from cardiologist and would like to know if there is any interaction with that Rx and molnupiravir 200 mg capsule, that was prescribed by Dr Cain for Covid Symptoms  Please advise

## 2022-04-13 ENCOUNTER — TELEMEDICINE (OUTPATIENT)
Dept: INTERNAL MEDICINE CLINIC | Facility: CLINIC | Age: 72
End: 2022-04-13
Payer: COMMERCIAL

## 2022-04-13 VITALS
HEIGHT: 62 IN | SYSTOLIC BLOOD PRESSURE: 96 MMHG | WEIGHT: 153.8 LBS | OXYGEN SATURATION: 97 % | DIASTOLIC BLOOD PRESSURE: 62 MMHG | HEART RATE: 66 BPM | BODY MASS INDEX: 28.3 KG/M2 | TEMPERATURE: 97.6 F

## 2022-04-13 DIAGNOSIS — M19.019 ARTHRITIS OF SHOULDER: ICD-10-CM

## 2022-04-13 DIAGNOSIS — I10 ESSENTIAL HYPERTENSION: ICD-10-CM

## 2022-04-13 DIAGNOSIS — R06.02 SHORTNESS OF BREATH: ICD-10-CM

## 2022-04-13 DIAGNOSIS — E78.00 PURE HYPERCHOLESTEROLEMIA: ICD-10-CM

## 2022-04-13 DIAGNOSIS — U07.1 COVID: Primary | ICD-10-CM

## 2022-04-13 PROBLEM — M25.519 SHOULDER PAIN: Status: RESOLVED | Noted: 2022-04-05 | Resolved: 2022-04-13

## 2022-04-13 PROCEDURE — 93000 ELECTROCARDIOGRAM COMPLETE: CPT

## 2022-04-13 PROCEDURE — 99214 OFFICE O/P EST MOD 30 MIN: CPT

## 2022-04-13 RX ORDER — AZITHROMYCIN 500 MG/1
TABLET, FILM COATED ORAL
COMMUNITY
Start: 2022-04-05 | End: 2022-04-21 | Stop reason: ALTCHOICE

## 2022-04-13 RX ORDER — METHYLPREDNISOLONE 4 MG/1
TABLET ORAL
COMMUNITY
Start: 2022-04-05 | End: 2022-04-21 | Stop reason: ALTCHOICE

## 2022-04-13 RX ORDER — GUAIFENESIN/DEXTROMETHORPHAN 100-10MG/5
5 SYRUP ORAL 3 TIMES DAILY PRN
Qty: 237 ML | Refills: 0 | Status: SHIPPED | OUTPATIENT
Start: 2022-04-13 | End: 2022-04-21 | Stop reason: ALTCHOICE

## 2022-04-13 NOTE — ASSESSMENT & PLAN NOTE
History of chronic bilateral arthritic shoulder pain  She states the pain has been improved since she has been taking but the prednisone which is prescribed by her cardiologist which has been helping her pain    Follow-up with orthopedic surgery scheduled for next week

## 2022-04-13 NOTE — PATIENT INSTRUCTIONS
Please stop taking amlodipine 5 mg daily  You may continue your Coreg 12 5 mg daily    Please follow-up with the office in 1 week to recheck her blood pressure

## 2022-04-13 NOTE — PROGRESS NOTES
Assessment/Plan:    COVID  · Tested positive for home COVID test x2 on 04/05/2022  · Complaining of worsening SOB, generalized fatigue, productive cough, and congestion  · Patient has taken molnupiravir for 5 days complete yesterday  · Patient was also prescribed by cardiologist tawana for 5 days and methylprednisone taper for 6 days completed 2 days ago  · Not a smoker    BP 96/62 (BP Location: Left arm, Patient Position: Sitting, Cuff Size: Standard)   Pulse 66   Temp 97 6 °F (36 4 °C) (Tympanic)   Ht 5' 1 81" (1 57 m)   Wt 69 8 kg (153 lb 12 8 oz)   SpO2 97%   BMI 28 30 kg/m²       Plan:  Follow up CXR ordered  COVID/Influenza test ordered      Arthritis of shoulder  History of chronic bilateral arthritic shoulder pain  She states the pain has been improved since she has been taking but the prednisone which is prescribed by her cardiologist which has been helping her pain  Follow-up with orthopedic surgery scheduled for next week    Hyperlipidemia  Continue current statin therapy and continue follow with Cardiology outpatient    Essential hypertension  Blood Pressure: 96/62    Hold amlodipine 5 mg daily  Continue Coreg 12 5 mg daily  Follow-up in the office in 1 week to recheck blood pressure       Diagnoses and all orders for this visit:    COVID    Arthritis of shoulder    Essential hypertension    Pure hypercholesterolemia    Other orders  -     azithromycin (ZITHROMAX) 500 MG tablet; TAKE ONE TABLET ONCE DAILY FOR FIVE DAYS (Patient not taking: Reported on 4/13/2022)  -     methylPREDNISolone 4 MG tablet therapy pack; FOLLOW PACKAGE INSTRUCTIONS (Patient not taking: Reported on 4/13/2022)          Subjective:      Patient ID: Bianca Doll is a 66 y o  female  HPI    Patient present to the office for follow-up visit in the clinic after a tele visit last week which tested positive for COVID with a home test x2   With her chronic high risk medical conditions patient was prescribed molnupiravir 5 days which she completed yesterday  She was also prescribed Z-Derrick and methylprednisone by cardiologist which she completed 2 days ago  At today's visit, she complains of worsening SOB, fatigue, productive cough with white phlem, congestion  Denies fever/chills, CP, pleuritc pain, palpitation, lightheadedness, abdominal pain, dysuria, hematuria, hemoptysis, N/V/D  The following portions of the patient's history were reviewed and updated as appropriate: past family history, past medical history, past social history and past surgical history  Review of Systems   Constitutional: Positive for fatigue  Negative for chills and fever  HENT: Positive for congestion  Negative for hearing loss and sore throat  Eyes: Negative for pain and visual disturbance  Respiratory: Positive for cough, chest tightness and shortness of breath  Cardiovascular: Negative for chest pain and leg swelling  Gastrointestinal: Negative for abdominal pain and constipation  Genitourinary: Negative for dysuria and hematuria  Musculoskeletal: Negative for back pain and neck pain  Neurological: Negative for dizziness and headaches  Psychiatric/Behavioral: Negative for confusion and hallucinations  All other systems reviewed and are negative  Objective:      BP 96/62 (BP Location: Left arm, Patient Position: Sitting, Cuff Size: Standard)   Pulse 66   Temp 97 6 °F (36 4 °C) (Tympanic)   Ht 5' 1 81" (1 57 m)   Wt 69 8 kg (153 lb 12 8 oz)   SpO2 97%   BMI 28 30 kg/m²     EKG: normal EKG, normal sinus rhythm, unchanged from previous tracings  Physical Exam  Vitals and nursing note reviewed  Constitutional:       General: She is not in acute distress  Appearance: She is not ill-appearing, toxic-appearing or diaphoretic  HENT:      Head: Normocephalic and atraumatic  Cardiovascular:      Rate and Rhythm: Normal rate and regular rhythm  Pulses: Normal pulses  Heart sounds: Normal heart sounds   No murmur heard  No friction rub  No gallop  Pulmonary:      Effort: Pulmonary effort is normal  No respiratory distress  Breath sounds: Normal breath sounds  No rhonchi or rales  Abdominal:      General: Abdomen is flat  Palpations: Abdomen is soft  Musculoskeletal:      Cervical back: Normal range of motion and neck supple  No rigidity or tenderness  Skin:     General: Skin is warm and dry  Coloration: Skin is not jaundiced or pale  Neurological:      Mental Status: She is alert

## 2022-04-13 NOTE — ASSESSMENT & PLAN NOTE
· Tested positive for home COVID test x2 on 04/05/2022  · Complaining of worsening SOB, generalized fatigue, productive cough, and congestion  · Patient has taken molnupiravir for 5 days complete yesterday  · Patient was also prescribed by cardiologist tawana for 5 days and methylprednisone taper for 6 days completed 2 days ago    · Not a smoker    BP 96/62 (BP Location: Left arm, Patient Position: Sitting, Cuff Size: Standard)   Pulse 66   Temp 97 6 °F (36 4 °C) (Tympanic)   Ht 5' 1 81" (1 57 m)   Wt 69 8 kg (153 lb 12 8 oz)   SpO2 97%   BMI 28 30 kg/m²       Plan:  Follow up CXR ordered  COVID/Influenza test ordered

## 2022-04-15 ENCOUNTER — HOSPITAL ENCOUNTER (OUTPATIENT)
Dept: RADIOLOGY | Facility: HOSPITAL | Age: 72
Discharge: HOME | End: 2022-04-15
Payer: COMMERCIAL

## 2022-04-15 DIAGNOSIS — U07.1 COVID: ICD-10-CM

## 2022-04-15 PROCEDURE — 71046 X-RAY EXAM CHEST 2 VIEWS: CPT

## 2022-04-21 ENCOUNTER — OFFICE VISIT (OUTPATIENT)
Dept: INTERNAL MEDICINE CLINIC | Facility: CLINIC | Age: 72
End: 2022-04-21
Payer: COMMERCIAL

## 2022-04-21 VITALS
HEART RATE: 69 BPM | WEIGHT: 159.2 LBS | SYSTOLIC BLOOD PRESSURE: 122 MMHG | DIASTOLIC BLOOD PRESSURE: 62 MMHG | OXYGEN SATURATION: 98 % | BODY MASS INDEX: 29.3 KG/M2 | TEMPERATURE: 97.2 F | HEIGHT: 62 IN

## 2022-04-21 DIAGNOSIS — I10 ESSENTIAL HYPERTENSION: ICD-10-CM

## 2022-04-21 DIAGNOSIS — R30.0 DYSURIA: ICD-10-CM

## 2022-04-21 DIAGNOSIS — U07.1 COVID: Primary | ICD-10-CM

## 2022-04-21 LAB
SL AMB  POCT GLUCOSE, UA: NEGATIVE
SL AMB LEUKOCYTE ESTERASE,UA: NEGATIVE
SL AMB POCT BILIRUBIN,UA: NEGATIVE
SL AMB POCT BLOOD,UA: NEGATIVE
SL AMB POCT CLARITY,UA: CLEAR
SL AMB POCT COLOR,UA: YELLOW
SL AMB POCT KETONES,UA: NEGATIVE
SL AMB POCT NITRITE,UA: NEGATIVE
SL AMB POCT PH,UA: 5.5
SL AMB POCT SPECIFIC GRAVITY,UA: 1.01
SL AMB POCT URINE PROTEIN: NEGATIVE
SL AMB POCT UROBILINOGEN: 0.2

## 2022-04-21 PROCEDURE — 3078F DIAST BP <80 MM HG: CPT | Performed by: INTERNAL MEDICINE

## 2022-04-21 PROCEDURE — 3008F BODY MASS INDEX DOCD: CPT | Performed by: INTERNAL MEDICINE

## 2022-04-21 PROCEDURE — 1036F TOBACCO NON-USER: CPT | Performed by: INTERNAL MEDICINE

## 2022-04-21 PROCEDURE — 81003 URINALYSIS AUTO W/O SCOPE: CPT | Performed by: INTERNAL MEDICINE

## 2022-04-21 PROCEDURE — 3074F SYST BP LT 130 MM HG: CPT | Performed by: INTERNAL MEDICINE

## 2022-04-21 PROCEDURE — 1160F RVW MEDS BY RX/DR IN RCRD: CPT | Performed by: INTERNAL MEDICINE

## 2022-04-21 PROCEDURE — 99213 OFFICE O/P EST LOW 20 MIN: CPT | Performed by: INTERNAL MEDICINE
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
## 2022-04-21 NOTE — LETTER
[facility] [address] [id number]  Dept: [phone]    April 21, 2022    Patient: Toma Baeza  YOB: 1950    Toma Baeza was seen and evaluated at our [facility]  Please note if Covid and Flu tests are negative, they may return to work when fever free for 24 hours without the use of a fever reducing agent  If Covid or Flu test is positive, they may return to work on April 25, as this is more than 10 days from the onset of symptoms  Upon return to work no masking is required since symptoms onset was greater than 14 days ago and patient is fully vaccinated and boosted        Sincerely,        Maribel Weeks MD

## 2022-04-28 ENCOUNTER — EVALUATION (OUTPATIENT)
Dept: PHYSICAL THERAPY | Facility: CLINIC | Age: 72
End: 2022-04-28
Payer: COMMERCIAL

## 2022-04-28 DIAGNOSIS — M25.511 CHRONIC RIGHT SHOULDER PAIN: ICD-10-CM

## 2022-04-28 DIAGNOSIS — M25.512 CHRONIC LEFT SHOULDER PAIN: Primary | ICD-10-CM

## 2022-04-28 DIAGNOSIS — G89.29 CHRONIC RIGHT SHOULDER PAIN: ICD-10-CM

## 2022-04-28 DIAGNOSIS — G89.29 CHRONIC LEFT SHOULDER PAIN: Primary | ICD-10-CM

## 2022-04-28 NOTE — LETTER
2022      No Recipients    Patient: Rach Davis   YOB: 1950   Date of Visit: 2022     Encounter Diagnosis     ICD-10-CM    1  Chronic left shoulder pain  M25 512     G89 29    2  Chronic right shoulder pain  M25 511     G89 29        Dear Dr Becky Lara Recipients: Thank you for your recent referral of Rach Davis  Please review the attached evaluation summary from Mirna's recent visit  Please verify that you agree with the plan of care by signing the attached order  If you have any questions or concerns, please do not hesitate to call  I sincerely appreciate the opportunity to share in the care of one of your patients and hope to have another opportunity to work with you in the near future  Sincerely,    Elizabeth Palacios, PT      Referring Provider:      I certify that I have read the below Plan of Care and certify the need for these services furnished under this plan of treatment while under my care  No Recipients          PT Evaluation     Today's date: 2022  Patient name: Rach Davis  : 1950  MRN: [id number]  Referring provider: No ref  provider found  Dx: No diagnosis found  Assessment  Assessment details:   CASE SUMMARY:   Rach Davis is a 66y o  year old female who presents to OPPT upon referral from ortho  secondary to c/o erica shoulder pain    history of right shoulder pain since  intermittently and has progressively worsened  Received an injection in right shoulder on ~ 1 5 weeks ago  On left shoulder has a history of left shoulder pain since summer no specific incident initiating pain but then slipped on wet floor ~ 2 months ago and fell on left shoulder  Had 3 injections in left shoulder since summer    Current symptom location includes erica  shoulder pain in area of lateral brachium, posterior shoulder and under arm   and patient describes  current symptoms as: sharp, mostly achy    Symptoms are : intermittent  Pain level:  Current: 7/10  At Best: 3/10   At worst: 10/10 which occurs with certain movements or when she lies on shoudler at night  and with ADL's 7-8/10  Symptoms improve with: resting arms by her side, and worsen with getting pulled by the dogs during a walk, reaching overhead  Overall symptom progression at this time is worsening  Previous injury to this area: chronic  Previous treatment includes: as stated above, no PT  Diagnostic testing includes: xray asael: [employer] arthritis   PMHx includes: See chart for full details with medications, allergies, past family history, past medical history, social history, past surgical history and problem list  All topics were reviewed  Functionally, at baseline, Abelardo Carrera is independent with all basic ADL's and  advanced ADL's  Currently, Abelardo Carrera is limited in the following activities: reaching overhead, lifting with either UE  Abelardo Carrera is lives with  and 6 dogs   Recreational activities include: swimming, skiing, play softball   Beulah Mode is employed as a part time @ [address] [employer], [employer]  Patient goal(s) for physical therapy is: to reduce pain       The following is a summary of Abelardo Carrera objective status:    Impairments:   Postural dysfunction  Reduced ROM  Reduced strength  + TTP and increased soft tissue density  Reduced flexibility  Reduced stability  Transfers impairments  Reduced activity tolerance including above stated functional limitations    Patient's clinical presentation is consistent with their referring diagnosis of: asael shoudler impingement/pain  Patient presents with increased pain asael shoudler left greater then right with reduced ROm and strength  Patient states ortho discussed possible TSR but she is uninterested in surgery at this time  She will benefit form PT to maximize use of Asael UE and reduce shoudler pain  PLOC was discussed and agreed upon with patient  Patient was educated on: HEP and [address]        Patient vocalized a good understanding of  PLOC and HEP issued  [place] would benefit from skilled physical therapy services to address their aforementioned functional limitations and progress towards prior level of function, to meet stated goals,  and independence with home exercise program   Prognosis for successful rehab outcome is fair with consistent participation in therapy and carryover of HEP and PLOC  Functional limitations: see subjectiveUnderstanding of Dx/Px/POC: good   Prognosis: good    Goals  STG   + Patient will have pain level 0/10 shoulder  at rest (2-3 weeks)  + Patient will report a 35% improvement in symptoms (2-3 weeks)   + Patient will be independent in basic HEP (2-3 weeks)  + Patient will demonstrate an increase in range of motion shoulder  Left PROM in all planes  to  within normal limits (2-3 weeks)  + Patient will demonstrate appropriate scapulohumeral rhythm with reaching shoudler height (2-3 weeks)  + Patient will report increased ease reaching due to a reduction in pain (2-3 weeks)    LTG  + Patient will have pain level 2/10 shoulder  with ADL's (4-6 weeks)  + Patient will report a 60% improvement in symptoms (4-6 weeks)   + Patient will increase FOTO score by 10 points (8 sessions)   + Patient will be independent in comprehensive HEP (4-6 weeks)  + Patient will demonstrate an increase in range of motion shoulder AROM in all planes  to  within normal limits  (4-6 weeks)  + Patient will demonstrate increase  MMT of shoulder to  4/5  for maximum function   (4-6 weeks)  + Patient will demonstrate functional reaching without compensatory patterns in all three motions  (4-6 weeks)      Plan  Plan details: 1-3 x week, 4- 6 weeks: HEP development, stretching postural muscles and upper quadrant musculature, strengthening scap and shoulder musculature, A/AA/PROM prn, joint mobilizations ST/GH/Tspine/Rib/cervical spine prn per objective findings, posture education, STM/MI as needed to reduce muscle tension per objective findings, muscle reeducation per objective findings, scap retraining Donaldo mancia discussed and agreed upon with patient       Patient would benefit from: PT eval and skilled physical therapy  Planned modality interventions: cryotherapy and thermotherapy: hydrocollator packs  Planned therapy interventions: abdominal trunk stabilization, joint mobilization, activity modification, ADL training, neuromuscular re-education, body mechanics training, patient education, postural training, strengthening, stretching, therapeutic activities, therapeutic exercise, flexibility, functional ROM exercises, graded exercise and home exercise program  Frequency: 2x week  Plan of Care beginning date: 4/28/2022  Plan of Care expiration date: 6/9/2022  Treatment plan discussed with: patient        Subjective    Objective     Static Posture     Comments  Shoulder:  Posture:  Sitting: grossly wnl      Cervical ROM:  Flexion:wnl    Extension:moderate  + pain   Rotation right:minimal   Rotation left:moderate   SB right:moderate   SB left: moderate     Palpation:+ TTP in the following muscles: sub scap, serratus anterior on right, left pec minor, teres complex     Functional reaching: (tested in standing)  Overhead: Right: reduced ~ 20 %, painful Left: limited by 20%, painful   Behind head: Right: WFL, painfree Left: WFL, painful  Behind back :  Right: WFL, painful  Left: limited by 15%, painful     Scapular Mechanics: postures in protracted postion: increased scap protraction and reduced upward rotation  MMT:  Flexion (standing): Right: 4-/5  Left: 3+/5   Abduction (standing): Right: 4-/5    Left: 3+/5    ER: Right: 4-/5   Left: 3+/5   IR: Right: 4/5    Left: 4/5   Adduction: Right: 4/5    Left: 4/5   Extension: Right: 4/5    Left: 4/5       ROM:  Flexion: Right: 172  + pain at end range  Left: 155 + pain    Abduction: Right: wnl    Left: grossly wnl    Er (supine 45 deg abd): Right: wnl    Left: grossly wnl    IR (supine at 45 deg abd) : Right: wnl  Left: grossly wnl      Special tests: + flexion test on erica , sears rory: + pain erica left greater then right  Drop arm Right: neg, left neg  Supraspinatus: Right: + pain 4-/5, left + pain, 3-/5   Thoracic dysfunction: prn             Eval/ Re-eval Auth #/ Referral # Total visits Start date  Expiration date Total active units  Total manual units  PT only or  PT+OT? Date:           Total authorized units:  Active:            Manual:           Total remaining units:  Active:               Manual:                Date:           Total authorized units: Active:            Manual:           Total remaining units:  Active:               Manual:                    Precautions ***    Specialty Daily Treatment Diary       Insurance:        Visits: 1       Manual: 4/29/22       PROM shoulder        GHJ mobs        Scap mobs        STM/MI prn                        Ther ex:         Protocol:        UBE/Nustep        Pulleys        Scap Isometrics instruct       AAROM: flexion instruct       Tubing rows        Tubing ext                        Neuro florentin:         Scap resetting: P/AA/AROM        Upward scap rotation retraining P/AA/AROM        Dyn stab: flexion @120        Dyn stab: IR/ER @ neutral                Therapeutic Activity:         Reevaluation                HEP:                Modalities        MH        Ice        Total time:                 Access Code: [email or web address]  URL: [email or web address]  Date: 04/28/2022  Prepared by:  Eduin Simons    Exercises  · Supine Shoulder Flexion Extension AAROM with Dowel - 1 x daily - 7 x weekly - 1 sets - 10 reps  · Seated Scapular Retraction - 1 x daily - 7 x weekly - 1 sets - 10 reps - 5 sec hold

## 2022-04-28 NOTE — PROGRESS NOTES
PT Evaluation     Today's date: 22  Patient name: Eber Fregoso  :   MRN: [id number]  Referring provider: No ref  provider found  Dx:   Encounter Diagnosis     ICD-10-CM    1  Chronic left shoulder pain  M25 512     G89 29    2  Chronic right shoulder pain  M25 511     G89 29                   Assessment  Assessment details:   CASE SUMMARY:   Eber Fregoso is a 66y o  year old female who presents to OPPT upon referral from ortho  secondary to c/o erica shoulder pain    history of right shoulder pain since  intermittently and has progressively worsened  Received an injection in right shoulder on ~ 1 5 weeks ago  On left shoulder has a history of left shoulder pain since summer no specific incident initiating pain but then slipped on wet floor ~ 2 months ago and fell on left shoulder  Had 3 injections in left shoulder since summer    Current symptom location includes erica  shoulder pain in area of lateral brachium, posterior shoulder and under arm   and patient describes  current symptoms as: sharp, mostly achy  Symptoms are : intermittent  Pain level:  Current: 7/10  At Best: 3/10   At worst: 10/10 which occurs with certain movements or when she lies on shoudler at night  and with ADL's 7-8/10  Symptoms improve with: resting arms by her side, and worsen with getting pulled by the dogs during a walk, reaching overhead  Overall symptom progression at this time is worsening  Previous injury to this area: chronic  Previous treatment includes: as stated above, no PT  Diagnostic testing includes: xray erica: Inc arthritis   PMHx includes: See chart for full details with medications, allergies, past family history, past medical history, social history, past surgical history and problem list  All topics were reviewed  Functionally, at baseline, Zunilda Kwon is independent with all basic ADL's and  advanced ADL's    Currently, Zunilda Kwon is limited in the following activities: reaching overhead, lifting with either UE  Jordy Magaña is lives with  and 6 dogs   Recreational activities include: swimming, skiing, play softball   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[address], registers  Patient goal(s) for physical therapy is: to reduce pain       The following is a summary of Jordy Magaña objective status:    Impairments:   Postural dysfunction  Reduced ROM  Reduced strength  + TTP and increased soft tissue density  Reduced flexibility  Reduced stability  Transfers impairments  Reduced activity tolerance including above stated functional limitations    Patient's clinical presentation is consistent with their referring diagnosis of: asael shoudler impingement/pain  Patient presents with increased pain asael shoudler left greater then right with reduced ROm and strength  Patient states ortho discussed possible TSR but she is uninterested in surgery at this time  She will benefit form PT to maximize use of Asael UE and reduce shoudler pain  PLOC was discussed and agreed upon with patient  Patient was educated on: HEP and [address]   Patient vocalized a good understanding of  PLOC and HEP issued  Jordy Magaña would benefit from skilled physical therapy services to address their aforementioned functional limitations and progress towards prior level of function, to meet stated goals,  and independence with home exercise program   Prognosis for successful rehab outcome is fair with consistent participation in therapy and carryover of HEP and PLOC       Functional limitations: see subjectiveUnderstanding of Dx/Px/POC: good   Prognosis: good    Goals  STG   + Patient will have pain level 0/10 shoulder  at rest (2-3 weeks)  + Patient will report a 35% improvement in symptoms (2-3 weeks)   + Patient will be independent in basic HEP (2-3 weeks)  + Patient will demonstrate an increase in range of motion shoulder  Left PROM in all planes  to  within normal limits (2-3 weeks)  + Patient will demonstrate appropriate scapulohumeral rhythm with reaching shoudler height (2-3 weeks)  + Patient will report increased ease reaching due to a reduction in pain (2-3 weeks)    LTG  + Patient will have pain level 2/10 shoulder  with ADL's (4-6 weeks)  + Patient will report a 60% improvement in symptoms (4-6 weeks)   + Patient will increase FOTO score by 10 points (8 sessions)   + Patient will be independent in comprehensive HEP (4-6 weeks)  + Patient will demonstrate an increase in range of motion shoulder AROM in all planes  to  within normal limits  (4-6 weeks)  + Patient will demonstrate increase  MMT of shoulder to  4/5  for maximum function  (4-6 weeks)  + Patient will demonstrate functional reaching without compensatory patterns in all three motions  (4-6 weeks)      Plan  Plan details: 1-3 x week, 4- 6 weeks: HEP development, stretching postural muscles and upper quadrant musculature, strengthening scap and shoulder musculature, A/AA/PROM prn, joint mobilizations ST/GH/Tspine/Rib/cervical spine prn per objective findings, posture education, STM/MI as needed to reduce muscle tension per objective findings, muscle reeducation per objective findings, scap retraining prn, Carmelo Holland discussed and agreed upon with patient       Patient would benefit from: PT eval and skilled physical therapy  Planned modality interventions: cryotherapy and thermotherapy: hydrocollator packs  Planned therapy interventions: abdominal trunk stabilization, joint mobilization, activity modification, ADL training, neuromuscular re-education, body mechanics training, patient education, postural training, strengthening, stretching, therapeutic activities, therapeutic exercise, flexibility, functional ROM exercises, graded exercise and home exercise program  Frequency: 2x week  Plan of Care beginning date: 4/28/2022  Plan of Care expiration date: 6/9/2022  Treatment plan discussed with: patient        Subjective    Objective     Static Posture Comments  Shoulder:  Posture:  Sitting: grossly wnl      Cervical ROM:  Flexion:wnl    Extension:moderate  + pain   Rotation right:minimal   Rotation left:moderate   SB right:moderate   SB left: moderate     Palpation:+ TTP in the following muscles: sub scap, serratus anterior on right, left pec minor, teres complex     Functional reaching: (tested in standing)  Overhead: Right: reduced ~ 20 %, painful Left: limited by 20%, painful   Behind head: Right: WFL, painfree Left: WFL, painful  Behind back :  Right: WFL, painful  Left: limited by 15%, painful     Scapular Mechanics: postures in protracted postion: increased scap protraction and reduced upward rotation  MMT:  Flexion (standing): Right: 4-/5  Left: 3+/5   Abduction (standing): Right: 4-/5    Left: 3+/5    ER: Right: 4-/5   Left: 3+/5   IR: Right: 4/5    Left: 4/5   Adduction: Right: 4/5    Left: 4/5   Extension: Right: 4/5    Left: 4/5       ROM:  Flexion: Right: 172  + pain at end range  Left: 155 + pain    Abduction: Right: wnl    Left: grossly wnl    Er (supine 45 deg abd): Right: wnl    Left: grossly wnl    IR (supine at 45 deg abd) : Right: wnl  Left: grossly wnl      Special tests: + flexion test on renu maldonado rory: + pain erica left greater then right  Drop arm Right: neg, left neg  Supraspinatus: Right: + pain 4-/5, left + pain, 3-/5   Thoracic dysfunction: prn             Eval/ Re-eval Auth #/ Referral # Total visits Start date  Expiration date Total active units  Total manual units  PT only or  PT+OT?                                                             Date:           Total authorized units:  Active:            Manual:           Total remaining units:  Active:               Manual:                Date:           Total authorized units: Active:            Manual:           Total remaining units:  Active:               Manual:                    Precautions cardiac    Specialty Daily Treatment Diary       Insurance:        Visits: 1 Manual: 4/29/22       PROM shoulder        GHJ mobs        Scap mobs        STM/MI prn                        Ther ex:         Protocol:        UBE/Nustep        Pulleys        Scap Isometrics instruct       AAROM: flexion instruct       Tubing rows        Tubing ext                        Neuro florentin:         Scap resetting: P/AA/AROM        Upward scap rotation retraining P/AA/AROM        Dyn stab: flexion @120        Dyn stab: IR/ER @ neutral                Therapeutic Activity:         Reevaluation                HEP:                Modalities        MH        Ice        Total time:                 Access Code: Bacilio Velez  URL: [email or web address]  Date: 04/28/2022  Prepared by:  Aubree Kaur    Exercises  · Supine Shoulder Flexion Extension AAROM with Dowel - 1 x daily - 7 x weekly - 1 sets - 10 reps  · Seated Scapular Retraction - 1 x daily - 7 x weekly - 1 sets - 10 reps - 5 sec hold

## 2022-04-29 PROCEDURE — 97161 PT EVAL LOW COMPLEX 20 MIN: CPT

## 2022-05-03 ENCOUNTER — OFFICE VISIT (OUTPATIENT)
Dept: PHYSICAL THERAPY | Facility: CLINIC | Age: 72
End: 2022-05-03
Payer: COMMERCIAL

## 2022-05-03 DIAGNOSIS — G89.29 CHRONIC LEFT SHOULDER PAIN: Primary | ICD-10-CM

## 2022-05-03 DIAGNOSIS — M25.511 CHRONIC RIGHT SHOULDER PAIN: ICD-10-CM

## 2022-05-03 DIAGNOSIS — G89.29 CHRONIC RIGHT SHOULDER PAIN: ICD-10-CM

## 2022-05-03 DIAGNOSIS — M25.512 CHRONIC LEFT SHOULDER PAIN: Primary | ICD-10-CM

## 2022-05-03 PROCEDURE — 97110 THERAPEUTIC EXERCISES: CPT

## 2022-05-03 PROCEDURE — 97112 NEUROMUSCULAR REEDUCATION: CPT

## 2022-05-03 NOTE — PROGRESS NOTES
Daily Note         Today's date: 5/3/2022  Patient name: Jacob Onofre  :   MRN: [id number]  Referring provider: No ref  provider found  Dx:   Encounter Diagnosis     ICD-10-CM    1  Chronic left shoulder pain  M25 512     G89 29    2  Chronic right shoulder pain  M25 511     G89 29        Subjective: Jacob Onofre reports pain level 2/10 with rest and 7/10 with movement  Reports she is having an MRI on Thursday  Objective: See treatment diary below    Assessment:  only fair tolerance to ther ex due to pain with movemvent specificlaly with shoudler fleixon with cane in supine  all actvities behind back with comfortable but any actvitiy forward and overhead is painful/   Good form noted with ball roll outs, but still has significant forward shoulder posture  Patient presents with larger chest which may be contributing to this posture  Only fair tolerance to posterior strengthening  Plan: progress as tolerated           Eval/ Re-eval Auth #/ Referral # Total visits Start date  Expiration date Total active units  Total manual units  PT only or  PT+OT?   22 [employer]    99                                                        Precautions cardiac    Specialty Daily Treatment Diary       Insurance:        Visits: 1 2      Manual: 4/29/22 5/3/22      PROM shoulder  Performed erica       GHJ mobs  Inf glides: performed erica       Scap mobs        STM/MI prn  MI Upper trap  pec minor                      Ther ex:         Protocol:        UBE/Nustep        Pulleys: flex, retract, rtxn/abd   15 x each flex, retract      Scap Isometrics instruct 5 sec x 10        AAROM: flexion,shldr IR/ER, ext instruct Flex 5 x ,   10 x each       Tubing rows  Yellow: 10 x       Tubing ext  Yellow: 10 x                      Neuro florentin:         Scap resetting: P/AA/AROM        Upward scap rotation retraining P/AA/AROM        Dyn stab: flexion @120  10 sec x 5        Dyn stab: IR/ER @ neutral  10 sec x 5 Therapeutic Activity:         Reevaluation                HEP:                Modalities        MH        Ice        Total time:                 Access Code: Beny Hua  URL: [email or web address]  Date: 04/28/2022  Prepared by:  Haja Mccord    Exercises  · Supine Shoulder Flexion Extension AAROM with Dowel - 1 x daily - 7 x weekly - 1 sets - 10 reps  · Seated Scapular Retraction - 1 x daily - 7 x weekly - 1 sets - 10 reps - 5 sec hold

## 2022-05-10 ENCOUNTER — APPOINTMENT (OUTPATIENT)
Dept: PHYSICAL THERAPY | Facility: CLINIC | Age: 72
End: 2022-05-10
Payer: COMMERCIAL

## 2022-05-17 ENCOUNTER — OFFICE VISIT (OUTPATIENT)
Dept: PHYSICAL THERAPY | Facility: CLINIC | Age: 72
End: 2022-05-17
Payer: COMMERCIAL

## 2022-05-17 DIAGNOSIS — M25.512 CHRONIC LEFT SHOULDER PAIN: ICD-10-CM

## 2022-05-17 DIAGNOSIS — M25.511 CHRONIC RIGHT SHOULDER PAIN: Primary | ICD-10-CM

## 2022-05-17 DIAGNOSIS — G89.29 CHRONIC LEFT SHOULDER PAIN: ICD-10-CM

## 2022-05-17 DIAGNOSIS — G89.29 CHRONIC RIGHT SHOULDER PAIN: Primary | ICD-10-CM

## 2022-05-17 PROCEDURE — 97110 THERAPEUTIC EXERCISES: CPT

## 2022-05-18 NOTE — PROGRESS NOTES
Daily Note         Today's date: 2022  Patient name: Jacob Onofre  :   MRN: [id number]  Referring provider: No ref  provider found  Dx:   Encounter Diagnosis     ICD-10-CM    1  Chronic right shoulder pain  M25 511     G89 29    2  Chronic left shoulder pain  M25 512     G89 29        Subjective: Jacob Onofre reports 01/10/ pain in left shoulder this am   States she had a stress test done, and she has to have a catherization tomorrow  Objective: See treatment diary below    Assessment:  patient has good motivation to improve  reported pain with resisted shoulder ER and ext at end range with bands     Next session cancelled due to patient having cardiac catherization tomorrow  Plan: progress as toelrated          Eval/ Re-eval Auth #/ Referral # Total visits Start date  Expiration date Total active units  Total manual units  PT only or  PT+OT?   22 [employer]    99                                                        Precautions cardiac    Specialty Daily Treatment Diary       Insurance:        Visits: 1 2 3     Manual: 4/29/22 5/3/22 5/17/22     PROM shoulder  Performed erica  Performed erica     GHJ mobs  Inf glides: performed erica  Performed left      Scap mobs        STM/MI prn  MI Upper trap  pec minor --                     Ther ex:         Protocol:        UBE/Nustep        Pulleys: flex, retract, rtxn/abd   15 x each flex, retract Flex: 15 x each      Scap Isometrics instruct 5 sec x 10   5 sec x 10       AAROM: flexion,shldr IR/ER, ext instruct Flex 5 x ,   10 x each  IR/Ext: 5 sec x 10  Each      Tubing rows  Yellow: 10 x  Yellow: 10 x 2      Tubing ext  Yellow: 10 x Yellow: 10 x 2      tband ER    Yellow: 10 x 2 right only due to pain in left      tband IR    Yellow: 10 x 2     Neuro florentin:         Scap resetting: P/AA/AROM        Upward scap rotation retraining P/AA/AROM        Dyn stab: flexion @120  10 sec x 5   10 sec x 5       Dyn stab: IR/ER @ neutral  10 sec x 5   10 sec x 5               Therapeutic Activity:         Reevaluation                HEP:                Modalities        MH        Ice        Total time:                 Access Code: Tito Falk  URL: [email or web address]  Date: 04/28/2022  Prepared by:  Mayda Gallagher    Exercises  · Supine Shoulder Flexion Extension AAROM with Dowel - 1 x daily - 7 x weekly - 1 sets - 10 reps  · Seated Scapular Retraction - 1 x daily - 7 x weekly - 1 sets - 10 reps - 5 sec hold

## 2022-05-19 ENCOUNTER — APPOINTMENT (OUTPATIENT)
Dept: PHYSICAL THERAPY | Facility: CLINIC | Age: 72
End: 2022-05-19
Payer: COMMERCIAL

## 2022-05-24 ENCOUNTER — APPOINTMENT (OUTPATIENT)
Dept: PHYSICAL THERAPY | Facility: CLINIC | Age: 72
End: 2022-05-24
Payer: COMMERCIAL

## 2022-05-31 ENCOUNTER — HOSPITAL ENCOUNTER (EMERGENCY)
Facility: HOSPITAL | Age: 72
Discharge: HOME | End: 2022-05-31
Attending: EMERGENCY MEDICINE
Payer: COMMERCIAL

## 2022-05-31 VITALS
TEMPERATURE: 98.2 F | DIASTOLIC BLOOD PRESSURE: 70 MMHG | RESPIRATION RATE: 18 BRPM | HEART RATE: 80 BPM | OXYGEN SATURATION: 97 % | SYSTOLIC BLOOD PRESSURE: 151 MMHG

## 2022-05-31 DIAGNOSIS — M79.89 LEFT LEG SWELLING: Primary | ICD-10-CM

## 2022-05-31 PROCEDURE — 99284 EMERGENCY DEPT VISIT MOD MDM: CPT | Performed by: EMERGENCY MEDICINE

## 2022-05-31 PROCEDURE — 99283 EMERGENCY DEPT VISIT LOW MDM: CPT

## 2022-06-01 ENCOUNTER — HOSPITAL ENCOUNTER (OUTPATIENT)
Dept: VASCULAR ULTRASOUND | Facility: HOSPITAL | Age: 72
Discharge: HOME | End: 2022-06-01
Attending: EMERGENCY MEDICINE
Payer: COMMERCIAL

## 2022-06-01 DIAGNOSIS — M79.89 LEFT LEG SWELLING: ICD-10-CM

## 2022-06-01 PROCEDURE — 93971 EXTREMITY STUDY: CPT

## 2022-06-01 NOTE — ED ATTENDING ATTESTATION
5/31/2022  I, Mina Chery MD, saw and evaluated the patient  I have discussed the patient with the resident/non-physician practitioner and agree with the resident's/non-physician practitioner's findings, Plan of Care, and MDM as documented in the resident's/non-physician practitioner's note, except where noted  All available labs and Radiology studies were reviewed  I was present for key portions of any procedure(s) performed by the resident/non-physician practitioner and I was immediately available to provide assistance  At this point I agree with the current assessment done in the Emergency Department  I have conducted an independent evaluation of this patient a history and physical is as follows:    ED Course         Critical Care Time  Procedures    Patient is a pleasant 66 yof who presents with swelling behind the left knee  No f/c/s  No wounds  + ecchymosis  No trauma  MDM pleasant 66 yof, will rule out dvt

## 2022-06-01 NOTE — DISCHARGE INSTRUCTIONS
Call and schedule the appointment for the ultrasound  Return to the emergency department should you develop shortness of breath, chest pain, worsening swelling or tenderness, or any other concerning symptoms
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
## 2022-06-02 ENCOUNTER — OFFICE VISIT (OUTPATIENT)
Dept: PHYSICAL THERAPY | Facility: CLINIC | Age: 72
End: 2022-06-02
Payer: MEDICARE

## 2022-06-02 DIAGNOSIS — M25.511 CHRONIC RIGHT SHOULDER PAIN: ICD-10-CM

## 2022-06-02 DIAGNOSIS — G89.29 CHRONIC RIGHT SHOULDER PAIN: ICD-10-CM

## 2022-06-02 DIAGNOSIS — M25.512 CHRONIC LEFT SHOULDER PAIN: Primary | ICD-10-CM

## 2022-06-02 DIAGNOSIS — G89.29 CHRONIC LEFT SHOULDER PAIN: Primary | ICD-10-CM

## 2022-06-02 PROCEDURE — 97112 NEUROMUSCULAR REEDUCATION: CPT | Performed by: PHYSICAL THERAPIST

## 2022-06-02 PROCEDURE — 97110 THERAPEUTIC EXERCISES: CPT | Performed by: PHYSICAL THERAPIST

## 2022-06-02 PROCEDURE — 97140 MANUAL THERAPY 1/> REGIONS: CPT | Performed by: PHYSICAL THERAPIST

## 2022-06-02 PROCEDURE — 93971 EXTREMITY STUDY: CPT | Performed by: SURGERY

## 2022-06-02 NOTE — PROGRESS NOTES
Daily Note         Today's date: 2022  Patient name: Eber Fregoso  :   MRN: [id number]  Referring provider: No ref  provider found  Dx:   Encounter Diagnosis     ICD-10-CM    1  Chronic right shoulder pain  M25 511     G89 29    2  Chronic left shoulder pain  M25 512     G89 29        Subjective: Eber Fregoso reports 10/10 pain in left shoulder and 9/10 R shoulder this am   "I need to have MRI of my left shoulder"    Objective: See treatment diary below    Assessment:  patient has good motivation to improve  Pt states she has a Rx for an MRI for her L shoulder and she is going to call to schedule appt  Pt reported pain with resisted shoulder ER and ext at end range with bands  Pt to see ortho on  for f/u  Plan: progress as tolerated  Re-eval IN 1-2 weeks as per primary PT  MRI L shoulder pending          Eval/ Re-eval Auth #/ Referral # Total visits Start date  Expiration date Total active units  Total manual units  PT only or  PT+OT?   22 aetna mcare    99      As of  [facility]                                                  Precautions cardiac    Specialty Daily Treatment Diary       Insurance:    Melissa Ramiro:     Visits: 1 2 3 4    Manual: 4/29/22 5/3/22 5/17/22 6/2    PROM shoulder  Performed erica  Performed erica x10 min B /STM    GHJ mobs  Inf glides: performed erica  Performed left  x3 min B    Scap mobs        STM/MI prn  MI Upper trap  pec minor --                     Ther ex:         Protocol:        UBE/Nustep        Pulleys: flex, retract, rtxn/abd   15 x each flex, retract Flex: 15 x each  Flex 20x    Scap Isometrics instruct 5 sec x 10   5 sec x 10   5 sec x 10    AAROM: flexion,shldr IR/ER, ext instruct Flex 5 x ,   10 x each  IR/Ext: 5 sec x 10  Each      Tubing rows  Yellow: 10 x  Yellow: 10 x 2  Yellow 2x10    Tubing ext  Yellow: 10 x Yellow: 10 x 2  Yellow 2x10    tband ER    Yellow: 10 x 2 right only due to pain in left  Yellow 2x10 R , L side  Unable due to pain    tband IR    Yellow: 10 x 2 Yellow 2x10    Neuro florentin:         Scap resetting: P/AA/AROM        Upward scap rotation retraining P/AA/AROM        Dyn stab: flexion @120  10 sec x 5   10 sec x 5   10 sec x 5    Dyn stab: IR/ER @ neutral  10 sec x 5   10 sec x 5   10 sec x 5            Therapeutic Activity:         Reevaluation                HEP:                Modalities        MH        Ice        Total time:                 Access Code: Ricci Kenney  URL: [email or web address]  Date: 04/28/2022  Prepared by:  Liseth Carranza    Exercises  · Supine Shoulder Flexion Extension AAROM with Dowel - 1 x daily - 7 x weekly - 1 sets - 10 reps  · Seated Scapular Retraction - 1 x daily - 7 x weekly - 1 sets - 10 reps - 5 sec hold

## 2022-06-07 ENCOUNTER — APPOINTMENT (OUTPATIENT)
Dept: PHYSICAL THERAPY | Facility: CLINIC | Age: 72
End: 2022-06-07
Payer: MEDICARE

## 2022-06-09 ENCOUNTER — TELEPHONE (OUTPATIENT)
Dept: PHYSICAL THERAPY | Facility: CLINIC | Age: 72
End: 2022-06-09

## 2022-06-16 ENCOUNTER — APPOINTMENT (OUTPATIENT)
Dept: PHYSICAL THERAPY | Facility: CLINIC | Age: 72
End: 2022-06-16
Payer: MEDICARE

## 2022-06-17 ENCOUNTER — TELEPHONE (OUTPATIENT)
Dept: PHYSICAL THERAPY | Facility: CLINIC | Age: 72
End: 2022-06-17

## 2022-06-17 ENCOUNTER — TELEPHONE (OUTPATIENT)
Dept: OTHER | Facility: OTHER | Age: 72
End: 2022-06-17

## 2022-06-17 ENCOUNTER — OFFICE VISIT (OUTPATIENT)
Dept: INTERNAL MEDICINE CLINIC | Facility: OTHER | Age: 72
End: 2022-06-17
Payer: MEDICARE

## 2022-06-17 ENCOUNTER — HOSPITAL ENCOUNTER (OUTPATIENT)
Dept: RADIOLOGY | Facility: IMAGING CENTER | Age: 72
Discharge: HOME | End: 2022-06-17
Payer: MEDICARE

## 2022-06-17 VITALS
SYSTOLIC BLOOD PRESSURE: 126 MMHG | BODY MASS INDEX: 30.02 KG/M2 | HEIGHT: 61 IN | OXYGEN SATURATION: 99 % | TEMPERATURE: 98 F | HEART RATE: 76 BPM | WEIGHT: 159 LBS | DIASTOLIC BLOOD PRESSURE: 76 MMHG

## 2022-06-17 DIAGNOSIS — M54.6 ACUTE MIDLINE THORACIC BACK PAIN: ICD-10-CM

## 2022-06-17 DIAGNOSIS — R07.81 RIB PAIN: ICD-10-CM

## 2022-06-17 DIAGNOSIS — M54.6 ACUTE MIDLINE THORACIC BACK PAIN: Primary | ICD-10-CM
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
## 2022-06-17 PROCEDURE — 72072 X-RAY EXAM THORAC SPINE 3VWS: CPT

## 2022-06-17 PROCEDURE — 99214 OFFICE O/P EST MOD 30 MIN: CPT | Performed by: NURSE PRACTITIONER

## 2022-06-17 PROCEDURE — 1124F ACP DISCUSS-NO DSCNMKR DOCD: CPT | Performed by: NURSE PRACTITIONER

## 2022-06-17 PROCEDURE — 71111 X-RAY EXAM RIBS/CHEST4/> VWS: CPT

## 2022-06-17 RX ORDER — AZITHROMYCIN 500 MG/1
TABLET, FILM COATED ORAL
COMMUNITY
End: 2022-06-17

## 2022-06-17 RX ORDER — METHOCARBAMOL 500 MG/1
500 TABLET, FILM COATED ORAL 3 TIMES DAILY PRN
Qty: 30 TABLET | Refills: 0 | Status: SHIPPED | OUTPATIENT
Start: 2022-06-17

## 2022-06-17 RX ORDER — RANOLAZINE 500 MG/1
TABLET, EXTENDED RELEASE ORAL
COMMUNITY

## 2022-06-17 NOTE — ASSESSMENT & PLAN NOTE
- s/p fall in driveway  - bilateral rib pain with deep breathing  - advised rest, avoid lifting and bending  Brace ribs for coughing, sneezing, deep breathing  - continue extra strength tylenol for pain, cannot use NSAIDs  Offered short course of tramadol but pt declined     - follow XR results, work note given to remain out of work until at least next Friday
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
## 2022-06-17 NOTE — ASSESSMENT & PLAN NOTE
- on driveway on 6/34, no head strike or LOC  - thoracic back pain and rib pain, XRs as ordered  - slipped

## 2022-06-17 NOTE — LETTER
June 17, 2022     Patient: Eileen Ta  YOB: 1950  Date of Visit: 6/17/2022      To Whom it May Concern:    Elise Goldmann is under my professional care  Nicki Phlegm was seen in my office on 6/17/2022  Nicki Phlegm may return to work on 6/24/2022       If you have any questions or concerns, please don't hesitate to call           Sincerely,          DANDRE Tatum        CC: No Recipients

## 2022-06-17 NOTE — ASSESSMENT & PLAN NOTE
- s/p fall in driveway  - spinal tenderness on exam, will check STAT XR thoracic spine  - advised rest, avoid lifting and bending  - continue extra strength tylenol for pain, cannot use NSAIDs  Offered short course of tramadol but pt declined     - follow XR results, work note given to remain out of work until at least next Friday

## 2022-06-23 ENCOUNTER — TELEPHONE (OUTPATIENT)
Dept: PHYSICAL THERAPY | Facility: CLINIC | Age: 72
End: 2022-06-23

## 2022-06-23 ENCOUNTER — APPOINTMENT (OUTPATIENT)
Dept: PHYSICAL THERAPY | Facility: CLINIC | Age: 72
End: 2022-06-23
Payer: MEDICARE

## 2022-06-29 ENCOUNTER — OFFICE VISIT (OUTPATIENT)
Dept: INTERNAL MEDICINE CLINIC | Facility: CLINIC | Age: 72
End: 2022-06-29
Payer: MEDICARE

## 2022-06-29 VITALS
SYSTOLIC BLOOD PRESSURE: 108 MMHG | HEIGHT: 66 IN | WEIGHT: 156 LBS | BODY MASS INDEX: 25.07 KG/M2 | OXYGEN SATURATION: 98 % | DIASTOLIC BLOOD PRESSURE: 74 MMHG | TEMPERATURE: 97 F | HEART RATE: 63 BPM

## 2022-06-29 DIAGNOSIS — M54.6 ACUTE MIDLINE THORACIC BACK PAIN: Primary | ICD-10-CM
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
## 2022-06-29 DIAGNOSIS — N18.30 STAGE 3 CHRONIC KIDNEY DISEASE, UNSPECIFIED WHETHER STAGE 3A OR 3B CKD (HCC): ICD-10-CM

## 2022-06-29 PROBLEM — R07.81 RIB PAIN: Status: RESOLVED | Noted: 2022-06-17 | Resolved: 2022-06-29

## 2022-06-29 PROCEDURE — 99213 OFFICE O/P EST LOW 20 MIN: CPT | Performed by: NURSE PRACTITIONER
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
## 2022-06-29 NOTE — ASSESSMENT & PLAN NOTE
- s/p fall on driveway 2 weeks ago  - XR 6/17 showed no acute osseous abnormality, generalized osteopenia and multilevel degenerative disc disease  - ongoing spinal pain and tenderness  Will check CT thoracic spine to further evaluate for fracture  - she will remain out of work until next follow up in 2 weeks  - advised to use tramadol and tylenol prn  She cannot use NSAIDs     - no excessive bending, twisting or lifting

## 2022-06-30 ENCOUNTER — OFFICE VISIT (OUTPATIENT)
Dept: PHYSICAL THERAPY | Facility: CLINIC | Age: 72
End: 2022-06-30
Payer: MEDICARE

## 2022-06-30 DIAGNOSIS — G89.29 CHRONIC LEFT SHOULDER PAIN: Primary | ICD-10-CM

## 2022-06-30 DIAGNOSIS — M25.512 CHRONIC LEFT SHOULDER PAIN: Primary | ICD-10-CM

## 2022-06-30 PROCEDURE — 97110 THERAPEUTIC EXERCISES: CPT | Performed by: PHYSICAL THERAPIST

## 2022-06-30 PROCEDURE — 97112 NEUROMUSCULAR REEDUCATION: CPT | Performed by: PHYSICAL THERAPIST

## 2022-06-30 NOTE — PROGRESS NOTES
Daily Note         Today's date: 2022  Patient name: Med Loredo  :   MRN: [id number]  Referring provider: No ref  provider found  Dx:   Encounter Diagnosis     ICD-10-CM    1  Chronic right shoulder pain  M25 511     G89 29    2  Chronic left shoulder pain  M25 512     G89 29        Subjective: Med Loredo reports 10/10 pain in left shoulder and 9/10 R shoulder this am   "I had the MRI of my left shoulder     Dr Randall Rizo said I have a large tear in my L shoulder  My R shoulder was not addressed and Im irritated about it"  "I fell 2 weeks ago    I slipped in driveway and fell flat on my back   It is now getting a little better but I have a prescription for therapy for my back" "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   22 aetna mcare    99      As of  [facility]                                                  Precautions cardiac    Specialty Daily Treatment Diary       Insurance:    [facility]:     Visits: 1 2 3 4 5   Manual: 4/29/22 5/3/22 5/17/22 6/2 6/30   PROM shoulder  Performed erica  Performed erica x10 min B /STM x10 min B shoulders PROM/STM , pt seated   GHJ mobs  Inf glides: performed erica  Performed left  x3 min B    Scap mobs        STM/MI prn  MI Upper trap  pec minor --                     Ther ex:         Protocol:        UBE/Nustep        Pulleys: flex, retract, rtxn/abd   15 x each flex, retract Flex: 15 x each  Flex 20x Flex x20   Scap Isometrics instruct 5 sec x 10   5 sec x 10   5 sec x 10 -   AAROM: flexion,shldr IR/ER, ext instruct Flex 5 x ,   10 x each  IR/Ext: 5 sec x 10  Each      Tubing rows  Yellow: 10 x  Yellow: 10 x 2  Yellow 2x10 Yellow x15   Tubing ext  Yellow: 10 x Yellow: 10 x 2  Yellow 2x10 Yellow 2x10   tband ER    Yellow: 10 x 2 right only due to pain in left  Yellow 2x10 R , L side  Unable due to pain x10 hold 5 Yellow TB   tband IR    Yellow: 10 x 2 Yellow 2x10 Yellow TB 2x10   Neuro florentin:         Scap resetting: P/AA/AROM        Upward scap rotation retraining P/AA/AROM        Dyn stab: flexion @120  10 sec x 5   10 sec x 5   10 sec x 5 -   Dyn stab: IR/ER @ neutral  10 sec x 5   10 sec x 5   10 sec x 5 -           Therapeutic Activity:         Reevaluation                HEP:                Modalities        MH        Ice        Total time:                 Access Code: Tito Falk  URL: [email or web address]  Date: 04/28/2022  Prepared by:  Mayda Gallagher    Exercises  · Supine Shoulder Flexion Extension AAROM with Dowel - 1 x daily - 7 x weekly - 1 sets - 10 reps  · Seated Scapular Retraction - 1 x daily - 7 x weekly - 1 sets - 10 reps - 5 sec hold

## 2022-07-07 ENCOUNTER — APPOINTMENT (OUTPATIENT)
Dept: PHYSICAL THERAPY | Facility: CLINIC | Age: 72
End: 2022-07-07
Payer: MEDICARE

## 2022-07-08 ENCOUNTER — APPOINTMENT (OUTPATIENT)
Dept: PHYSICAL THERAPY | Facility: CLINIC | Age: 72
End: 2022-07-08
Payer: MEDICARE

## 2022-07-08 ENCOUNTER — HOSPITAL ENCOUNTER (OUTPATIENT)
Dept: CT IMAGING | Facility: HOSPITAL | Age: 72
Discharge: HOME | End: 2022-07-08
Payer: MEDICARE
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
## 2022-07-08 DIAGNOSIS — M54.6 ACUTE MIDLINE THORACIC BACK PAIN: ICD-10-CM

## 2022-07-08 PROCEDURE — 72128 CT CHEST SPINE W/O DYE: CPT

## 2022-07-11 ENCOUNTER — TELEPHONE (OUTPATIENT)
Dept: INTERNAL MEDICINE CLINIC | Facility: OTHER | Age: 72
End: 2022-07-11

## 2022-07-11 NOTE — TELEPHONE ENCOUNTER
Светлана from [facility]'s reading room Kwan calling with significant findings of a CT scan that she had dome on 7/8/22 ordered by [facility]     Please advise since Riccardo Laura is off

## 2022-07-13 ENCOUNTER — EVALUATION (OUTPATIENT)
Dept: PHYSICAL THERAPY | Facility: CLINIC | Age: 72
End: 2022-07-13
Payer: MEDICARE

## 2022-07-13 DIAGNOSIS — M25.512 CHRONIC LEFT SHOULDER PAIN: Primary | ICD-10-CM

## 2022-07-13 DIAGNOSIS — G89.29 CHRONIC LEFT SHOULDER PAIN: Primary | ICD-10-CM

## 2022-07-13 PROCEDURE — 97530 THERAPEUTIC ACTIVITIES: CPT

## 2022-07-13 PROCEDURE — 97112 NEUROMUSCULAR REEDUCATION: CPT

## 2022-07-13 NOTE — PROGRESS NOTES
Daily Note/Progress Note      Today's date: 2022  Patient name: Gabrielle Monroy  : 1950  MRN: [id number]  Referring provider: Stacy Bender  Dx:   Encounter Diagnosis     ICD-10-CM    1  Chronic left shoulder pain  M25 512     G89 29        Subjective: Pt reports % improvement since SOC: 0%  Pain level at rest:  Left shoulder: 6 /10, pain level with ADLS:  0-, pain level at worst: 10/10 which occurs with movement in left shoulder  At this time, the functional limitations include: difficulty combing hair and lifting left UE against gravity  increased cracking occurs with movement  States she had difficulty attending PT due to numerous Dr appointments and had a cardiac catherization  State she had a fall when she slipped on oil on driveway and had ct scan of spine  Which she is still waiting for results  states she also had an MRI of the shoulder and full thickness tear and was told by ortho "he didn't think surgery would help"  Considering a second opinion     Objective: See treatment diary below    Goals  STG   + Patient will have pain level 0/10 shoulder  at rest (2-3 weeks) not met   + Patient will report a 35% improvement in symptoms (2-3 weeks) not met   + Patient will be independent in basic HEP (2-3 weeks) met   + Patient will demonstrate an increase in range of motion shoulder  Left PROM in all planes  to  within normal limits (2-3 weeks) not met   + Patient will demonstrate appropriate scapulohumeral rhythm with reaching shoudler height (2-3 weeks) not met   + Patient will report increased ease reaching due to a reduction in pain (2-3 weeks) not met     LTG  + Patient will have pain level 2/10 shoulder  with ADL's (4-6 weeks) not met   + Patient will report a 60% improvement in symptoms (4-6 weeks) not met   + Patient will increase FOTO score by 10 points (8 sessions) not met   + Patient will be independent in comprehensive HEP (4-6 weeks) not met   + Patient will demonstrate an increase in range of motion shoulder AROM in all planes  to  within normal limits  (4-6 weeks) not met   + Patient will demonstrate increase  MMT of shoulder to  4/5  for maximum function   (4-6 weeks) not met   + Patient will demonstrate functional reaching without compensatory patterns in all three motions  (4-6 weeks) not met     Objective (7/13/22)    Static Posture     Comments  Shoulder:  Posture:  Sitting: ( forward head)      Cervical ROM:  Flexion:wnl    Extension:moderate  + pain ( status quo)  Rotation right:minimal (status Quo)  Rotation left:moderate (status quo)  SB right:moderate (status quo)   SB left: moderate (status quo)     Palpation:+ TTP in the following muscles: sub scap, serratus anterior on right, left pec minor, teres complex (+ TTP: teres complex, bicep muscle belly    Functional reaching: (tested in standing)  Overhead: Right: reduced ~ 20 %, painful Left: limited by 20%, painful (Right: WFL, left reduced 20% + pain)  Behind head: Right: WFL, painfree Left: WFL, painful (reduced ~ 15% on left + pain)  Behind back :  Right: WFL, painful  Left: limited by 15%, painful (left: grossly wnl + pain)    Scapular Mechanics: postures in protracted postion: increased scap protraction and reduced upward rotation    MMT:  Flexion (standing): Right: 4-/5  Left: 3+/5 (left 3-/5)  Abduction (standing): Right: 4-/5    Left: 3+/5  ( left 3-/5)  ER: Right: 4-/5   Left: 3+/5 (left 3-/5)  IR: Right: 4/5    Left: 4/5  (left 4/5)   Adduction: Right: 4/5    Left: 4/5   Extension: Right: 4/5    Left: 4/5       ROM:  Flexion: Right: 172  + pain at end range  Left: 155 + pain ( Right: 170, left: 128 + pain at end range)   Abduction: Right: wnl    Left: grossly wnl PROM   Er (supine 45 deg abd): Right: wnl    Left: grossly wnl  (Right: status quo, left: 70 + pain at end range)  IR (supine at 45 deg abd) : Right: wnl  Left: grossly wnl  (Right: status quo, left : 70 deg + pain at end range)     Special tests: + flexion test on renu maldonado: + pain erica left greater then right (status quo)   Drop arm Right: neg, left neg  Supraspinatus: Right: + pain 4-/5, left + pain, 3-/5   Thoracic dysfunction: prn              Assessment: Gina Gaspar demonstrates reduction in ROM in left shoulder flexion since [facility]  Patient has only been able to attend PT x 4 sessions due to conflicts with other dr appointments due to other health issues  Patient presents with increased pain this session and therefore reduced objective data compared to right  patient had difficulty tolerating exercise program today  Discussed PLOC with patient and feels she may entertain a second opinion  Patient advised for PT to be beneficial, consistent attendance is warranted and she vocalized understanding     The goal status of Gina Gaspar is indicated above   Discussed PLOC with patient and advised patient is shoulder continues to demonstrate Inc pain, then will refer back to ortho to discuss other options  Plan: continue PT 1-2 x week for 4-6 weeks, MI/STM, P/AA/AROM , scap and shoudler strengthening, posture education  patient agreeable with plan           Eval/ Re-eval Auth #/ Referral # Total visits Start date  Expiration date Total active units  Total manual units  PT only or  PT+OT?   4/29/22 aetna mcare    99                                                   Insurance:   Leighton Rand: June 1     Visits: 2 3 4 5 6   Manual: 5/3/22 5/17/22 6/2 6/30 7/13/22   PROM shoulder Performed erica  Performed erica x10 min B /STM x10 min B shoulders PROM/STM , pt seated Supine PROM left all motions    GHJ mobs Inf glides: performed erica  Performed left  x3 min B     Scap mobs        STM/MI prn MI Upper trap  pec minor --                      Ther ex:         Protocol:        UBE/Nustep        Pulleys: flex, retract, rtxn/abd  15 x each flex, retract Flex: 15 x each  Flex 20x Flex x20    Scap Isometrics 5 sec x 10   5 sec x 10   5 sec x 10 - 5 sec x 10     AAROM: flexion,shldr IR/ER, ext Flex 5 x ,   10 x each  IR/Ext: 5 sec x 10  Each       Tubing rows Yellow: 10 x  Yellow: 10 x 2  Yellow 2x10 Yellow x15    Tubing ext Yellow: 10 x Yellow: 10 x 2  Yellow 2x10 Yellow 2x10    tband ER   Yellow: 10 x 2 right only due to pain in left  Yellow 2x10 R , L side  Unable due to pain x10 hold 5 Yellow TB    tband IR   Yellow: 10 x 2 Yellow 2x10 Yellow TB 2x10    Neuro florentin:         Scap resetting: P/AA/AROM        Upward scap rotation retraining P/AA/AROM        Dyn stab: flexion @120 10 sec x 5   10 sec x 5   10 sec x 5 - Dyn stab static iso hold: 5 sec x 10     Dyn stab: IR/ER @ neutral 10 sec x 5   10 sec x 5   10 sec x 5 - 10 sec x 5     Supine alphabet      1 x    Therapeutic Activity:         Reevaluation                HEP:                Modalities        MH        Ice        Total time:                        Access Code: Ryne Garcia  URL: [email or web address]  Date: 04/28/2022  Prepared by:  Chandanla Hemp    Exercises  · Supine Shoulder Flexion Extension AAROM with Dowel - 1 x daily - 7 x weekly - 1 sets - 10 reps  · Seated Scapular Retraction - 1 x daily - 7 x weekly - 1 sets - 10 reps - 5 sec hold

## 2022-07-13 NOTE — LETTER
2022    [facility]    Patient: Rosa Guzman   YOB: 1950   Date of Visit: 2022     Encounter Diagnosis     ICD-10-CM    1  Chronic left shoulder pain  M25 512     G89 29        Dear Dr Curly Concepcion: Thank you for your recent referral of Rosa Guzman  Please review the attached evaluation summary from Mirna's recent visit  Please verify that you agree with the plan of care by signing the attached order  If you have any questions or concerns, please do not hesitate to call  I sincerely appreciate the opportunity to share in the care of one of your patients and hope to have another opportunity to work with you in the near future  Sincerely,    Jose Lan, PT      Referring Provider:      I certify that I have read the below Plan of Care and certify the need for these services furnished under this plan of treatment while under my care  Rich Oseguera  Via Fax: [phone]          Daily Note/Progress Note      Today's date: 2022  Patient name: Rosa Guzman  : 1950  MRN: [id number]  Referring provider: Sabrina Evans  Dx:   Encounter Diagnosis     ICD-10-CM    1  Chronic left shoulder pain  M25 512     G89 29        Subjective: Pt reports % improvement since SOC: 0%  Pain level at rest:  Left shoulder: 6 /10, pain level with ADLS:  , pain level at worst: 10/10 which occurs with movement in left shoulder  At this time, the functional limitations include: difficulty combing hair and lifting left UE against gravity  increased cracking occurs with movement  States she had difficulty attending PT due to numerous Dr appointments and had a cardiac catherization  State she had a fall when she slipped on oil on driveway and had ct scan of spine  Which she is still waiting for results  states she also had an MRI of the shoulder and full thickness tear and was told by ortho "he didn't think surgery would help"   Considering a second opinion  Objective: See treatment diary below    Goals  STG   + Patient will have pain level 0/10 shoulder  at rest (2-3 weeks) not met   + Patient will report a 35% improvement in symptoms (2-3 weeks) not met   + Patient will be independent in basic HEP (2-3 weeks) met   + Patient will demonstrate an increase in range of motion shoulder  Left PROM in all planes  to  within normal limits (2-3 weeks) not met   + Patient will demonstrate appropriate scapulohumeral rhythm with reaching shoudler height (2-3 weeks) not met   + Patient will report increased ease reaching due to a reduction in pain (2-3 weeks) not met     LTG  + Patient will have pain level 2/10 shoulder  with ADL's (4-6 weeks) not met   + Patient will report a 60% improvement in symptoms (4-6 weeks) not met   + Patient will increase FOTO score by 10 points (8 sessions) not met   + Patient will be independent in comprehensive HEP (4-6 weeks) not met   + Patient will demonstrate an increase in range of motion shoulder AROM in all planes  to  within normal limits  (4-6 weeks) not met   + Patient will demonstrate increase  MMT of shoulder to  4/5  for maximum function   (4-6 weeks) not met   + Patient will demonstrate functional reaching without compensatory patterns in all three motions  (4-6 weeks) not met     Objective (7/13/22)    Static Posture     Comments  Shoulder:  Posture:  Sitting: ( forward head)      Cervical ROM:  Flexion:wnl    Extension:moderate  + pain ( status quo)  Rotation right:minimal (status Quo)  Rotation left:moderate (status quo)  SB right:moderate (status quo)   SB left: moderate (status quo)     Palpation:+ TTP in the following muscles: sub scap, serratus anterior on right, left pec minor, teres complex (+ TTP: teres complex, bicep muscle belly    Functional reaching: (tested in standing)  Overhead: Right: reduced ~ 20 %, painful Left: limited by 20%, painful (Right: WFL, left reduced 20% + pain)  Behind head: Right: WFL, painfree Left: WFL, painful (reduced ~ 15% on left + pain)  Behind back :  Right: WFL, painful  Left: limited by 15%, painful (left: grossly wnl + pain)    Scapular Mechanics: postures in protracted postion: increased scap protraction and reduced upward rotation    MMT:  Flexion (standing): Right: 4-/5  Left: 3+/5 (left 3-/5)  Abduction (standing): Right: 4-/5    Left: 3+/5  ( left 3-/5)  ER: Right: 4-/5   Left: 3+/5 (left 3-/5)  IR: Right: 4/5    Left: 4/5  (left 4/5)   Adduction: Right: 4/5    Left: 4/5   Extension: Right: 4/5    Left: 4/5       ROM:  Flexion: Right: 172  + pain at end range  Left: 155 + pain ( Right: 170, left: 128 + pain at end range)   Abduction: Right: wnl    Left: grossly wnl PROM   Er (supine 45 deg abd): Right: wnl    Left: grossly wnl  (Right: status quo, left: 70 + pain at end range)  IR (supine at 45 deg abd) : Right: wnl  Left: grossly wnl  (Right: status quo, left : 70 deg + pain at end range)     Special tests: + flexion test on renu maldonado rory: + pain erica left greater then right (status quo)   Drop arm Right: neg, left neg  Supraspinatus: Right: + pain 4-/5, left + pain, 3-/5   Thoracic dysfunction: prn              Assessment: Latricia Mckeon demonstrates reduction in ROM in left shoulder flexion since [facility]  Patient has only been able to attend PT x 4 sessions due to conflicts with other dr appointments due to other health issues  Patient presents with increased pain this session and therefore reduced objective data compared to right  patient had difficulty tolerating exercise program today  Discussed PLOC with patient and feels she may entertain a second opinion  Patient advised for PT to be beneficial, consistent attendance is warranted and she vocalized understanding     The goal status of Latricia Mckeon is indicated above    Discussed PLOC with patient and advised patient is shoulder continues to demonstrate Inc pain, then will refer back to ortho to discuss other options  Plan: continue PT 1-2 x week for 4-6 weeks, MI/STM, P/AA/AROM , scap and shoudler strengthening, posture education  patient agreeable with plan  Eval/ Re-eval Auth #/ Referral # Total visits Start date  Expiration date Total active units  Total manual units  PT only or  PT+OT?   4/29/22 amber gardner    99                                                   Insurance:   Adrienne Preston: June 1     Visits: 2 3 4 5 6   Manual: 5/3/22 5/17/22 6/2 6/30 7/13/22   PROM shoulder Performed erica  Performed erica x10 min B /STM x10 min B shoulders PROM/STM , pt seated Supine PROM left all motions    GHJ mobs Inf glides: performed erica  Performed left  x3 min B     Scap mobs        STM/MI prn MI Upper trap  pec minor --                      Ther ex:         Protocol:        UBE/Nustep        Pulleys: flex, retract, rtxn/abd  15 x each flex, retract Flex: 15 x each  Flex 20x Flex x20    Scap Isometrics 5 sec x 10   5 sec x 10   5 sec x 10 - 5 sec x 10     AAROM: flexion,shldr IR/ER, ext Flex 5 x ,   10 x each  IR/Ext: 5 sec x 10  Each       Tubing rows Yellow: 10 x  Yellow: 10 x 2  Yellow 2x10 Yellow x15    Tubing ext Yellow: 10 x Yellow: 10 x 2  Yellow 2x10 Yellow 2x10    tband ER   Yellow: 10 x 2 right only due to pain in left  Yellow 2x10 R , L side  Unable due to pain x10 hold 5 Yellow TB    tband IR   Yellow: 10 x 2 Yellow 2x10 Yellow TB 2x10    Neuro florentin:         Scap resetting: P/AA/AROM        Upward scap rotation retraining P/AA/AROM        Dyn stab: flexion @120 10 sec x 5   10 sec x 5   10 sec x 5 - Dyn stab static iso hold: 5 sec x 10     Dyn stab: IR/ER @ neutral 10 sec x 5   10 sec x 5   10 sec x 5 - 10 sec x 5     Supine alphabet      1 x    Therapeutic Activity:         Reevaluation                HEP:                Modalities        MH        Ice        Total time:                        Access Code: [email or web address]  URL: [email or web address]  Date: 04/28/2022  Prepared by: Manjit Mercer    Exercises  · Supine Shoulder Flexion Extension AAROM with Dowel - 1 x daily - 7 x weekly - 1 sets - 10 reps  · Seated Scapular Retraction - 1 x daily - 7 x weekly - 1 sets - 10 reps - 5 sec hold

## 2022-07-14 ENCOUNTER — APPOINTMENT (OUTPATIENT)
Dept: PHYSICAL THERAPY | Facility: CLINIC | Age: 72
End: 2022-07-14
Payer: MEDICARE

## 2022-07-15 ENCOUNTER — TELEPHONE (OUTPATIENT)
Dept: PHYSICAL THERAPY | Facility: CLINIC | Age: 72
End: 2022-07-15

## 2022-07-20 ENCOUNTER — OFFICE VISIT (OUTPATIENT)
Dept: INTERNAL MEDICINE CLINIC | Facility: CLINIC | Age: 72
End: 2022-07-20
Payer: MEDICARE

## 2022-07-20 ENCOUNTER — APPOINTMENT (OUTPATIENT)
Dept: PHYSICAL THERAPY | Facility: CLINIC | Age: 72
End: 2022-07-20
Payer: MEDICARE

## 2022-07-20 VITALS
HEART RATE: 84 BPM | TEMPERATURE: 98.1 F | HEIGHT: 62 IN | WEIGHT: 157.4 LBS | OXYGEN SATURATION: 98 % | BODY MASS INDEX: 28.97 KG/M2 | SYSTOLIC BLOOD PRESSURE: 114 MMHG | DIASTOLIC BLOOD PRESSURE: 60 MMHG

## 2022-07-20 DIAGNOSIS — E55.9 VITAMIN D DEFICIENCY: ICD-10-CM

## 2022-07-20 DIAGNOSIS — E04.1 THYROID NODULE: ICD-10-CM

## 2022-07-20 DIAGNOSIS — Z13.220 SCREENING FOR LIPID DISORDERS: ICD-10-CM

## 2022-07-20 DIAGNOSIS — R60.0 LOWER EXTREMITY EDEMA: ICD-10-CM

## 2022-07-20 DIAGNOSIS — M85.80 OSTEOPENIA, UNSPECIFIED LOCATION: ICD-10-CM

## 2022-07-20 DIAGNOSIS — S22.039A CLOSED FRACTURE OF THIRD THORACIC VERTEBRA, UNSPECIFIED FRACTURE MORPHOLOGY, INITIAL ENCOUNTER (HCC): Primary | ICD-10-CM

## 2022-07-20 PROBLEM — M54.6 ACUTE MIDLINE THORACIC BACK PAIN: Status: RESOLVED | Noted: 2022-06-17 | Resolved: 2022-07-20

## 2022-07-20 PROCEDURE — 99214 OFFICE O/P EST MOD 30 MIN: CPT | Performed by: NURSE PRACTITIONER
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
## 2022-07-20 NOTE — PATIENT INSTRUCTIONS
Remain out of work for an addition 3 more weeks     Recommend posture corrector by orthoflexics     Schedule DEXA scan    Return to work

## 2022-07-21 ENCOUNTER — TELEPHONE (OUTPATIENT)
Dept: INTERNAL MEDICINE CLINIC | Facility: CLINIC | Age: 72
End: 2022-07-21

## 2022-07-21 NOTE — TELEPHONE ENCOUNTER
----- Message from Alfie Velasco MD sent at 7/21/2022  2:58 PM EDT -----  Regarding: Denosumab  Please look for approval for Prolia    Pt is post menopausal and has compression fracture of T3

## 2022-07-21 NOTE — ASSESSMENT & PLAN NOTE
- continue same medications  - weight stable, no rales or SOB  - recommend elevating legs   - follow up with cardiology as scheduled on friday

## 2022-07-21 NOTE — ASSESSMENT & PLAN NOTE
- Healing T3 vertebral fracture  - continue tylenol prn  - avoid heavy lifting   - will continue out of work x 3 weeks for a total of 8 weeks s/p injury  - symptoms significantly improved   - recommend back brace with thoracic support when returning to work, Aug 15  - check DEXA scan, update vitamin D, recommend Prolia

## 2022-07-22 ENCOUNTER — APPOINTMENT (OUTPATIENT)
Dept: PHYSICAL THERAPY | Facility: CLINIC | Age: 72
End: 2022-07-22
Payer: MEDICARE

## 2022-07-22 NOTE — TELEPHONE ENCOUNTER
Patient only has Medicare as primary right now  No secondary right now  States Medical Assistance pending  This could take 3 months approximately till we see anything  Would need to wait for the Prolia till the secondary comes in for me to help her out on this        Please advise if she should start on any oral medication till then

## 2022-07-28 ENCOUNTER — APPOINTMENT (OUTPATIENT)
Dept: PHYSICAL THERAPY | Facility: CLINIC | Age: 72
End: 2022-07-28
Payer: MEDICARE

## 2022-08-09 ENCOUNTER — TELEPHONE (OUTPATIENT)
Dept: OBGYN CLINIC | Facility: CLINIC | Age: 72
End: 2022-08-09

## 2022-08-09 NOTE — TELEPHONE ENCOUNTER
I called and lmom asking where MRI was done because we will need to call for report  Patient should bring imaging but we need report as well

## 2022-08-09 NOTE — TELEPHONE ENCOUNTER
Patient called back , her MRI was done at [facility] on [address]         She will be bringing disc to appointment

## 2022-08-24 ENCOUNTER — OFFICE VISIT (OUTPATIENT)
Dept: OBGYN CLINIC | Facility: CLINIC | Age: 72
End: 2022-08-24
Payer: MEDICARE

## 2022-08-24 VITALS
SYSTOLIC BLOOD PRESSURE: 117 MMHG | BODY MASS INDEX: 28.89 KG/M2 | DIASTOLIC BLOOD PRESSURE: 65 MMHG | HEIGHT: 62 IN | WEIGHT: 157 LBS | HEART RATE: 64 BPM

## 2022-08-24 DIAGNOSIS — S46.012A TRAUMATIC COMPLETE TEAR OF LEFT ROTATOR CUFF, INITIAL ENCOUNTER: Primary | ICD-10-CM

## 2022-08-24 DIAGNOSIS — M12.812 ROTATOR CUFF ARTHROPATHY OF LEFT SHOULDER: ICD-10-CM

## 2022-08-24 PROCEDURE — 99204 OFFICE O/P NEW MOD 45 MIN: CPT | Performed by: ORTHOPAEDIC SURGERY
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
## 2022-09-06 PROBLEM — R10.31 RIGHT LOWER QUADRANT ABDOMINAL PAIN: Status: RESOLVED | Noted: 2019-01-03 | Resolved: 2022-09-06

## 2022-09-06 PROBLEM — K64.5 THROMBOSED EXTERNAL HEMORRHOID: Status: RESOLVED | Noted: 2022-03-17 | Resolved: 2022-09-06

## 2022-09-06 PROBLEM — S39.011A ABDOMINAL MUSCLE STRAIN: Status: RESOLVED | Noted: 2021-07-13 | Resolved: 2022-09-06

## 2022-09-06 PROBLEM — R60.0 LOWER EXTREMITY EDEMA: Status: RESOLVED | Noted: 2019-01-07 | Resolved: 2022-09-06

## 2022-09-08 NOTE — PROGRESS NOTES
9/8/22 PT services have been discharged due to patient has not contacted clinic to schedule PT since last ortho visit  According to ortho notes, she is a surgical candidate for a reverse total shoulder  D/c status and goals status is as per most recent reevaluation

## 2022-10-09 ENCOUNTER — HOSPITAL ENCOUNTER (EMERGENCY)
Facility: HOSPITAL | Age: 72
Discharge: HOME | End: 2022-10-09
Attending: INTERNAL MEDICINE
Payer: MEDICARE

## 2022-10-09 VITALS
SYSTOLIC BLOOD PRESSURE: 150 MMHG | TEMPERATURE: 98.2 F | HEART RATE: 68 BPM | DIASTOLIC BLOOD PRESSURE: 58 MMHG | WEIGHT: 156 LBS | RESPIRATION RATE: 18 BRPM | HEIGHT: 62 IN | OXYGEN SATURATION: 97 % | BODY MASS INDEX: 28.71 KG/M2

## 2022-10-09 DIAGNOSIS — K08.89 DENTALGIA: Primary | ICD-10-CM

## 2022-10-09 DIAGNOSIS — K02.9 DENTAL CARIES: ICD-10-CM

## 2022-10-09 PROCEDURE — 99283 EMERGENCY DEPT VISIT LOW MDM: CPT

## 2022-10-09 PROCEDURE — 99284 EMERGENCY DEPT VISIT MOD MDM: CPT | Performed by: PHYSICIAN ASSISTANT

## 2022-10-09 RX ORDER — CLINDAMYCIN HYDROCHLORIDE 300 MG/1
300 CAPSULE ORAL EVERY 8 HOURS SCHEDULED
Qty: 30 CAPSULE | Refills: 0 | Status: SHIPPED | OUTPATIENT
Start: 2022-10-09 | End: 2022-10-19

## 2022-10-09 RX ORDER — ACETAMINOPHEN 325 MG/1
975 TABLET ORAL ONCE
Status: COMPLETED | OUTPATIENT
Start: 2022-10-09 | End: 2022-10-09

## 2022-10-09 RX ADMIN — ACETAMINOPHEN 975 MG: 325 TABLET ORAL at 13:48

## 2022-10-09 NOTE — DISCHARGE INSTRUCTIONS
Use Tylenol every 4 hours for pain or fever  You can try a few doses of Motrin, with caution  Take all oral antibiotics until done  Follow-up with your dentist in next few days
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
## 2022-10-11 PROBLEM — Z00.00 MEDICARE ANNUAL WELLNESS VISIT, SUBSEQUENT: Status: RESOLVED | Noted: 2020-08-14 | Resolved: 2022-10-11

## 2022-10-27 ENCOUNTER — OFFICE VISIT (OUTPATIENT)
Dept: PODIATRY | Facility: CLINIC | Age: 72
End: 2022-10-27
Payer: MEDICARE

## 2022-10-27 VITALS
HEIGHT: 62 IN | HEART RATE: 74 BPM | DIASTOLIC BLOOD PRESSURE: 68 MMHG | SYSTOLIC BLOOD PRESSURE: 128 MMHG | OXYGEN SATURATION: 97 % | BODY MASS INDEX: 29.15 KG/M2 | WEIGHT: 158.4 LBS

## 2022-10-27 DIAGNOSIS — B35.1 ONYCHOMYCOSIS: ICD-10-CM

## 2022-10-27 DIAGNOSIS — L85.2 PUNCTATE KERATOSIS: Primary | ICD-10-CM

## 2022-10-27 PROCEDURE — 99202 OFFICE O/P NEW SF 15 MIN: CPT | Performed by: PODIATRIST

## 2022-10-27 RX ORDER — FLUOROMETHOLONE ACETATE 1 MG/ML
0.1 SUSPENSION/ DROPS OPHTHALMIC 2 TIMES DAILY
COMMUNITY
Start: 2022-10-16
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
## 2022-11-03 PROBLEM — R19.4 CHANGE IN BOWEL HABITS: Status: ACTIVE | Noted: 2022-11-03

## 2022-12-13 ENCOUNTER — TELEPHONE (OUTPATIENT)
Dept: DERMATOLOGY | Facility: CLINIC | Age: 72
End: 2022-12-13

## 2022-12-13 NOTE — TELEPHONE ENCOUNTER
NP calling to schedule apt, informed of timeframes for locations; pt wants to be seen earlier and in [place] as its closer location

## 2022-12-15 ENCOUNTER — OFFICE VISIT (OUTPATIENT)
Dept: INTERNAL MEDICINE CLINIC | Facility: CLINIC | Age: 72
End: 2022-12-15

## 2022-12-15 VITALS
TEMPERATURE: 97.2 F | HEIGHT: 61 IN | HEART RATE: 63 BPM | OXYGEN SATURATION: 98 % | WEIGHT: 159.2 LBS | DIASTOLIC BLOOD PRESSURE: 60 MMHG | SYSTOLIC BLOOD PRESSURE: 124 MMHG | BODY MASS INDEX: 30.06 KG/M2
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
## 2022-12-15 DIAGNOSIS — S40.862A TICK BITE OF LEFT UPPER ARM, INITIAL ENCOUNTER: ICD-10-CM

## 2022-12-15 DIAGNOSIS — Z12.31 ENCOUNTER FOR SCREENING MAMMOGRAM FOR MALIGNANT NEOPLASM OF BREAST: Primary | ICD-10-CM

## 2022-12-15 DIAGNOSIS — M85.80 OSTEOPENIA, UNSPECIFIED LOCATION: ICD-10-CM

## 2022-12-15 DIAGNOSIS — N18.30 STAGE 3 CHRONIC KIDNEY DISEASE, UNSPECIFIED WHETHER STAGE 3A OR 3B CKD (HCC): ICD-10-CM

## 2022-12-15 DIAGNOSIS — I10 ESSENTIAL HYPERTENSION: ICD-10-CM

## 2022-12-15 DIAGNOSIS — I25.10 CORONARY ARTERY DISEASE INVOLVING NATIVE CORONARY ARTERY OF NATIVE HEART WITHOUT ANGINA PECTORIS: ICD-10-CM

## 2022-12-15 NOTE — PROGRESS NOTES
Name: Bita Sandhu      :       MRN: [id number]  Encounter Provider: Ernest Siemens, MD  Encounter Date: 12/15/2022   Encounter department: [address] [place] [address]     1  Encounter for screening mammogram for malignant neoplasm of breast  -     Mammo screening bilateral w 3d & cad; Future; Expected date: 01/15/2023    2  Stage 3 chronic kidney disease, unspecified whether stage 3a or 3b CKD (HCC)  -     CBC and differential; Future; Expected date: 2023  -     Comprehensive metabolic panel; Future; Expected date: 2023  -     Protein / creatinine ratio, urine; Future; Expected date: 2023    3  Coronary artery disease involving native coronary artery of native heart without angina pectoris  -     CBC and differential; Future; Expected date: 2023  -     Comprehensive metabolic panel; Future; Expected date: 2023  -     Lipid panel; Future; Expected date: 2023    4  Osteopenia, unspecified location    5  Essential hypertension  -     CBC and differential; Future; Expected date: 2023  -     Comprehensive metabolic panel; Future; Expected date: 2023    6  Tick bite of left upper arm, initial encounter  -     Lyme Antibody Profile with reflex to WB; Future  -     CBC and differential; Future; Expected date: 2023           Subjective      Patient presents to the office for follow-up visit  She reports that she still struck her right breast on a door of her dogs cage and developed a small hematoma the breast so she wanted to defer her mammogram until the hematoma resolved  She denies any pain or discomfort  She denies any discoloration  She also reports having had some tick bites over the course of the fall but does not recall seeing any erythema and is not having any arthritic complaints but she would like to be screened for Lyme disease    She has had no issues with any chest pain, palpitations, dizziness or dyspnea  She denies orthopnea or PND  She had a series of lab tests drawn in September which were reviewed  There were no significant abnormalities encountered  Her vitamin-D levels are low normal but her CBC appears normal   She does not have any neurological signs or symptoms that would be consistent with B12 deficiencies  She has no ambulation difficulties, balance is good  She denies any weakness or loss of muscle strength  Appetite is good  Weight has been stable  Review of Systems   Constitutional: Negative  Negative for activity change, appetite change, chills, diaphoresis, fatigue, fever and unexpected weight change  HENT: Negative  Eyes: Negative  Respiratory: Negative  Cardiovascular: Negative  Gastrointestinal: Negative  Endocrine: Negative  Genitourinary: Negative  Musculoskeletal: Negative  Skin: Negative  Neurological: Negative  Hematological: Negative  Psychiatric/Behavioral: The patient is not nervous/anxious          Current Outpatient Medications on File Prior to Visit   Medication Sig   • amLODIPine (NORVASC) 5 mg tablet Take 5 mg by mouth daily    • aspirin 81 mg chewable tablet Chew 81 mg daily    • atorvastatin (LIPITOR) 40 mg tablet Take 40 mg by mouth   • carvedilol (COREG) 12 5 mg tablet Take 12 5 mg by mouth daily     • clopidogrel (PLAVIX) 75 mg tablet Take 75 mg by mouth daily   • Co-Enzyme Q-10 100 MG CAPS Take 1 capsule by mouth in the morning   • omeprazole (PriLOSEC) 20 mg delayed release capsule take 1 capsule by oral route  every day before a meal   • [DISCONTINUED] Flarex 0 1 % ophthalmic suspension Administer 0 1 drops to both eyes 2 (two) times a day (Patient not taking: Reported on 12/15/2022)   • [DISCONTINUED] methocarbamol (ROBAXIN) 500 mg tablet Take 1 tablet (500 mg total) by mouth 3 (three) times a day as needed for muscle spasms (Patient not taking: Reported on 12/15/2022)   • [DISCONTINUED] ranolazine (RANEXA) 500 mg 12 hr tablet ranolazine  mg tablet,extended release,12 hr   TAKE 1 TABLET BY MOUTH TWICE DAILY (Patient not taking: Reported on 12/15/2022)   • [DISCONTINUED] traMADol (Ultram) 50 mg tablet Take 1 tablet (50 mg total) by mouth daily at bedtime (Patient not taking: Reported on 12/15/2022)       Objective     /60 (BP Location: Left arm, Patient Position: Sitting, Cuff Size: Standard)   Pulse 63   Temp (!) 97 2 °F (36 2 °C) (Temporal)   Ht 5' 1 34" (1 558 m)   Wt 72 2 kg (159 lb 3 2 oz)   SpO2 98%   BMI 29 75 kg/m²     Physical Exam  Vitals reviewed  Constitutional:       General: She is not in acute distress  Appearance: Normal appearance  She is normal weight  She is not ill-appearing, toxic-appearing or diaphoretic  HENT:      Head: Normocephalic and atraumatic  Right Ear: External ear normal       Left Ear: External ear normal       Nose: Nose normal    Eyes:      Conjunctiva/sclera: Conjunctivae normal       Pupils: Pupils are equal, round, and reactive to light  Cardiovascular:      Rate and Rhythm: Normal rate and regular rhythm  Pulses: Normal pulses  Heart sounds: Normal heart sounds  No murmur heard  Pulmonary:      Effort: Pulmonary effort is normal  No respiratory distress  Breath sounds: Normal breath sounds  No wheezing or rales  Abdominal:      General: Abdomen is flat  There is no distension  Musculoskeletal:         General: No swelling  Cervical back: Neck supple  No rigidity  Right lower leg: No edema  Left lower leg: No edema  Skin:     General: Skin is warm  Capillary Refill: Capillary refill takes less than 2 seconds  Coloration: Skin is not jaundiced  Findings: No bruising, erythema or rash  Neurological:      General: No focal deficit present  Mental Status: She is alert and oriented to person, place, and time  Mental status is at baseline     Psychiatric:         Mood and Affect: Mood normal          Behavior: Behavior normal        Juliana Rojas MD

## 2023-01-09 ENCOUNTER — HOSPITAL ENCOUNTER (OUTPATIENT)
Dept: RADIOLOGY | Age: 73
Discharge: HOME | End: 2023-01-09

## 2023-01-09 DIAGNOSIS — Z12.31 ENCOUNTER FOR SCREENING MAMMOGRAM FOR MALIGNANT NEOPLASM OF BREAST: ICD-10-CM

## 2023-01-12 ENCOUNTER — LAB REQUISITION (OUTPATIENT)
Dept: LAB | Facility: HOSPITAL | Age: 73
End: 2023-01-12

## 2023-01-12 DIAGNOSIS — D10.1 BENIGN NEOPLASM OF TONGUE: ICD-10-CM

## 2023-01-16 ENCOUNTER — APPOINTMENT (OUTPATIENT)
Dept: LAB | Facility: CLINIC | Age: 73
End: 2023-01-16

## 2023-01-16 ENCOUNTER — TELEPHONE (OUTPATIENT)
Dept: INTERNAL MEDICINE CLINIC | Facility: CLINIC | Age: 73
End: 2023-01-16

## 2023-01-16 DIAGNOSIS — S40.862A TICK BITE OF LEFT UPPER ARM, INITIAL ENCOUNTER: ICD-10-CM
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
## 2023-01-16 DIAGNOSIS — E78.00 PURE HYPERCHOLESTEROLEMIA: Primary | ICD-10-CM

## 2023-01-16 LAB — B BURGDOR IGG+IGM SER-ACNC: >8 AI

## 2023-01-16 NOTE — TELEPHONE ENCOUNTER
Patient is looking to get a lipid panel done. The one she already has placed in her chart, the expected date isn't until may 2023. I explained to patient, her insurance may not be able to cover it and she is okay to do it out of pocket. If the date can be changed for patient.

## 2023-01-18 ENCOUNTER — TELEPHONE (OUTPATIENT)
Dept: INTERNAL MEDICINE CLINIC | Facility: CLINIC | Age: 73
End: 2023-01-18

## 2023-01-18 DIAGNOSIS — A69.20 LYME DISEASE: Primary | ICD-10-CM

## 2023-01-18 LAB
B BURGDOR IGG PATRN SER IB-IMP: POSITIVE
B BURGDOR IGM PATRN SER IB-IMP: NEGATIVE
B BURGDOR18KD IGG SER QL IB: PRESENT
B BURGDOR23KD IGG SER QL IB: ABNORMAL
B BURGDOR23KD IGM SER QL IB: ABNORMAL
B BURGDOR28KD IGG SER QL IB: ABNORMAL
B BURGDOR30KD IGG SER QL IB: ABNORMAL
B BURGDOR39KD IGG SER QL IB: PRESENT
B BURGDOR39KD IGM SER QL IB: ABNORMAL
B BURGDOR41KD IGG SER QL IB: PRESENT
B BURGDOR41KD IGM SER QL IB: ABNORMAL
B BURGDOR45KD IGG SER QL IB: ABNORMAL
B BURGDOR58KD IGG SER QL IB: PRESENT
B BURGDOR66KD IGG SER QL IB: ABNORMAL
B BURGDOR93KD IGG SER QL IB: PRESENT

## 2023-01-18 RX ORDER — DOXYCYCLINE HYCLATE 100 MG/1
100 CAPSULE ORAL EVERY 12 HOURS SCHEDULED
Qty: 40 CAPSULE | Refills: 0 | Status: SHIPPED | OUTPATIENT
Start: 2023-01-18 | End: 2023-02-07

## 2023-01-18 NOTE — TELEPHONE ENCOUNTER
----- Message from María Monsivais MD sent at 1/18/2023  5:32 PM EST -----  Can you please call the patient and inform her that her Lyme disease test was positive and she needs to be treated for 20 days  Antibiotic rx was sent to [facility] pharmacy      Dr Muna Newberry

## 2023-01-27 ENCOUNTER — HOSPITAL ENCOUNTER (OUTPATIENT)
Dept: CT IMAGING | Facility: HOSPITAL | Age: 73
End: 2023-01-27

## 2023-01-27 DIAGNOSIS — I65.29 STENOSIS OF CAROTID ARTERY, UNSPECIFIED LATERALITY: ICD-10-CM

## 2023-01-27 RX ADMIN — IOHEXOL 85 ML: 350 INJECTION, SOLUTION INTRAVENOUS at 15:39

## 2023-02-09 ENCOUNTER — OFFICE VISIT (OUTPATIENT)
Dept: OBGYN CLINIC | Facility: CLINIC | Age: 73
End: 2023-02-09

## 2023-02-09 VITALS
WEIGHT: 162 LBS | DIASTOLIC BLOOD PRESSURE: 60 MMHG | HEIGHT: 61 IN | SYSTOLIC BLOOD PRESSURE: 134 MMHG | BODY MASS INDEX: 30.58 KG/M2

## 2023-02-09 DIAGNOSIS — Z91.89 GYN EXAM FOR HIGH-RISK MEDICARE PATIENT: Primary | ICD-10-CM

## 2023-02-09 DIAGNOSIS — B97.7 HPV (HUMAN PAPILLOMA VIRUS) INFECTION: ICD-10-CM

## 2023-02-09 RX ORDER — TIMOLOL MALEATE 5 MG/ML
SOLUTION/ DROPS OPHTHALMIC
COMMUNITY
Start: 2022-12-22 | End: 2023-02-09
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
## 2023-02-10 LAB
HPV HR 12 DNA CVX QL NAA+PROBE: POSITIVE
HPV16 DNA CVX QL NAA+PROBE: NEGATIVE
HPV18 DNA CVX QL NAA+PROBE: NEGATIVE

## 2023-02-15 ENCOUNTER — HOSPITAL ENCOUNTER (OUTPATIENT)
Dept: ULTRASOUND IMAGING | Facility: CLINIC | Age: 73
Discharge: HOME | End: 2023-02-15

## 2023-02-15 ENCOUNTER — HOSPITAL ENCOUNTER (OUTPATIENT)
Dept: MAMMOGRAPHY | Facility: CLINIC | Age: 73
Discharge: HOME | End: 2023-02-15

## 2023-02-15 VITALS — BODY MASS INDEX: 31.6 KG/M2 | HEIGHT: 60 IN | WEIGHT: 160.94 LBS

## 2023-02-15 DIAGNOSIS — N63.10 MASS OF RIGHT BREAST, UNSPECIFIED QUADRANT: ICD-10-CM

## 2023-02-16 ENCOUNTER — TELEPHONE (OUTPATIENT)
Dept: OBGYN CLINIC | Facility: CLINIC | Age: 73
End: 2023-02-16

## 2023-02-16 LAB
LAB AP GYN PRIMARY INTERPRETATION: NORMAL
Lab: NORMAL

## 2023-02-16 NOTE — TELEPHONE ENCOUNTER
----- Message from Leona Strickland MD sent at 2/16/2023  2:25 PM EST -----  Please let Miriam Richardson know that her pap is normal but her HPV is again positive  I would plan to repeat her pap and HPV in one year at her annual visit  For now, she should plan to be seen every year  Medicare will cover this because of her positive HPV      Thanks!!

## 2023-03-09 ENCOUNTER — OFFICE VISIT (OUTPATIENT)
Dept: INTERNAL MEDICINE CLINIC | Age: 73
End: 2023-03-09

## 2023-03-09 VITALS
OXYGEN SATURATION: 98 % | HEIGHT: 60 IN | BODY MASS INDEX: 32.65 KG/M2 | WEIGHT: 166.3 LBS | HEART RATE: 62 BPM | SYSTOLIC BLOOD PRESSURE: 132 MMHG | TEMPERATURE: 97.3 F | DIASTOLIC BLOOD PRESSURE: 70 MMHG

## 2023-03-09 DIAGNOSIS — N18.31 STAGE 3A CHRONIC KIDNEY DISEASE (HCC): ICD-10-CM

## 2023-03-09 DIAGNOSIS — K59.09 OTHER CONSTIPATION: Primary | ICD-10-CM

## 2023-03-09 DIAGNOSIS — R10.30 LOWER ABDOMINAL PAIN: ICD-10-CM

## 2023-03-09 DIAGNOSIS — K21.9 GASTROESOPHAGEAL REFLUX DISEASE WITHOUT ESOPHAGITIS: ICD-10-CM

## 2023-03-09 DIAGNOSIS — R10.9 FLANK PAIN: ICD-10-CM

## 2023-03-09 DIAGNOSIS — I47.1 SVT (SUPRAVENTRICULAR TACHYCARDIA) (HCC): ICD-10-CM

## 2023-03-09 DIAGNOSIS — Z78.0 POST-MENOPAUSAL: ICD-10-CM

## 2023-03-09 LAB
SL AMB  POCT GLUCOSE, UA: NORMAL
SL AMB LEUKOCYTE ESTERASE,UA: NORMAL
SL AMB POCT BILIRUBIN,UA: NORMAL
SL AMB POCT BLOOD,UA: NORMAL
SL AMB POCT CLARITY,UA: CLEAR
SL AMB POCT COLOR,UA: YELLOW
SL AMB POCT KETONES,UA: NORMAL
SL AMB POCT NITRITE,UA: NORMAL
SL AMB POCT PH,UA: 5.5
SL AMB POCT SPECIFIC GRAVITY,UA: 1.01
SL AMB POCT URINE PROTEIN: NORMAL
SL AMB POCT UROBILINOGEN: 0.2
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
## 2023-05-04 ENCOUNTER — OFFICE VISIT (OUTPATIENT)
Dept: INTERNAL MEDICINE CLINIC | Facility: CLINIC | Age: 73
End: 2023-05-04

## 2023-05-04 VITALS
BODY MASS INDEX: 30.51 KG/M2 | HEART RATE: 67 BPM | WEIGHT: 161.6 LBS | OXYGEN SATURATION: 98 % | TEMPERATURE: 98 F | DIASTOLIC BLOOD PRESSURE: 64 MMHG | HEIGHT: 61 IN | SYSTOLIC BLOOD PRESSURE: 112 MMHG

## 2023-05-04 DIAGNOSIS — M15.9 PRIMARY OSTEOARTHRITIS INVOLVING MULTIPLE JOINTS: ICD-10-CM

## 2023-05-04 DIAGNOSIS — N18.30 STAGE 3 CHRONIC KIDNEY DISEASE, UNSPECIFIED WHETHER STAGE 3A OR 3B CKD (HCC): ICD-10-CM

## 2023-05-04 DIAGNOSIS — Z00.00 MEDICARE ANNUAL WELLNESS VISIT, SUBSEQUENT: ICD-10-CM

## 2023-05-04 DIAGNOSIS — M85.80 OSTEOPENIA, UNSPECIFIED LOCATION: ICD-10-CM

## 2023-05-04 DIAGNOSIS — K21.9 GASTROESOPHAGEAL REFLUX DISEASE WITHOUT ESOPHAGITIS: ICD-10-CM

## 2023-05-04 DIAGNOSIS — I10 ESSENTIAL HYPERTENSION: Primary | ICD-10-CM

## 2023-05-04 PROBLEM — U07.1 COVID: Status: RESOLVED | Noted: 2022-04-05 | Resolved: 2023-05-04
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
## 2023-05-04 PROBLEM — R19.4 CHANGE IN BOWEL HABITS: Status: RESOLVED | Noted: 2022-11-03 | Resolved: 2023-05-04

## 2023-05-04 PROBLEM — R07.89 CHEST PRESSURE: Status: RESOLVED | Noted: 2021-03-26 | Resolved: 2023-05-04

## 2023-05-04 PROBLEM — H02.135 SENILE ECTROPION OF LEFT LOWER EYELID: Status: ACTIVE | Noted: 2023-05-04

## 2023-05-04 PROBLEM — R21 RASH AND NONSPECIFIC SKIN ERUPTION: Status: RESOLVED | Noted: 2021-08-19 | Resolved: 2023-05-04

## 2023-05-04 PROBLEM — S40.862A TICK BITE OF LEFT UPPER ARM: Status: RESOLVED | Noted: 2022-12-15 | Resolved: 2023-05-04

## 2023-05-04 PROBLEM — H04.559 ACQUIRED NASOLACRIMAL DUCT STENOSIS: Status: ACTIVE | Noted: 2023-05-04

## 2023-05-04 PROBLEM — H02.132 SENILE ECTROPION OF RIGHT LOWER EYELID: Status: ACTIVE | Noted: 2023-05-04

## 2023-05-04 NOTE — ASSESSMENT & PLAN NOTE
Patient is up-to-date with age-appropriate screenings  She will obtain a DEXA scan as well as mammography    She does not wish to have any additional vaccinations

## 2023-05-04 NOTE — ASSESSMENT & PLAN NOTE
Lab Results   Component Value Date    EGFR 50 07/11/2021    EGFR 45 04/01/2021    EGFR 43 03/25/2021    CREATININE 1 10 07/11/2021    CREATININE 1 21 (H) 04/01/2021    CREATININE 1 24 03/25/2021
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
## 2023-06-16 ENCOUNTER — TELEPHONE (OUTPATIENT)
Dept: INTERNAL MEDICINE CLINIC | Facility: CLINIC | Age: 73
End: 2023-06-16
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
## 2023-06-16 DIAGNOSIS — S10.86XA TICK BITE OF OTHER PART OF NECK, INITIAL ENCOUNTER: Primary | ICD-10-CM

## 2023-06-16 RX ORDER — DOXYCYCLINE HYCLATE 100 MG/1
200 CAPSULE ORAL ONCE
Qty: 2 CAPSULE | Refills: 0 | Status: SHIPPED | OUTPATIENT
Start: 2023-06-16 | End: 2023-06-16

## 2023-06-16 NOTE — TELEPHONE ENCOUNTER
Patient found a neck on her neck today that has been imbedded x 2 days  She has a red jamee on her neck and would like treatment for Lyme because she has a history of Lyme disease  Please send RX to [facility]  Should she have a blood test for Lyme done?

## 2023-07-03 PROBLEM — Z00.00 MEDICARE ANNUAL WELLNESS VISIT, SUBSEQUENT: Status: RESOLVED | Noted: 2020-08-14 | Resolved: 2023-07-03

## 2023-07-17 ENCOUNTER — OFFICE VISIT (OUTPATIENT)
Dept: INTERNAL MEDICINE CLINIC | Age: 73
End: 2023-07-17
Payer: MEDICARE

## 2023-07-17 VITALS
HEIGHT: 61 IN | HEART RATE: 75 BPM | WEIGHT: 158 LBS | BODY MASS INDEX: 29.83 KG/M2 | TEMPERATURE: 98 F | SYSTOLIC BLOOD PRESSURE: 134 MMHG | DIASTOLIC BLOOD PRESSURE: 70 MMHG | OXYGEN SATURATION: 99 %
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
## 2023-07-17 DIAGNOSIS — M62.838 CERVICAL PARASPINAL MUSCLE SPASM: ICD-10-CM

## 2023-07-17 DIAGNOSIS — S20.462A TICK BITE OF LEFT BACK WALL OF THORAX, INITIAL ENCOUNTER: ICD-10-CM

## 2023-07-17 DIAGNOSIS — M54.2 NECK PAIN: Primary | ICD-10-CM

## 2023-07-17 DIAGNOSIS — S16.1XXA STRAIN OF NECK MUSCLE, INITIAL ENCOUNTER: ICD-10-CM

## 2023-07-17 DIAGNOSIS — K21.9 GASTROESOPHAGEAL REFLUX DISEASE WITHOUT ESOPHAGITIS: ICD-10-CM

## 2023-07-17 PROCEDURE — 99214 OFFICE O/P EST MOD 30 MIN: CPT | Performed by: INTERNAL MEDICINE

## 2023-07-17 RX ORDER — TRAMADOL HYDROCHLORIDE 50 MG/1
50 TABLET ORAL EVERY 12 HOURS PRN
Qty: 14 TABLET | Refills: 0 | Status: SHIPPED | OUTPATIENT
Start: 2023-07-17

## 2023-07-17 RX ORDER — METHOCARBAMOL 500 MG/1
500 TABLET, FILM COATED ORAL
Qty: 7 TABLET | Refills: 0 | Status: SHIPPED | OUTPATIENT
Start: 2023-07-17

## 2023-07-17 RX ORDER — PREDNISONE 20 MG/1
20 TABLET ORAL DAILY
Qty: 7 TABLET | Refills: 0 | Status: SHIPPED | OUTPATIENT
Start: 2023-07-17

## 2023-07-17 NOTE — LETTER
July 17, 2023     Patient: Claudell Char  YOB: 1950  Date of Visit: 7/17/2023      To Whom it May Concern:    Virgiejeannette Rivera is under my professional care. Jcarlos Armijo was seen in my office on 7/17/2023. Jcarlos Armijo may return to work on 07/24/23 . If you have any questions or concerns, please don't hesitate to call.          Sincerely,          Sho Torres,         CC: No Recipients
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
## 2023-08-15 ENCOUNTER — HOSPITAL ENCOUNTER (OUTPATIENT)
Dept: MRI IMAGING | Facility: HOSPITAL | Age: 73
Discharge: HOME | End: 2023-08-15
Payer: MEDICARE

## 2023-08-15 DIAGNOSIS — M47.812 SPONDYLOSIS WITHOUT MYELOPATHY OR RADICULOPATHY, CERVICAL REGION: ICD-10-CM

## 2023-08-15 PROCEDURE — 72141 MRI NECK SPINE W/O DYE: CPT

## 2023-08-15 PROCEDURE — G1004 CDSM NDSC: HCPCS

## 2023-09-12 ENCOUNTER — NURSE TRIAGE (OUTPATIENT)
Age: 73
End: 2023-09-12

## 2023-09-12 NOTE — TELEPHONE ENCOUNTER
Call transferred to triage nursing for patient advice. I spoke with patient and reviewed that since she is not established with gastroenterology I cannot provide advice besides reporting to ER if pain level 9-10. Patient stated issues started Saturday and she has been considering going to ER for evaluation. I advised primary care (she states she left message but has not heard from them yet) or urgent care are other options. Patient is scheduled for new patient visit 9/14/23.

## 2023-09-14 ENCOUNTER — TELEPHONE (OUTPATIENT)
Dept: GASTROENTEROLOGY | Facility: CLINIC | Age: 73
End: 2023-09-14

## 2023-09-14 ENCOUNTER — OFFICE VISIT (OUTPATIENT)
Dept: GASTROENTEROLOGY | Facility: CLINIC | Age: 73
End: 2023-09-14

## 2023-09-14 VITALS
SYSTOLIC BLOOD PRESSURE: 142 MMHG | BODY MASS INDEX: 28.89 KG/M2 | HEART RATE: 63 BPM | DIASTOLIC BLOOD PRESSURE: 64 MMHG | WEIGHT: 153 LBS | HEIGHT: 61 IN

## 2023-09-14 DIAGNOSIS — R13.19 ESOPHAGEAL DYSPHAGIA: ICD-10-CM

## 2023-09-14 DIAGNOSIS — Z12.11 ENCOUNTER FOR COLORECTAL CANCER SCREENING: ICD-10-CM

## 2023-09-14 DIAGNOSIS — K21.9 GASTROESOPHAGEAL REFLUX DISEASE WITHOUT ESOPHAGITIS: Primary | ICD-10-CM

## 2023-09-14 DIAGNOSIS — Z12.12 ENCOUNTER FOR COLORECTAL CANCER SCREENING: ICD-10-CM
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
## 2023-09-14 NOTE — TELEPHONE ENCOUNTER
Scheduled date of EGD/colonoscopy (as of today): 9/25/23  Physician performing EGD/colonoscopy: Dr. Hasmukh Gagnon  Location of EGD/colonoscopy: [facility]  Desired bowel prep reviewed with patient: miralax w/ dul given at appt  Instructions reviewed with patient by: ls  Clearances:  Faxed Plavix clearance to Dr. Josi Garcia [phone]. Will call their office first thing Monday to make sure received [phone]. Can ask for assistant Cecy or Hannah.

## 2023-09-18 NOTE — TELEPHONE ENCOUNTER
Called Dr. An Hernandez office [phone], spoke to [facility] whom informed to please refax. Will call again tomorrow am if to make sure received.

## 2023-09-19 NOTE — TELEPHONE ENCOUNTER
Spoke to Dr. Elisabet Wray office and was informed that pt is scheduled for an appt on 9/21 for Plavix clearance. Will call their office on 9/21 to check on status of clearance. Will call pt to ask her to at least hold morning of 9/21 in case approved for procedure.

## 2023-09-21 NOTE — TELEPHONE ENCOUNTER
Spoke to pt whom informed she just left her appt with Dr. Farooq Metcalf and she is holding her Plavix for 4 days prior to the procedure. She has a copy of the clearance and will bring with her in case Dr. Drea Reyes office does not fax. Will call their office tomorrow to try to have them fax to me if not received today.

## 2023-09-25 ENCOUNTER — ANESTHESIA EVENT (OUTPATIENT)
Dept: GASTROENTEROLOGY | Facility: AMBULATORY SURGERY CENTER | Age: 73
End: 2023-09-25

## 2023-09-25 ENCOUNTER — ANESTHESIA (OUTPATIENT)
Dept: GASTROENTEROLOGY | Facility: AMBULATORY SURGERY CENTER | Age: 73
End: 2023-09-25

## 2023-09-25 ENCOUNTER — HOSPITAL ENCOUNTER (OUTPATIENT)
Dept: GASTROENTEROLOGY | Facility: AMBULATORY SURGERY CENTER | Age: 73
Discharge: HOME | End: 2023-09-25
Payer: MEDICARE

## 2023-09-25 VITALS
TEMPERATURE: 97.7 F | HEIGHT: 62 IN | DIASTOLIC BLOOD PRESSURE: 74 MMHG | SYSTOLIC BLOOD PRESSURE: 116 MMHG | RESPIRATION RATE: 18 BRPM | OXYGEN SATURATION: 98 % | BODY MASS INDEX: 28.34 KG/M2 | WEIGHT: 154 LBS | HEART RATE: 68 BPM

## 2023-09-25 DIAGNOSIS — Z12.12 ENCOUNTER FOR COLORECTAL CANCER SCREENING: ICD-10-CM

## 2023-09-25 DIAGNOSIS — R10.10 PAIN OF UPPER ABDOMEN: Primary | ICD-10-CM

## 2023-09-25 DIAGNOSIS — K21.9 GASTROESOPHAGEAL REFLUX DISEASE WITHOUT ESOPHAGITIS: ICD-10-CM

## 2023-09-25 DIAGNOSIS — R13.19 ESOPHAGEAL DYSPHAGIA: ICD-10-CM

## 2023-09-25 DIAGNOSIS — Z12.11 ENCOUNTER FOR COLORECTAL CANCER SCREENING: ICD-10-CM

## 2023-09-25 PROCEDURE — 88313 SPECIAL STAINS GROUP 2: CPT | Performed by: STUDENT IN AN ORGANIZED HEALTH CARE EDUCATION/TRAINING PROGRAM

## 2023-09-25 PROCEDURE — 43239 EGD BIOPSY SINGLE/MULTIPLE: CPT | Performed by: INTERNAL MEDICINE

## 2023-09-25 PROCEDURE — G0121 COLON CA SCRN NOT HI RSK IND: HCPCS | Performed by: INTERNAL MEDICINE

## 2023-09-25 PROCEDURE — 88305 TISSUE EXAM BY PATHOLOGIST: CPT | Performed by: STUDENT IN AN ORGANIZED HEALTH CARE EDUCATION/TRAINING PROGRAM

## 2023-09-25 RX ORDER — PROPOFOL 10 MG/ML
INJECTION, EMULSION INTRAVENOUS AS NEEDED
Status: DISCONTINUED | OUTPATIENT
Start: 2023-09-25 | End: 2023-09-25

## 2023-09-25 RX ORDER — LIDOCAINE HYDROCHLORIDE 20 MG/ML
INJECTION, SOLUTION EPIDURAL; INFILTRATION; INTRACAUDAL; PERINEURAL AS NEEDED
Status: DISCONTINUED | OUTPATIENT
Start: 2023-09-25 | End: 2023-09-25

## 2023-09-25 RX ORDER — SODIUM CHLORIDE, SODIUM LACTATE, POTASSIUM CHLORIDE, CALCIUM CHLORIDE 600; 310; 30; 20 MG/100ML; MG/100ML; MG/100ML; MG/100ML
INJECTION, SOLUTION INTRAVENOUS CONTINUOUS PRN
Status: DISCONTINUED | OUTPATIENT
Start: 2023-09-25 | End: 2023-09-25

## 2023-09-25 RX ADMIN — PROPOFOL 20 MG: 10 INJECTION, EMULSION INTRAVENOUS at 09:20

## 2023-09-25 RX ADMIN — PROPOFOL 20 MG: 10 INJECTION, EMULSION INTRAVENOUS at 09:34

## 2023-09-25 RX ADMIN — PROPOFOL 20 MG: 10 INJECTION, EMULSION INTRAVENOUS at 09:28

## 2023-09-25 RX ADMIN — PROPOFOL 20 MG: 10 INJECTION, EMULSION INTRAVENOUS at 09:24

## 2023-09-25 RX ADMIN — PROPOFOL 60 MG: 10 INJECTION, EMULSION INTRAVENOUS at 09:39

## 2023-09-25 RX ADMIN — PROPOFOL 60 MG: 10 INJECTION, EMULSION INTRAVENOUS at 09:14

## 2023-09-25 RX ADMIN — PROPOFOL 20 MG: 10 INJECTION, EMULSION INTRAVENOUS at 09:16

## 2023-09-25 RX ADMIN — SODIUM CHLORIDE, SODIUM LACTATE, POTASSIUM CHLORIDE, CALCIUM CHLORIDE: 600; 310; 30; 20 INJECTION, SOLUTION INTRAVENOUS at 08:59

## 2023-09-25 RX ADMIN — LIDOCAINE HYDROCHLORIDE 80 MG: 20 INJECTION, SOLUTION EPIDURAL; INFILTRATION; INTRACAUDAL; PERINEURAL at 09:14

## 2023-09-25 NOTE — DISCHARGE SUMMARY
Discharge Summary - Alanna Sanchez 74 y.o. female MRN: [id number]    Unit/Bed#:  Encounter: [id number]    Admission Date: 9/25/2023    Admitting Diagnosis: Encounter for colorectal cancer screening [Z12.11, Z12.12]  Gastroesophageal reflux disease without esophagitis [K21.9]  Esophageal dysphagia [R13.19]    HPI: Screening colonoscopy. History of reflux and occasional dysphagia    Procedures Performed: No orders of the defined types were placed in this encounter. Summary of Hospital Course: Tolerated procedure well    Significant Findings, Care, Treatment and Services Provided: Normal endoscopy and colonoscopy. Difficulty swallowing from dry mouth likely    Complications: None    Discharge Diagnosis: See above    Medical Problems     Resolved Problems  Date Reviewed: 7/17/2023   None         Condition at Discharge: good         Discharge instructions/Information to patient and family:   See after visit summary for information provided to patient and family. Provisions for Follow-Up Care:  See after visit summary for information related to follow-up care and any pertinent home health orders.       PCP: Daniel Cleary MD    Disposition: Home

## 2023-09-25 NOTE — ANESTHESIA PREPROCEDURE EVALUATION
Procedure:  COLONOSCOPY  EGD    Relevant Problems   CARDIO   (+) Amaurosis fugax   (+) Coronary artery disease involving native coronary artery of native heart   (+) Essential hypertension   (+) Hyperlipidemia   (+) SVT (supraventricular tachycardia)      GI/HEPATIC   (+) Esophageal dysphagia   (+) GERD (gastroesophageal reflux disease)      /RENAL   (+) Stage 3 chronic kidney disease, unspecified whether stage 3a or 3b CKD (HCC)      HEMATOLOGY   (+) Anemia      MUSCULOSKELETAL   (+) Arthritis of shoulder   (+) Primary osteoarthritis involving multiple joints      NEURO/PSYCH   (+) Amaurosis fugax      PULMONARY   (+) Shortness of breath      Respiratory   (+) Right lower lobe pulmonary nodule      Endocrine   (+) Thyroid nodule      Musculoskeletal and Integument   (+) Closed fracture of third thoracic vertebra (HCC)   (+) Full thickness rotator cuff tear   (+) Injury of tendon of rotator cuff   (+) Osteopenia   (+) Pityriasis versicolor      Other   (+) Acquired nasolacrimal duct stenosis   (+) Dysuria   (+) Flank pain   (+) Lyme disease   (+) Other constipation   (+) Palpitation   (+) Post-menopausal   (+) Senile ectropion of left lower eyelid   (+) Senile ectropion of right lower eyelid      Lab Results   Component Value Date    SODIUM 141 07/11/2021    K 4.9 07/11/2021     07/11/2021    CO2 24 07/11/2021    AGAP 10 07/11/2021    BUN 26 (H) 07/11/2021    CREATININE 1.10 07/11/2021    GLUC 105 07/11/2021    GLUF 96 02/25/2021    CALCIUM 8.8 07/11/2021    AST 20 07/11/2021    ALT 34 07/11/2021    ALKPHOS 117 (H) 07/11/2021    TP 7.3 07/11/2021    TBILI 0.40 07/11/2021    EGFR 50 07/11/2021     Lab Results   Component Value Date    WBC 8.19 07/11/2021    HGB 12.6 07/11/2021    HCT 38.2 07/11/2021    MCV 87 07/11/2021     07/11/2021              Anesthesia Plan  ASA Score- 3     Anesthesia Type- IV sedation with anesthesia with ASA Monitors.          Additional Monitors:   Airway Plan:           Plan Factors-Exercise tolerance (METS): >4 METS. Chart reviewed. EKG reviewed. Imaging results reviewed. Existing labs reviewed. Patient summary reviewed. Induction- intravenous.     Postoperative Plan-     Informed Consent-

## 2023-09-25 NOTE — INTERVAL H&P NOTE
H&P reviewed. After examining the patient I find no changes in the patients condition since the H&P had been written.     Vitals:    09/25/23 0905   BP: 142/82   Pulse: 65   Resp: 18   Temp: 97.7 °F (36.5 °C)   SpO2: 97%

## 2023-09-25 NOTE — ANESTHESIA POSTPROCEDURE EVALUATION
Post-Op Assessment Note    CV Status:  Stable    Pain management: adequate     Mental Status:  Alert and awake   Hydration Status:  Euvolemic   PONV Controlled:  Controlled   Airway Patency:  Patent      Post Op Vitals Reviewed: Yes      Staff: CRNA         No notable events documented.     BP   117/60   Temp      Pulse  74   Resp   16   SpO2   98

## 2023-09-29 PROCEDURE — 88305 TISSUE EXAM BY PATHOLOGIST: CPT | Performed by: STUDENT IN AN ORGANIZED HEALTH CARE EDUCATION/TRAINING PROGRAM

## 2023-09-29 PROCEDURE — 88313 SPECIAL STAINS GROUP 2: CPT | Performed by: STUDENT IN AN ORGANIZED HEALTH CARE EDUCATION/TRAINING PROGRAM

## 2023-10-10 ENCOUNTER — HOSPITAL ENCOUNTER (OUTPATIENT)
Dept: ULTRASOUND IMAGING | Facility: HOSPITAL | Age: 73
Discharge: HOME | End: 2023-10-10
Attending: INTERNAL MEDICINE
Payer: MEDICARE

## 2023-10-10 DIAGNOSIS — R10.10 PAIN OF UPPER ABDOMEN: ICD-10-CM

## 2023-10-10 PROCEDURE — 76700 US EXAM ABDOM COMPLETE: CPT

## 2023-11-27 ENCOUNTER — OFFICE VISIT (OUTPATIENT)
Age: 73
End: 2023-11-27
Payer: MEDICARE

## 2023-11-27 VITALS
BODY MASS INDEX: 28.89 KG/M2 | TEMPERATURE: 97.4 F | HEIGHT: 62 IN | OXYGEN SATURATION: 99 % | SYSTOLIC BLOOD PRESSURE: 118 MMHG | WEIGHT: 157 LBS | DIASTOLIC BLOOD PRESSURE: 70 MMHG | HEART RATE: 70 BPM

## 2023-11-27 DIAGNOSIS — M25.562 ACUTE PAIN OF LEFT KNEE: Primary | ICD-10-CM

## 2023-11-27 DIAGNOSIS — Z12.31 ENCOUNTER FOR SCREENING MAMMOGRAM FOR MALIGNANT NEOPLASM OF BREAST: ICD-10-CM

## 2023-11-27 PROCEDURE — 99213 OFFICE O/P EST LOW 20 MIN: CPT

## 2023-11-27 RX ORDER — NEOMYCIN POLYMYXIN B SULFATES AND DEXAMETHASONE 3.5; 10000; 1 MG/ML; [USP'U]/ML; MG/ML
SUSPENSION/ DROPS OPHTHALMIC
COMMUNITY
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
## 2023-11-27 NOTE — ASSESSMENT & PLAN NOTE
Present patient presenting with left knee pain x 1 month. Pain located over lateral posterior knee. No known injury  Will order x-ray message patient with results once obtained  Advised patient to continue with Tylenol 500 mg every 6 hours as needed for pain  Patient can try diclofenac cream-apply topically up to 4 times daily.   Avoid contact with face or mouth  Referral to PT sent   Will consider orthopedic referral if pain is not improving and x-ray is unremarkable

## 2023-11-27 NOTE — PATIENT INSTRUCTIONS
Tylenol 500mg every 6 hours as needed for pain  Diclofenac cream, apply to area 4 times per day-avoid contact with face and mouth  Go for xray
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
## 2024-01-29 ENCOUNTER — TELEPHONE (OUTPATIENT)
Age: 74
End: 2024-01-29

## 2024-01-29 NOTE — TELEPHONE ENCOUNTER
Pt spouse was looking to schedule an consultation appt with Dr. Hopkins no appts until March.Pt spouse will be calling Ortho or pt's PCP.

## 2024-02-01 ENCOUNTER — TELEPHONE (OUTPATIENT)
Dept: PAIN MEDICINE | Facility: CLINIC | Age: 74
End: 2024-02-01

## 2024-02-01 ENCOUNTER — OFFICE VISIT (OUTPATIENT)
Dept: PAIN MEDICINE | Facility: CLINIC | Age: 74
End: 2024-02-01
Payer: MEDICARE

## 2024-02-01 VITALS
WEIGHT: 159 LBS | BODY MASS INDEX: 29.08 KG/M2 | HEART RATE: 88 BPM | SYSTOLIC BLOOD PRESSURE: 150 MMHG | DIASTOLIC BLOOD PRESSURE: 72 MMHG

## 2024-02-01 DIAGNOSIS — M54.16 LUMBAR RADICULOPATHY: Primary | ICD-10-CM

## 2024-02-01 DIAGNOSIS — I25.10 CORONARY ARTERY DISEASE INVOLVING NATIVE CORONARY ARTERY OF NATIVE HEART WITHOUT ANGINA PECTORIS: ICD-10-CM

## 2024-02-01 DIAGNOSIS — M46.1 SACROILIITIS (HCC): ICD-10-CM

## 2024-02-01 DIAGNOSIS — G89.4 CHRONIC PAIN SYNDROME: ICD-10-CM

## 2024-02-01 DIAGNOSIS — N18.30 STAGE 3 CHRONIC KIDNEY DISEASE, UNSPECIFIED WHETHER STAGE 3A OR 3B CKD (HCC): ICD-10-CM

## 2024-02-01 PROCEDURE — 99204 OFFICE O/P NEW MOD 45 MIN: CPT | Performed by: STUDENT IN AN ORGANIZED HEALTH CARE EDUCATION/TRAINING PROGRAM

## 2024-02-01 RX ORDER — GABAPENTIN 100 MG/1
100 CAPSULE ORAL 2 TIMES DAILY
Qty: 60 CAPSULE | Refills: 0 | Status: SHIPPED | OUTPATIENT
Start: 2024-02-01 | End: 2024-03-02

## 2024-02-01 NOTE — TELEPHONE ENCOUNTER
Caller: Mirna     Doctor: Dr aMcias     Reason for call: Patient called stating she has an appointment tomorrow Cardio and is not going to take Plavix in case she can get procedure done on the 9th instead and would confirm with Cardio  tomorrow and advise     Call back#: [phone]

## 2024-02-01 NOTE — TELEPHONE ENCOUNTER
S/w pt, she is going to see Cardiology tomorrow and is going to discuss the Plavix hold, and if she can hold it, she will have them send the form back. Pt said she is also going for an eye appt in [facility] on 2/15 and she is not sure what they use in her injections, but she will call back once she knows. She is hoping to proceed with her ALEX on 2/9.
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
## 2024-02-01 NOTE — TELEPHONE ENCOUNTER
Scheduled patient for LESI 02/16/2024  Patient is taking plavix (hold order faxed to dr. Curran's office at [phone])  Nothing to eat or drink 1 hour prior to procedure  Needs to arrange transportation  Proper clothing for procedure  No vaccines 2 weeks prior or after procedure  If ill or place on antibiotics, please call to reschedule
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
## 2024-02-01 NOTE — PATIENT INSTRUCTIONS
-Treat Your Own Back By Norah  Core Strengthening Exercises   WHAT YOU NEED TO KNOW:   Your core includes the muscles of your lower back, hip, pelvis, and abdomen. Core strengthening exercises help heal and strengthen these muscles. This helps prevent another injury, and keeps your pelvis, spine, and hips in the correct position.  DISCHARGE INSTRUCTIONS:   Call your doctor or physical therapist if:   You have sharp or worsening pain during exercise or at rest.    You have questions or concerns about your shoulder exercises.    Safety tips:  Talk to your healthcare provider before you start an exercise program. A physical therapist can teach you how to do core strengthening exercises safely.  Do the exercises on a mat or firm surface.  A firm surface will support your spine and prevent low back pain. Do not do these exercises on a bed.    Move slowly and smoothly.  Avoid fast or jerky motions.    Stop if you feel pain.  You may feel some discomfort at first, but you should not feel pain. Tell your provider or physical therapist if you have pain while you exercise. Regular exercise will help decrease your discomfort over time.    Breathe normally during core exercises.  Do not hold your breath. This may cause an increase in blood pressure and prevent muscle strengthening. Your healthcare provider will tell you when to inhale and exhale during the exercise.    Begin all of your exercises with abdominal bracing.  Abdominal bracing helps warm up your core muscles. You can also practice abdominal bracing throughout the day. Lie on your back with your knees bent and feet flat on the floor. Place your arms in a relaxed position beside your body. Tighten your abdominal muscles. Pull your belly button in and up toward your spine. Hold for 5 seconds. Relax your muscles. Repeat 10 times.       Core strengthening exercises:  Your healthcare provider will tell you how often to do these exercises. The provider will also tell  you how many repetitions of each exercise you should do. Hold each exercise for 5 seconds or as directed. As you get stronger, increase your hold to 10 to 15 seconds. You can do some of these exercises on a stability ball, or with a weight. Ask your healthcare provider how to use a stability ball or weight for these exercises:  Bridging:  Lie on your back with your knees bent and feet flat on the floor. Rest your arms at your side. Tighten your buttocks, and then lift your hips 1 inch off the floor. Hold for 5 seconds. When you can do this exercise without pain for 10 seconds, increase the distance you lift your hips. A good goal is to be able to lift your hips so that your shoulders, hips, and knees are in a straight line.         Dead bug:  Lie on your back with your knees bent and feet flat on the floor. Place your arms in a relaxed position beside your body. Begin with abdominal bracing. Next, raise one leg, keeping your knee bent. Hold for 5 seconds. Repeat with the other leg. When you can do this exercise without pain for 10 to 15 seconds, you may raise one straight leg and hold. Repeat with the other leg.         Quadruped:  Place your hands and knees on the floor. Keep your wrists directly below your shoulders and your knees directly below your hips. Pull your belly button in toward your spine. Do not flatten or arch your back. Tighten your abdominal muscles below your belly button. Hold for 5 seconds. When you can do this exercise without pain for 10 to 15 seconds, you may extend one arm and hold. Repeat on the other side.         Side bridge exercises:      Standing side bridge:  Stand next to a wall and extend one arm toward the wall. Place your palm flat on the wall with your fingers pointing upward. Begin with abdominal bracing. Next, without moving your feet, slowly bend your arm to 90 degrees. Hold for 5 seconds. Repeat on the other side. When you can do this exercise without pain for 10 to 15  seconds, you may do the bent leg side bridge on the floor.         Bent leg side bridge:  Lie on one side with your legs, hips, and shoulders in a straight line. Prop yourself up onto your forearm so your elbow is directly below your shoulder. Bend your knees back to 90 degrees. Begin with abdominal bracing. Next, lift your hips and balance yourself on your forearm and knees. Hold for 5 seconds. Repeat on the other side. When you can do this exercise without pain for 10 to 15 seconds, you may do the straight leg side bridge on the floor.         Straight leg side bridge:  Lie on one side with your legs, hips, and shoulders in a straight line. Prop yourself up onto your forearm so your elbow is directly below your shoulder. Begin with abdominal bracing. Lift your hips off the floor and balance yourself on your forearm and the outside of your flexed foot. Do not let your ankle bend sideways. Hold for 5 seconds. Repeat on the other side. When you can do this exercise without pain for 10 to 15 seconds, ask your healthcare provider for more advanced exercises.       Superman:  Lie on your stomach. Extend your arms forward on the floor. Tighten your abdominal muscles and lift your right hand and left leg off the floor. Hold this position. Slowly return to the starting position. Tighten your abdominal muscles and lift your left hand and right leg off the floor. Hold this position. Slowly return to the starting position.         Clam:  Lie on your side with your knees bent. Put your bottom arm under your head to keep your neck in line. Put your top hand on your hip to keep your pelvis from moving. Put your heels together, and keep them together during this exercise. Slowly raise your top knee toward the ceiling. Then lower your leg so your knees are together. Repeat this exercise 10 times. Then switch sides and do the exercise 10 times with the other leg.         Curl up:  Lie on your back with your knees bent and feet flat  on the floor. Place your hands, palms down, underneath your lower back. Next, with your elbows on the floor, lift your shoulders and chest 2 to 3 inches off the floor. Keep your head in line with your shoulders. Hold this position. Slowly return to the starting position.         Straight leg raises:  Lie on your back with one leg straight. Bend the other knee and place your foot flat on the floor. Tighten your abdominal muscles. Keep your leg straight and slowly lift it straight up 6 to 12 inches off the floor. Hold this position. Lower your leg slowly. Do as many repetitions as directed on this side. Repeat with the other leg.         Plank:  Lie on your stomach. Bend your elbows and place your forearms flat on the floor. Lift your chest, stomach, and knees off the floor. Make sure your elbows are below your shoulders. Your body should be in a straight line. Do not let your hips or lower back sink to the ground. Squeeze your abdominal muscles together and hold for 15 seconds. To make this exercise harder, hold for 30 seconds or lift 1 leg at a time.         Bicycles:  Lie on your back. Bend both knees and bring them toward your chest. Your calves should be parallel to the floor. Place the palms of your hands on the back of your head. Straighten your right leg and keep it lifted 2 inches off the floor. Raise your head and shoulders off the floor and twist towards your left. Keep your head and shoulders lifted. Bend your right knee while you straighten your left leg. Keep your left leg 2 inches off the floor. Twist your head and chest towards the left leg. Continue to straighten 1 leg at a time and twist.       Follow up with your doctor or physical therapist as directed:  Write down your questions so you remember to ask them during your visits.  © Copyright [employer] 2023 Information is for End User's use only and may not be sold, redistributed or otherwise used for commercial purposes.  The above information is an   only. It is not intended as medical advice for individual conditions or treatments. Talk to your doctor, nurse or pharmacist before following any medical regimen to see if it is safe and effective for you.

## 2024-02-01 NOTE — TELEPHONE ENCOUNTER
Spoke with patient, advised that I did fax over the hold order to Dr. Sarmiento office and am awaiting a response on the Plavix hold. Patient advised that she will call over to office as she does want to get in for injection on 02/09 not 02/16.

## 2024-02-01 NOTE — TELEPHONE ENCOUNTER
Caller: Patient     Doctor: Gilberto     Reason for call: Patient calling to see if they ever got a hold of her doctors office      Call back#: [phone]

## 2024-02-02 NOTE — TELEPHONE ENCOUNTER
S/w Pt, Pt approved for Plavix 7 day hold - Pt stopped Plavix on 02/01 for 02/09/24 procedure. Pt asking if she can be moved back to the 9th as she has a scheduling conflict with 02/16/24. Pt was not aware injection date had been moved. Please advise.

## 2024-02-02 NOTE — TELEPHONE ENCOUNTER
Hold order received from Dr. Curran's office, please call patient with hold dates for procedure on 02/16.

## 2024-02-02 NOTE — TELEPHONE ENCOUNTER
Spoke with patient, advised that her procedure was scheduled for 2/16, we were waiting on the hold order to see if we could move her up to 2/9. Advised patient that since we have now received the hold order we will move her procedure up to 2/9, patient verbalized understanding and appreciative of call.

## 2024-02-05 ENCOUNTER — TELEPHONE (OUTPATIENT)
Dept: OBGYN CLINIC | Facility: HOSPITAL | Age: 74
End: 2024-02-05

## 2024-02-05 NOTE — TELEPHONE ENCOUNTER
Patient called in regarding up coming procedure.   Attempted to warm transfer  and call would not go through.    Patient transferred

## 2024-02-06 ENCOUNTER — EVALUATION (OUTPATIENT)
Dept: PHYSICAL THERAPY | Facility: CLINIC | Age: 74
End: 2024-02-06
Payer: MEDICARE

## 2024-02-06 DIAGNOSIS — M54.16 LUMBAR RADICULOPATHY: Primary | ICD-10-CM

## 2024-02-06 PROCEDURE — 97161 PT EVAL LOW COMPLEX 20 MIN: CPT | Performed by: PHYSICAL THERAPIST

## 2024-02-06 PROCEDURE — 97110 THERAPEUTIC EXERCISES: CPT | Performed by: PHYSICAL THERAPIST

## 2024-02-06 NOTE — PROGRESS NOTES
PT Evaluation     Today's date: 2024  Patient name: Mirna Boswell  : 1950  MRN: [id number]  Referring provider: Aaron Macias MD  Dx:   Encounter Diagnosis     ICD-10-CM    1. Lumbar radiculopathy  M54.16 Ambulatory referral to Physical Therapy          Start Time: 1100  Stop Time: 1145  Total time in clinic (min): 45 minutes    Assessment  Assessment details: Mirna Boswell is a 73 y.o. female who presents with complaints of Lumbar radiculopathy  (primary encounter diagnosis).  No further referral appears necessary at this time based upon examination results.  Patient is presenting with decreased lumbar extension and postural dysfunction leading to limitations with bending, walking, sitting, driving, sleeping, working, and performing ADLs. Prognosis is good given HEP compliance and PT 1-2x/wk.  Positive prognostic indicators include positive attitude toward recovery.   Please contact me if you have any questions or recommendations.  Thank you for the opportunity to share in Mirna's care.       Impairments: abnormal gait, abnormal muscle firing, abnormal or restricted ROM, abnormal movement, impaired physical strength, lacks appropriate home exercise program, pain with function and poor posture     Symptom irritability: moderateUnderstanding of Dx/Px/POC: good   Prognosis: good    Plan  Patient would benefit from: skilled physical therapy  Planned therapy interventions: joint mobilization, manual therapy, patient education, postural training, strengthening, stretching, therapeutic activities, therapeutic exercise, home exercise program, neuromuscular re-education, flexibility, functional ROM exercises and abdominal trunk stabilization  Frequency: 1-2x.  Duration in weeks: 8  Treatment plan discussed with: patient        Subjective Evaluation    History of Present Illness  Mechanism of injury: Patient reports years ago fall from horse and hurt her back (about 20 years ago). Reports recently having fall  on ice suffering fracture that has improved. Reports central low back pain that can radiate down her right leg. Reports pain in bilateral gluteal region with sleeping and rolling in bed. Knife-like sensation down to right knee, central low back described as sharp and achy. Sitting helps slightly. Standing tolerance about 30 minutes. Painful with ambulation at times that can vary. Variable symptom presentation in right leg. Patient denies bowel/bladder dysfunction, saddle paraesthesias, nor cough/sneeze provocation. Sitting in car will cause increase in soreness as well as getting out of car. Sleep disturbances reported. Bending and returning upright increases lumbar discomfort.   Patient is out of work right now. Patient is scheduled for injection in 3 days.               Recurrent probem    Quality of life: good    Patient Goals  Patient goals for therapy: decreased pain, increased motion, increased strength and independence with ADLs/IADLs    Pain  Current pain ratin  At worst pain rating: 10  Quality: sharp and knife-like  Aggravating factors: sitting, stair climbing, standing and walking  Progression: improved    Treatments  Previous treatment: medication        Short Term:  Pt will report decreased levels of pain by at least 2 subjective ratings in 4 weeks  Pt will demonstrate improved ROM by at least 10 degrees in 4 weeks  Pt will demonstrate improved strength by 1/2 grade in 4 weeks.  Pt will be able to sleep without disturbances in 4 weeks  Pt will be able to drive> 15 minutes without pain in 4 weeks.  Long Term:   Pt will be independent in their HEP in 8 weeks  Pt will be pain free with IADL's in 8 weeks.  Pt will demonstrate full pain free lumbar AROM in 8 weeks.  Pt will be able to sit> 30 minutes without pain in 8 weeks.  Pt will be able to rise from seated position without pain in 8 weeks     Objective  GAIT: forward trunk lean  Squat assess: WNL slight right LE pain  Heel toe walk: -    Lumbar  %  of normal   Flex. 100p!   Extn. 50   SB Left 75p!   SB Right 50p!   ROT Left 100   ROT Right 75   Repetitive testing: extension in standing= better  extension in lying= better  prone lying= better  flexion standing= worse flexion in lying= worse          MMT    Hip       L       R   Flex. 4 4   Extn. 4 4                 MMT    Knee         L        R   Flex. 5 5   Extn. 5 5              MMT    Ankle       L        R   PF 5 5   DF. 5 5   EHL 5 5   Inv. 5 5   Ever. 5 5     Posture: poor sitting posture, correction with roll improves symptoms  Palpation: L3-5  Slump test: L=  -   R= -      Straight leg raise:   L=   -   R= -             Transverse abdominis: Bent knee fall out= Fair     Hip/SI joint:    Active SLR=  +  Active SLR with stabilization =  -   Joint mobility = decreased Lumbar throughout       Insurance:  AMA/CMS Eval/ Re-eval POC expires FOTO Auth #/ Referral # Total    Start date  Expiration date Extension  Visit limitation?  PT only or  PT+OT? Co-Insurance   CMS 2.6.24 4.2.24                            Precautions: CAD, HTN, osteopenia, lyme, hx of cuff tear, lyme diease  Patient provided verbal consent to treatment plan and recommended interventions.    Manuals 2.6         visit 1                                       Neuro Re-Ed                                                                                Ther Ex          JULIEN 2'         Prone lying 3'         Press up 2*5         Postural ed/roll FB                                                 Ther Activity          Pt edu FB                   Modalities

## 2024-02-09 ENCOUNTER — APPOINTMENT (OUTPATIENT)
Dept: RADIOLOGY | Facility: HOSPITAL | Age: 74
End: 2024-02-09
Payer: MEDICARE

## 2024-02-09 ENCOUNTER — HOSPITAL ENCOUNTER (OUTPATIENT)
Facility: AMBULARY SURGERY CENTER | Age: 74
Setting detail: OUTPATIENT SURGERY
Discharge: HOME | End: 2024-02-09
Attending: STUDENT IN AN ORGANIZED HEALTH CARE EDUCATION/TRAINING PROGRAM | Admitting: STUDENT IN AN ORGANIZED HEALTH CARE EDUCATION/TRAINING PROGRAM
Payer: MEDICARE

## 2024-02-09 VITALS
RESPIRATION RATE: 18 BRPM | DIASTOLIC BLOOD PRESSURE: 81 MMHG | SYSTOLIC BLOOD PRESSURE: 169 MMHG | HEART RATE: 75 BPM | TEMPERATURE: 96.8 F | OXYGEN SATURATION: 98 %

## 2024-02-09 PROBLEM — M54.16 LUMBAR RADICULOPATHY: Status: ACTIVE | Noted: 2024-02-09

## 2024-02-09 PROCEDURE — 62323 NJX INTERLAMINAR LMBR/SAC: CPT | Performed by: STUDENT IN AN ORGANIZED HEALTH CARE EDUCATION/TRAINING PROGRAM

## 2024-02-09 PROCEDURE — NC001 PR NO CHARGE: Performed by: STUDENT IN AN ORGANIZED HEALTH CARE EDUCATION/TRAINING PROGRAM

## 2024-02-09 RX ORDER — BUPIVACAINE HYDROCHLORIDE 2.5 MG/ML
INJECTION, SOLUTION EPIDURAL; INFILTRATION; INTRACAUDAL AS NEEDED
Status: DISCONTINUED | OUTPATIENT
Start: 2024-02-09 | End: 2024-02-09 | Stop reason: HOSPADM

## 2024-02-09 RX ORDER — SODIUM CHLORIDE 9 MG/ML
INJECTION INTRAVENOUS AS NEEDED
Status: DISCONTINUED | OUTPATIENT
Start: 2024-02-09 | End: 2024-02-09 | Stop reason: HOSPADM

## 2024-02-09 RX ORDER — LIDOCAINE HYDROCHLORIDE 10 MG/ML
INJECTION, SOLUTION EPIDURAL; INFILTRATION; INTRACAUDAL; PERINEURAL AS NEEDED
Status: DISCONTINUED | OUTPATIENT
Start: 2024-02-09 | End: 2024-02-09 | Stop reason: HOSPADM

## 2024-02-09 RX ORDER — METHYLPREDNISOLONE ACETATE 80 MG/ML
INJECTION, SUSPENSION INTRA-ARTICULAR; INTRALESIONAL; INTRAMUSCULAR; SOFT TISSUE AS NEEDED
Status: DISCONTINUED | OUTPATIENT
Start: 2024-02-09 | End: 2024-02-09 | Stop reason: HOSPADM

## 2024-02-09 NOTE — OP NOTE
Pre-procedure Diagnosis: Lumbar radiculopathy  Post-procedure Diagnosis: Lumbar radiculopathy  Procedure Title(s):  1.  L4-L5 interlaminar epidural steroid injection      2. Intraoperative fluoroscopy  Attending Surgeon:   Aaron Macias MD  Anesthesia:   Local     Indications: The patient is a 73 y.o. year-old female with a diagnosis of lumbar radiculopathy. The patient's history and physical exam were reviewed.  The risks, benefits and alternatives to the procedure were discussed, and all questions were answered to the patient's satisfaction. The patient agreed to proceed, and written informed consent was obtained.    Procedure in Detail: The patient was brought into the procedure room and placed in the prone position on the fluoroscopy table. The area of the lumbar spine was prepped with chlorhexidine gluconate solution times one and draped in a sterile manner.    The L3-L4 interspace was identified and marked under AP fluoroscopy. The skin and subcutaneous tissues in the area were anesthetized with 1% lidocaine. A 20-gauge Tuohy epidural needle was directed toward the interspace under fluoroscopic guidance until the ligamentum flavum was engaged. After multiple attempts without success, decision was made to switch to the L4-L5 interspace. The above steps were then repeated at this level. From this point, a loss of resistance technique with air was used to identify entrance of the needle into the epidural space. Once an appropriate loss was obtained, negative aspiration was confirmed, and 1 ml Omnipaque 300 contrast solution was injected. An appropriate epidurogram was noted.    Then, after negative aspiration, a solution consisting of 1-mL depo-medrol (80mg/mL) and 1-mL bupivacaine 0.25% and 3-mL preservative-free saline was easily injected. The needle was removed with a 1% lidocaine flush. The patient's back was cleaned and a bandage was placed over the site of needle insertion.    Disposition: The patient  tolerated the procedure well, and there were no apparent complications. The patient was taken to the recovery area where written discharge instructions for the procedure were given.     Estimated Blood Loss: None  Specimens Obtained: N/A

## 2024-02-09 NOTE — DISCHARGE INSTRUCTIONS
Epidural Steroid Injection   WHAT YOU NEED TO KNOW:   An epidural steroid injection (ALEX) is a procedure to inject steroid medicine into the epidural space. The epidural space is between your spinal cord and vertebrae. Steroids reduce inflammation and fluid buildup in your spine that may be causing pain. You may be given pain medicine along with the steroids.          ACTIVITY  Do not drive or operate machinery today.  No strenuous activity today - bending, lifting, etc.  You may resume normal activites starting tomorrow - start slowly and as tolerated.  You may shower today, but no tub baths or hot tubs.  You may have numbness for several hours from the local anesthetic. Please use caution and common sense, especially with weight-bearing activities.    CARE OF THE INJECTION SITE  If you have soreness or pain, apply ice to the area today (20 minutes on/20 minutes off).  Starting tomorrow, you may use warm, moist heat or ice if needed.  You may have an increase or change in your discomfort for 36-48 hours after your treatment.  Apply ice and continue with any pain medication you have been prescribed.  Notify the [facility] if you have any of the following: redness, drainage, swelling, headache, stiff neck or fever above 100°F.    SPECIAL INSTRUCTIONS  Our office will contact you in approximately 7 days for a progress report.    MEDICATIONS  Continue to take all routine medications.  Our office may have instructed you to hold some medications.    As no general anesthesia was used in today's procedure, you should not experience any side effects related to anesthesia.     If you are diabetic, the steroids used in today's injection may temporarily increase your blood sugar levels after the first few days after your injection. Please keep a close eye on your sugars and alert the doctor who manages your diabetes if your sugars are significantly high from your baseline or you are symptomatic.     If you have a  problem specifically related to your procedure, please call our office at [phone].  Problems not related to your procedure should be directed to your primary care physician.

## 2024-02-09 NOTE — INTERVAL H&P NOTE
H&P reviewed. After examining the patient I find no changes in the patients condition since the H&P had been written.    Vitals:    02/09/24 1356   BP: 141/77   Pulse: 69   Resp: 18   Temp: (!) 96.8 °F (36 °C)   SpO2: 97%

## 2024-02-12 ENCOUNTER — APPOINTMENT (OUTPATIENT)
Dept: PHYSICAL THERAPY | Facility: CLINIC | Age: 74
End: 2024-02-12
Payer: MEDICARE

## 2024-02-12 DIAGNOSIS — M54.16 LUMBAR RADICULOPATHY: Primary | ICD-10-CM

## 2024-02-12 NOTE — PROGRESS NOTES
Daily Note     Today's date: 2024  Patient name: Mirna Boswell  : 1950  MRN: [id number]  Referring provider: Aaron Macias MD  Dx:   Encounter Diagnosis     ICD-10-CM    1. Lumbar radiculopathy  M54.16                      Subjective: ***    Objective: See treatment diary below    Assessment: Tolerated treatment {Tolerated treatment :1638858069}. Patient {assessment:1422486021}    Plan: {PLAN:[id number]}     Insurance:  AMA/CMS Eval/ Re-eval POC expires FOTO Auth #/ Referral # Total    Start date  Expiration date Extension  Visit limitation?  PT only or  PT+OT? Co-Insurance   CMS 2.6.24 4.2.24                            Precautions: CAD, HTN, osteopenia, lyme, hx of cuff tear, lyme diease  Patient provided verbal consent to treatment plan and recommended interventions.    Manuals 2.6 2.12        visit 1 2                                      Neuro Re-Ed                                                                                Ther Ex          JULIEN 2'         Prone lying 3'         Press up 2*5         Postural ed/roll FB                                                 Ther Activity          Pt edu FB                   Modalities

## 2024-02-16 ENCOUNTER — APPOINTMENT (OUTPATIENT)
Dept: PHYSICAL THERAPY | Facility: CLINIC | Age: 74
End: 2024-02-16
Payer: MEDICARE

## 2024-02-16 ENCOUNTER — TELEPHONE (OUTPATIENT)
Dept: PAIN MEDICINE | Facility: CLINIC | Age: 74
End: 2024-02-16

## 2024-02-19 DIAGNOSIS — M54.16 LUMBAR RADICULOPATHY: Primary | ICD-10-CM

## 2024-02-19 DIAGNOSIS — D18.09 HEMANGIOMA OF OTHER SITES: ICD-10-CM

## 2024-02-20 ENCOUNTER — TELEPHONE (OUTPATIENT)
Age: 74
End: 2024-02-20

## 2024-02-20 DIAGNOSIS — M54.16 LUMBAR RADICULOPATHY: Primary | ICD-10-CM

## 2024-02-20 NOTE — TELEPHONE ENCOUNTER
Caller: Patient    Doctor: Gilberto    Reason for call: Pt is supposed to have a CT w/ contrast, but states she has kidney disease. Pt is wondering if it can be done without contrast?   Please advise    Call back#: [phone]

## 2024-02-20 NOTE — TELEPHONE ENCOUNTER
S/W pt.  Advised pt of the same.  Advised [facility].  Pt verbalized understanding.    Faxed script via Epic.

## 2024-02-20 NOTE — TELEPHONE ENCOUNTER
Caller: [facility]    Doctor: MICHAEL    Reason for call: would like the script for the CT faxed over to them  FAX# [phone]  Att: access center    Call back#: [phone]

## 2024-02-21 NOTE — TELEPHONE ENCOUNTER
Caller: [facility] scheduling     Doctor: MICHAEL     Reason for call: would like the script for the CT faxed over to them it looks like the fax has been sent through [facility] and they haven't received it if possible please print and send via fax     FAX# [phone]  Att: [facility] Scheduling     Call back#: [phone]

## 2024-02-22 NOTE — TELEPHONE ENCOUNTER
Caller: Patient    Doctor: Gilberto    Reason for call: Pt calling back to give fax number for CT script to be sent to  Fax #: [phone]    Call back#: [phone]

## 2024-02-22 NOTE — TELEPHONE ENCOUNTER
Caller: Mirna      Doctor: Dr Macias      Reason for call: Patient would like the script for the CT faxed over to them  FAX# [phone] patient stating [facility] stating did not received it   Att: [facility] center     Call back#: [phone]

## 2024-02-28 ENCOUNTER — OFFICE VISIT (OUTPATIENT)
Age: 74
End: 2024-02-28
Payer: MEDICARE

## 2024-02-28 VITALS
TEMPERATURE: 97.2 F | SYSTOLIC BLOOD PRESSURE: 120 MMHG | OXYGEN SATURATION: 97 % | HEIGHT: 61 IN | HEART RATE: 71 BPM | DIASTOLIC BLOOD PRESSURE: 76 MMHG | WEIGHT: 159.6 LBS | BODY MASS INDEX: 30.13 KG/M2

## 2024-02-28 DIAGNOSIS — R22.31 NODULE OF FINGER OF RIGHT HAND: Primary | ICD-10-CM

## 2024-02-28 DIAGNOSIS — R93.89 THICKENED ENDOMETRIUM: ICD-10-CM

## 2024-02-28 DIAGNOSIS — M54.16 LUMBAR RADICULOPATHY: ICD-10-CM

## 2024-02-28 PROCEDURE — 99214 OFFICE O/P EST MOD 30 MIN: CPT | Performed by: NURSE PRACTITIONER

## 2024-02-28 NOTE — ASSESSMENT & PLAN NOTE
- MRI and CT imaging reviewed with patient  - encouraged to start gabapentin as prescribed by spine and pain  - follow up with spine [facility] pain on 3/7
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
## 2024-02-28 NOTE — ASSESSMENT & PLAN NOTE
- tender nodule of right index finger, PIP joint  - she has known arthritis of both hands, visible herberden nodules of DIP joints  - advised to use voltaren gel to affected area  - notify office if pain does not improve.

## 2024-02-28 NOTE — ASSESSMENT & PLAN NOTE
- initially seen incidentally on MRI lumbar spine. Pt followed up with [facility] in [place] and US showed 8mm. She is scheduled for biopsy with them

## 2024-03-11 ENCOUNTER — OFFICE VISIT (OUTPATIENT)
Dept: PAIN MEDICINE | Facility: CLINIC | Age: 74
End: 2024-03-11
Payer: MEDICARE

## 2024-03-11 VITALS
HEIGHT: 61 IN | SYSTOLIC BLOOD PRESSURE: 130 MMHG | DIASTOLIC BLOOD PRESSURE: 78 MMHG | HEART RATE: 71 BPM | WEIGHT: 160.4 LBS | BODY MASS INDEX: 30.29 KG/M2

## 2024-03-11 DIAGNOSIS — G89.4 CHRONIC PAIN SYNDROME: ICD-10-CM

## 2024-03-11 DIAGNOSIS — E04.1 THYROID NODULE: ICD-10-CM

## 2024-03-11 DIAGNOSIS — M54.16 LUMBAR RADICULOPATHY: Primary | ICD-10-CM

## 2024-03-11 PROCEDURE — 99213 OFFICE O/P EST LOW 20 MIN: CPT | Performed by: STUDENT IN AN ORGANIZED HEALTH CARE EDUCATION/TRAINING PROGRAM
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
## 2024-03-22 ENCOUNTER — HOSPITAL ENCOUNTER (EMERGENCY)
Facility: HOSPITAL | Age: 74
Discharge: HOME | End: 2024-03-22
Attending: EMERGENCY MEDICINE
Payer: MEDICARE

## 2024-03-22 VITALS
RESPIRATION RATE: 18 BRPM | DIASTOLIC BLOOD PRESSURE: 74 MMHG | OXYGEN SATURATION: 98 % | SYSTOLIC BLOOD PRESSURE: 163 MMHG | TEMPERATURE: 98.4 F | HEART RATE: 75 BPM

## 2024-03-22 DIAGNOSIS — S81.812A LACERATION OF LEFT LOWER EXTREMITY, INITIAL ENCOUNTER: Primary | ICD-10-CM

## 2024-03-22 PROCEDURE — 99282 EMERGENCY DEPT VISIT SF MDM: CPT

## 2024-03-22 PROCEDURE — 90715 TDAP VACCINE 7 YRS/> IM: CPT | Performed by: PHYSICIAN ASSISTANT

## 2024-03-22 PROCEDURE — 90471 IMMUNIZATION ADMIN: CPT

## 2024-03-22 PROCEDURE — 12001 RPR S/N/AX/GEN/TRNK 2.5CM/<: CPT | Performed by: PHYSICIAN ASSISTANT

## 2024-03-22 PROCEDURE — 99284 EMERGENCY DEPT VISIT MOD MDM: CPT | Performed by: PHYSICIAN ASSISTANT

## 2024-03-22 RX ORDER — CEPHALEXIN 250 MG/1
500 CAPSULE ORAL ONCE
Status: COMPLETED | OUTPATIENT
Start: 2024-03-22 | End: 2024-03-22

## 2024-03-22 RX ORDER — CEPHALEXIN 500 MG/1
500 CAPSULE ORAL 3 TIMES DAILY
Qty: 15 CAPSULE | Refills: 0 | Status: SHIPPED | OUTPATIENT
Start: 2024-03-22 | End: 2024-03-27

## 2024-03-22 RX ADMIN — CEPHALEXIN 500 MG: 250 CAPSULE ORAL at 20:07

## 2024-03-22 RX ADMIN — TETANUS TOXOID, REDUCED DIPHTHERIA TOXOID AND ACELLULAR PERTUSSIS VACCINE, ADSORBED 0.5 ML: 5; 2.5; 8; 8; 2.5 SUSPENSION INTRAMUSCULAR at 20:21
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
## 2024-03-23 NOTE — DISCHARGE INSTRUCTIONS
Use Tylenol 650 mg every 4 hours or Motrin 600 mg every 6 hours; you can alternate the 2 medications taking something every 3 hours for pain as needed.  Suture removal in 7-10 days.  Remove dressing in 24 hours, then wash gently with soap and water pat drying keep covered with antibiotic ointment and dressing.

## 2024-04-04 ENCOUNTER — APPOINTMENT (OUTPATIENT)
Dept: RADIOLOGY | Facility: CLINIC | Age: 74
End: 2024-04-04
Payer: MEDICARE

## 2024-04-04 ENCOUNTER — OFFICE VISIT (OUTPATIENT)
Dept: OBGYN CLINIC | Facility: CLINIC | Age: 74
End: 2024-04-04
Payer: MEDICARE

## 2024-04-04 VITALS
HEART RATE: 72 BPM | SYSTOLIC BLOOD PRESSURE: 120 MMHG | DIASTOLIC BLOOD PRESSURE: 64 MMHG | HEIGHT: 61 IN | WEIGHT: 164 LBS | BODY MASS INDEX: 30.96 KG/M2

## 2024-04-04 DIAGNOSIS — M17.12 PRIMARY OSTEOARTHRITIS OF LEFT KNEE: ICD-10-CM

## 2024-04-04 DIAGNOSIS — M25.562 ACUTE PAIN OF LEFT KNEE: Primary | ICD-10-CM

## 2024-04-04 DIAGNOSIS — M25.562 ACUTE PAIN OF LEFT KNEE: ICD-10-CM

## 2024-04-04 PROCEDURE — 73562 X-RAY EXAM OF KNEE 3: CPT

## 2024-04-04 PROCEDURE — 73564 X-RAY EXAM KNEE 4 OR MORE: CPT

## 2024-04-04 PROCEDURE — 99213 OFFICE O/P EST LOW 20 MIN: CPT | Performed by: ORTHOPAEDIC SURGERY
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
## 2024-04-12 ENCOUNTER — PROCEDURE VISIT (OUTPATIENT)
Dept: OBGYN CLINIC | Facility: CLINIC | Age: 74
End: 2024-04-12
Payer: MEDICARE

## 2024-04-12 ENCOUNTER — TELEPHONE (OUTPATIENT)
Dept: OBGYN CLINIC | Facility: CLINIC | Age: 74
End: 2024-04-12

## 2024-04-12 VITALS
DIASTOLIC BLOOD PRESSURE: 72 MMHG | BODY MASS INDEX: 30.96 KG/M2 | HEIGHT: 61 IN | WEIGHT: 164 LBS | SYSTOLIC BLOOD PRESSURE: 138 MMHG | HEART RATE: 70 BPM

## 2024-04-12 DIAGNOSIS — M79.641 RIGHT HAND PAIN: ICD-10-CM

## 2024-04-12 DIAGNOSIS — M17.12 PRIMARY OSTEOARTHRITIS OF LEFT KNEE: Primary | ICD-10-CM

## 2024-04-12 PROCEDURE — 20610 DRAIN/INJ JOINT/BURSA W/O US: CPT

## 2024-04-12 RX ADMIN — Medication 20 MG: at 13:45

## 2024-04-12 NOTE — PROGRESS NOTES
Large joint arthrocentesis: L knee  Universal Protocol:  Consent: Verbal consent obtained.  Risks and benefits: risks, benefits and alternatives were discussed  Consent given by: patient  Timeout called at: 4/12/2024 1:54 PM.  Patient understanding: patient states understanding of the procedure being performed  Patient consent: the patient's understanding of the procedure matches consent given  Site marked: the operative site was marked  Radiology Images displayed and confirmed. If images not available, report reviewed: imaging studies available  Patient identity confirmed: verbally with patient  Supporting Documentation  Indications: pain   Procedure Details  Location: knee - L knee  Needle size: 22 G  Ultrasound guidance: no  Approach: lateral  Medications administered: 20 mg Sodium Hyaluronate (Viscosup) 20 MG/2ML    Patient tolerance: patient tolerated the procedure well with no immediate complications  Dressing:  Sterile dressing applied           Mirna presents for her first of 3 Euflexxa injections to treat left primary knee osteoarthritis. Today, the patient reports 10/10 left knee pain. She notes the knee is swollen a standing for many hours at work today. The patient tolerated the procedure well without any difficulties or complications. Post-injections were reviewed. We will plan to follow up with Mirna in 1 week for the next injection of the series.

## 2024-04-12 NOTE — TELEPHONE ENCOUNTER
Lvm for patient regarding her injections. She had her first injection on 4/12/24 for Euflexxa and needs to be scheduled with the next 2 injections. I will contact the patient again on Monday if I do not receive a call or see her on the schedule by the end of this week.

## 2024-04-16 RX ORDER — HYALURONATE SODIUM 10 MG/ML
20 SYRINGE (ML) INTRAARTICULAR
Status: COMPLETED | OUTPATIENT
Start: 2024-04-12 | End: 2024-04-12

## 2024-04-25 ENCOUNTER — PROCEDURE VISIT (OUTPATIENT)
Dept: OBGYN CLINIC | Facility: CLINIC | Age: 74
End: 2024-04-25
Payer: MEDICARE

## 2024-04-25 VITALS
SYSTOLIC BLOOD PRESSURE: 114 MMHG | HEART RATE: 68 BPM | BODY MASS INDEX: 30.96 KG/M2 | HEIGHT: 61 IN | WEIGHT: 164 LBS | DIASTOLIC BLOOD PRESSURE: 64 MMHG

## 2024-04-25 DIAGNOSIS — M25.562 CHRONIC PAIN OF LEFT KNEE: ICD-10-CM

## 2024-04-25 DIAGNOSIS — G89.29 CHRONIC PAIN OF LEFT KNEE: ICD-10-CM

## 2024-04-25 DIAGNOSIS — M17.12 PRIMARY OSTEOARTHRITIS OF LEFT KNEE: Primary | ICD-10-CM

## 2024-04-25 PROCEDURE — 20610 DRAIN/INJ JOINT/BURSA W/O US: CPT | Performed by: ORTHOPAEDIC SURGERY

## 2024-04-25 RX ORDER — HYALURONATE SODIUM 10 MG/ML
20 SYRINGE (ML) INTRAARTICULAR
Status: COMPLETED | OUTPATIENT
Start: 2024-04-25 | End: 2024-04-25

## 2024-04-25 RX ADMIN — Medication 20 MG: at 09:00

## 2024-04-25 NOTE — PROGRESS NOTES
"Large joint arthrocentesis: L knee  Universal Protocol:  Consent: Verbal consent obtained.  Risks and benefits: risks, benefits and alternatives were discussed  Consent given by: patient  Time out: Immediately prior to procedure a \"time out\" was called to verify the correct patient, procedure, equipment, support staff and site/side marked as required.  Patient understanding: patient states understanding of the procedure being performed  Site marked: the operative site was marked  Patient identity confirmed: verbally with patient  Supporting Documentation  Indications: pain   Procedure Details  Location: knee - L knee  Preparation: Patient was prepped and draped in the usual sterile fashion  Needle size: 22 G  Approach: anterolateral  Medications administered: 20 mg Sodium Hyaluronate (Viscosup) 20 MG/2ML    Patient tolerance: patient tolerated the procedure well with no immediate complications  Dressing:  Sterile dressing applied           Mirna presents for her second of 3 Euflexxa injections to treat left primary knee osteoarthritis. Today, the patient reports 7/10 left knee pain. The patient tolerated the procedure well without any difficulties or complications. Post-injection instructions were reviewed. We will plan to follow up with Mirna in 1 week for the final injection of the series.     Scribe Attestation    I,:  Alfreda Tian am acting as a scribe while in the presence of the attending physician.:       I,:  Momo Macias MD personally performed the services described in this documentation    as scribed in my presence.:           "

## 2024-04-26 ENCOUNTER — TELEPHONE (OUTPATIENT)
Dept: GASTROENTEROLOGY | Facility: CLINIC | Age: 74
End: 2024-04-26

## 2024-04-29 ENCOUNTER — OFFICE VISIT (OUTPATIENT)
Dept: OBGYN CLINIC | Facility: CLINIC | Age: 74
End: 2024-04-29
Payer: MEDICARE

## 2024-04-29 ENCOUNTER — APPOINTMENT (OUTPATIENT)
Dept: RADIOLOGY | Facility: CLINIC | Age: 74
End: 2024-04-29
Payer: MEDICARE

## 2024-04-29 VITALS
SYSTOLIC BLOOD PRESSURE: 127 MMHG | HEART RATE: 74 BPM | DIASTOLIC BLOOD PRESSURE: 73 MMHG | HEIGHT: 61 IN | BODY MASS INDEX: 30.96 KG/M2 | WEIGHT: 164 LBS

## 2024-04-29 DIAGNOSIS — M79.641 RIGHT HAND PAIN: ICD-10-CM

## 2024-04-29 DIAGNOSIS — M18.0 ARTHRITIS OF CARPOMETACARPAL (CMC) JOINT OF BOTH THUMBS: Primary | ICD-10-CM

## 2024-04-29 DIAGNOSIS — M79.642 LEFT HAND PAIN: ICD-10-CM

## 2024-04-29 PROCEDURE — 73130 X-RAY EXAM OF HAND: CPT

## 2024-04-29 PROCEDURE — 99213 OFFICE O/P EST LOW 20 MIN: CPT | Performed by: STUDENT IN AN ORGANIZED HEALTH CARE EDUCATION/TRAINING PROGRAM
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
## 2024-04-30 ENCOUNTER — RA CDI HCC (OUTPATIENT)
Dept: OTHER | Facility: HOSPITAL | Age: 74
End: 2024-04-30

## 2024-05-06 ENCOUNTER — PROCEDURE VISIT (OUTPATIENT)
Dept: OBGYN CLINIC | Facility: CLINIC | Age: 74
End: 2024-05-06
Payer: MEDICARE

## 2024-05-06 DIAGNOSIS — M17.12 PRIMARY OSTEOARTHRITIS OF LEFT KNEE: Primary | ICD-10-CM

## 2024-05-06 PROCEDURE — 20610 DRAIN/INJ JOINT/BURSA W/O US: CPT

## 2024-05-06 RX ORDER — HYALURONATE SODIUM 10 MG/ML
20 SYRINGE (ML) INTRAARTICULAR
Status: COMPLETED | OUTPATIENT
Start: 2024-05-06 | End: 2024-05-06

## 2024-05-06 RX ADMIN — Medication 20 MG: at 14:15

## 2024-05-06 NOTE — PROGRESS NOTES
Large joint arthrocentesis: L knee  Universal Protocol:  Consent: Verbal consent obtained.  Risks and benefits: risks, benefits and alternatives were discussed  Consent given by: patient  Timeout called at: 5/6/2024 2:25 PM.  Patient understanding: patient states understanding of the procedure being performed  Patient consent: the patient's understanding of the procedure matches consent given  Site marked: the operative site was marked  Radiology Images displayed and confirmed. If images not available, report reviewed: imaging studies available  Patient identity confirmed: verbally with patient  Supporting Documentation  Indications: pain   Procedure Details  Location: knee - L knee  Needle size: 22 G  Ultrasound guidance: no  Approach: anterolateral  Medications administered: 20 mg Sodium Hyaluronate (Viscosup) 20 MG/2ML    Patient tolerance: patient tolerated the procedure well with no immediate complications  Dressing:  Sterile dressing applied           Mirna presents for her third of 3 Euflexxa injections to treat left primary knee osteoarthritis. Today, the patient reports 6-7/10 left knee pain. Depending on her symptomatic relief following these injections, she may be interested in Visco injections for her right knee arthritis. She does report the knees feel stiff with flexion. The patient does have a Baker's cyst in the right knee that fluctuates in size and pain level. She denies calf pain but notes her Cardiologist has evaluated this posterior knee pain with Ultrasound, which was negative for DVT. Mirna also has a history of low back pain with sciatica, which does flare from occasionally. On exam, the patient does not have a palpable Baker's cyst or significant joint effusion. The patient tolerated the procedure well without any difficulties or complications. Post-injection instructions were reviewed. We will plan to follow up with Mirna as needed following this last injection of the series. We reviewed  that Visco injections can occur every 6 months at a minimum. If she is interested in Visco injections for her right knee, she can call the office. We also discussed a referral to PT for her lumbar radiculopathy and bilateral knee arthritis, but the patient is not interested in attending PT at this time.     Scribe Attestation      I,:  Georgiana Peacock PA-C am acting as a scribe while in the presence of the attending physician.:       I,:  Momo Macias MD personally performed the services described in this documentation    as scribed in my presence.:

## 2024-05-07 ENCOUNTER — OFFICE VISIT (OUTPATIENT)
Age: 74
End: 2024-05-07
Payer: MEDICARE

## 2024-05-07 ENCOUNTER — TELEPHONE (OUTPATIENT)
Age: 74
End: 2024-05-07

## 2024-05-07 VITALS
DIASTOLIC BLOOD PRESSURE: 68 MMHG | BODY MASS INDEX: 30.58 KG/M2 | HEART RATE: 71 BPM | WEIGHT: 162 LBS | TEMPERATURE: 98.4 F | SYSTOLIC BLOOD PRESSURE: 132 MMHG | HEIGHT: 61 IN | OXYGEN SATURATION: 98 %

## 2024-05-07 DIAGNOSIS — E53.8 B12 DEFICIENCY: Primary | ICD-10-CM

## 2024-05-07 DIAGNOSIS — Z00.00 MEDICARE ANNUAL WELLNESS VISIT, SUBSEQUENT: Primary | ICD-10-CM

## 2024-05-07 DIAGNOSIS — I10 ESSENTIAL HYPERTENSION: ICD-10-CM

## 2024-05-07 DIAGNOSIS — M15.9 PRIMARY OSTEOARTHRITIS INVOLVING MULTIPLE JOINTS: ICD-10-CM

## 2024-05-07 DIAGNOSIS — I25.10 CORONARY ARTERY DISEASE INVOLVING NATIVE CORONARY ARTERY OF NATIVE HEART WITHOUT ANGINA PECTORIS: ICD-10-CM

## 2024-05-07 DIAGNOSIS — E53.8 B12 DEFICIENCY: ICD-10-CM

## 2024-05-07 DIAGNOSIS — N18.2 CKD (CHRONIC KIDNEY DISEASE) STAGE 2, GFR 60-89 ML/MIN: ICD-10-CM

## 2024-05-07 PROBLEM — I47.10 SVT (SUPRAVENTRICULAR TACHYCARDIA): Status: RESOLVED | Noted: 2019-02-07 | Resolved: 2024-05-07

## 2024-05-07 PROBLEM — Z86.19 HISTORY OF HPV INFECTION: Status: ACTIVE | Noted: 2024-02-12

## 2024-05-07 PROBLEM — R10.30 LOWER ABDOMINAL PAIN: Status: RESOLVED | Noted: 2019-01-03 | Resolved: 2024-05-07

## 2024-05-07 PROCEDURE — 99214 OFFICE O/P EST MOD 30 MIN: CPT | Performed by: INTERNAL MEDICINE

## 2024-05-07 PROCEDURE — G0439 PPPS, SUBSEQ VISIT: HCPCS | Performed by: INTERNAL MEDICINE

## 2024-05-07 RX ORDER — PYRIDOXINE HCL (VITAMIN B6) 50 MG
50 TABLET ORAL DAILY
Start: 2024-05-07

## 2024-05-07 NOTE — TELEPHONE ENCOUNTER
Patient called stating she just missed a call from her PCP, I looked over encounters but did not see any notes regarding a phone call, warm transfer to Clinical staff for further assistance

## 2024-05-07 NOTE — ASSESSMENT & PLAN NOTE
Blood pressure stable and well-controlled on Coreg 12.5 mg twice daily.  Edema from amlodipine has resolved with discontinuation of medication
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
## 2024-05-07 NOTE — ASSESSMENT & PLAN NOTE
Lab Results   Component Value Date    EGFR 50 07/11/2021    EGFR 45 04/01/2021    EGFR 43 03/25/2021    CREATININE 1.10 07/11/2021    CREATININE 1.21 (H) 04/01/2021    CREATININE 1.24 03/25/2021   Most recent labs disclosed EGFR of 64 with a BUN of 25 and creatinine of 0.93

## 2024-05-22 ENCOUNTER — TELEPHONE (OUTPATIENT)
Age: 74
End: 2024-05-22

## 2024-05-22 NOTE — TELEPHONE ENCOUNTER
Pt called wanting to see if anything could be called for her scratchy sore throat and coughing.     Pharm: [facility] - [address] [phone]     Thank you

## 2024-05-23 ENCOUNTER — OFFICE VISIT (OUTPATIENT)
Age: 74
End: 2024-05-23
Payer: MEDICARE

## 2024-05-23 VITALS
TEMPERATURE: 97.7 F | SYSTOLIC BLOOD PRESSURE: 120 MMHG | HEART RATE: 65 BPM | OXYGEN SATURATION: 98 % | WEIGHT: 162.2 LBS | BODY MASS INDEX: 30.62 KG/M2 | HEIGHT: 61 IN | DIASTOLIC BLOOD PRESSURE: 78 MMHG

## 2024-05-23 DIAGNOSIS — I25.10 CORONARY ARTERY DISEASE INVOLVING NATIVE CORONARY ARTERY OF NATIVE HEART WITHOUT ANGINA PECTORIS: ICD-10-CM

## 2024-05-23 DIAGNOSIS — N18.2 CKD (CHRONIC KIDNEY DISEASE) STAGE 2, GFR 60-89 ML/MIN: ICD-10-CM

## 2024-05-23 DIAGNOSIS — J06.9 VIRAL URI WITH COUGH: ICD-10-CM

## 2024-05-23 DIAGNOSIS — J30.2 SEASONAL ALLERGIES: Primary | ICD-10-CM

## 2024-05-23 DIAGNOSIS — E78.00 PURE HYPERCHOLESTEROLEMIA: ICD-10-CM

## 2024-05-23 PROCEDURE — 99213 OFFICE O/P EST LOW 20 MIN: CPT | Performed by: INTERNAL MEDICINE

## 2024-05-23 PROCEDURE — G2211 COMPLEX E/M VISIT ADD ON: HCPCS | Performed by: INTERNAL MEDICINE
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
## 2024-06-06 PROBLEM — Z00.00 MEDICARE ANNUAL WELLNESS VISIT, SUBSEQUENT: Status: RESOLVED | Noted: 2020-08-14 | Resolved: 2024-06-06

## 2024-07-18 ENCOUNTER — OFFICE VISIT (OUTPATIENT)
Dept: GASTROENTEROLOGY | Facility: CLINIC | Age: 74
End: 2024-07-18
Payer: MEDICARE

## 2024-07-18 VITALS
BODY MASS INDEX: 28.93 KG/M2 | WEIGHT: 157.2 LBS | DIASTOLIC BLOOD PRESSURE: 71 MMHG | SYSTOLIC BLOOD PRESSURE: 136 MMHG | HEIGHT: 62 IN | HEART RATE: 68 BPM

## 2024-07-18 DIAGNOSIS — R13.10 DYSPHAGIA, UNSPECIFIED TYPE: ICD-10-CM

## 2024-07-18 DIAGNOSIS — K21.9 GASTROESOPHAGEAL REFLUX DISEASE WITHOUT ESOPHAGITIS: Primary | ICD-10-CM

## 2024-07-18 PROCEDURE — 99214 OFFICE O/P EST MOD 30 MIN: CPT | Performed by: NURSE PRACTITIONER

## 2024-07-18 PROCEDURE — G2211 COMPLEX E/M VISIT ADD ON: HCPCS | Performed by: NURSE PRACTITIONER

## 2024-07-18 RX ORDER — OMEPRAZOLE 40 MG/1
40 CAPSULE, DELAYED RELEASE ORAL DAILY
Qty: 30 CAPSULE | Refills: 5 | Status: SHIPPED | OUTPATIENT
Start: 2024-07-18 | End: 2024-08-17

## 2024-07-18 NOTE — PATIENT INSTRUCTIONS
Follow antireflux diet and measures-may have 1-2 8 ounce cups of caffeine daily avoid any additional caffeine, chocolate, carbonated beverages, spicy fatty foods, and tomato based products.  You eat stay sitting upright for 1 hour after eating.  Do not eat 2 to 3 hours before you go to bed and when asleep at night sleep with your head of bed elevated on multiple pillows.  Eat slow, chew well, alternate solids with liquids  Avoid dry food
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
## 2024-08-05 ENCOUNTER — HOSPITAL ENCOUNTER (EMERGENCY)
Facility: HOSPITAL | Age: 74
Discharge: HOME | End: 2024-08-05
Attending: EMERGENCY MEDICINE
Payer: OTHER MISCELLANEOUS

## 2024-08-05 VITALS
RESPIRATION RATE: 18 BRPM | SYSTOLIC BLOOD PRESSURE: 161 MMHG | DIASTOLIC BLOOD PRESSURE: 75 MMHG | HEART RATE: 69 BPM | TEMPERATURE: 97.7 F | OXYGEN SATURATION: 98 %

## 2024-08-05 DIAGNOSIS — S81.812A LACERATION OF LEFT LOWER LEG, INITIAL ENCOUNTER: Primary | ICD-10-CM

## 2024-08-05 PROCEDURE — 99283 EMERGENCY DEPT VISIT LOW MDM: CPT

## 2024-08-05 PROCEDURE — 99284 EMERGENCY DEPT VISIT MOD MDM: CPT | Performed by: EMERGENCY MEDICINE

## 2024-08-05 RX ORDER — GINSENG 100 MG
1 CAPSULE ORAL ONCE
Status: COMPLETED | OUTPATIENT
Start: 2024-08-05 | End: 2024-08-05

## 2024-08-05 RX ORDER — ACETAMINOPHEN 325 MG/1
975 TABLET ORAL ONCE
Status: COMPLETED | OUTPATIENT
Start: 2024-08-05 | End: 2024-08-05

## 2024-08-05 RX ADMIN — ACETAMINOPHEN 975 MG: 325 TABLET, FILM COATED ORAL at 14:06

## 2024-08-05 RX ADMIN — BACITRACIN 1 LARGE APPLICATION: 500 OINTMENT TOPICAL at 15:04

## 2024-08-05 NOTE — ED ATTENDING ATTESTATION
8/5/2024  I, Prabhjot Cisse MD, saw and evaluated the patient. I have discussed the patient with the resident/non-physician practitioner and agree with the resident's/non-physician practitioner's findings, Plan of Care, and MDM as documented in the resident's/non-physician practitioner's note, except where noted. All available labs and Radiology studies were reviewed.  I was present for key portions of any procedure(s) performed by the resident/non-physician practitioner and I was immediately available to provide assistance.       At this point I agree with the current assessment done in the Emergency Department.  I have conducted an independent evaluation of this patient a history and physical is as follows:    73-year-old female, presents with injury to left lower leg.  Was accidentally struck by a cart at work prior to arrival.  On exam, patient has skin tear to left shin with mild active bleeding.  Able to ambulate without difficulty.    ED Course     Wound thoroughly irrigated in the ED, bleeding controlled with pressure dressing using Surgicel.  Patient instructed to clean daily and watch for signs of infection, return precautions given.    Critical Care Time  Procedures

## 2024-08-05 NOTE — Clinical Note
Mirna Boswell was seen and treated in our emergency department on 8/5/2024.    No restrictions            Diagnosis:     Mirna  may return to work on return date.    She may return on this date: 08/07/2024    May return to work with no restrictions. Weight bearing as tolerated.      If you have any questions or concerns, please don't hesitate to call.      Michel Singer MD    ______________________________           _______________          _______________  Hospital Representative                              Date                                Time

## 2024-08-06 ENCOUNTER — HOSPITAL ENCOUNTER (OUTPATIENT)
Dept: RADIOLOGY | Facility: HOSPITAL | Age: 74
Discharge: HOME | End: 2024-08-06
Payer: MEDICARE

## 2024-08-06 DIAGNOSIS — R13.10 DYSPHAGIA, UNSPECIFIED TYPE: ICD-10-CM

## 2024-08-06 PROCEDURE — 74220 X-RAY XM ESOPHAGUS 1CNTRST: CPT

## 2024-08-21 ENCOUNTER — OFFICE VISIT (OUTPATIENT)
Dept: WOUND CARE | Facility: HOSPITAL | Age: 74
End: 2024-08-21
Payer: OTHER MISCELLANEOUS

## 2024-08-21 VITALS
RESPIRATION RATE: 18 BRPM | TEMPERATURE: 97.8 F | HEART RATE: 56 BPM | DIASTOLIC BLOOD PRESSURE: 80 MMHG | HEIGHT: 62 IN | BODY MASS INDEX: 28.71 KG/M2 | WEIGHT: 156 LBS | SYSTOLIC BLOOD PRESSURE: 133 MMHG

## 2024-08-21 DIAGNOSIS — S81.802A TRAUMATIC OPEN WOUND OF LEFT LOWER LEG, INITIAL ENCOUNTER: Primary | ICD-10-CM

## 2024-08-21 PROCEDURE — 99204 OFFICE O/P NEW MOD 45 MIN: CPT | Performed by: STUDENT IN AN ORGANIZED HEALTH CARE EDUCATION/TRAINING PROGRAM

## 2024-08-21 PROCEDURE — 11045 DBRDMT SUBQ TISS EACH ADDL: CPT | Performed by: STUDENT IN AN ORGANIZED HEALTH CARE EDUCATION/TRAINING PROGRAM

## 2024-08-21 PROCEDURE — 97597 DBRDMT OPN WND 1ST 20 CM/<: CPT | Performed by: STUDENT IN AN ORGANIZED HEALTH CARE EDUCATION/TRAINING PROGRAM

## 2024-08-21 PROCEDURE — 11042 DBRDMT SUBQ TIS 1ST 20SQCM/<: CPT | Performed by: STUDENT IN AN ORGANIZED HEALTH CARE EDUCATION/TRAINING PROGRAM

## 2024-08-21 PROCEDURE — 97598 DBRDMT OPN WND ADDL 20CM/<: CPT | Performed by: STUDENT IN AN ORGANIZED HEALTH CARE EDUCATION/TRAINING PROGRAM

## 2024-08-21 PROCEDURE — NC001 PR NO CHARGE: Performed by: STUDENT IN AN ORGANIZED HEALTH CARE EDUCATION/TRAINING PROGRAM

## 2024-08-21 PROCEDURE — 99213 OFFICE O/P EST LOW 20 MIN: CPT | Performed by: STUDENT IN AN ORGANIZED HEALTH CARE EDUCATION/TRAINING PROGRAM

## 2024-08-21 RX ORDER — DOXYCYCLINE 100 MG/1
100 CAPSULE ORAL EVERY 12 HOURS
COMMUNITY
Start: 2024-08-19

## 2024-08-21 RX ORDER — CEPHALEXIN 500 MG/1
CAPSULE ORAL
COMMUNITY
Start: 2024-08-19

## 2024-08-21 RX ORDER — LIDOCAINE 40 MG/G
CREAM TOPICAL ONCE
Status: COMPLETED | OUTPATIENT
Start: 2024-08-21 | End: 2024-08-21

## 2024-08-21 RX ADMIN — LIDOCAINE 1 APPLICATION: 40 CREAM TOPICAL at 09:31

## 2024-08-21 NOTE — PROGRESS NOTES
"Patient ID: Mirna Boswell is a 73 y.o. female Date of Birth 1950     Chief Complaint  Chief Complaint   Patient presents with    New Patient Visit     Left leg wound       Allergies  Penicillins, Sulfa antibiotics, and Sulfanilamide    Assessment:     Diagnoses and all orders for this visit:    Traumatic open wound of left lower leg, initial encounter  -     lidocaine (LMX) 4 % cream  -     Wound cleansing and dressings Traumatic Left Pretibial; Future  -     Wound Procedure Treatment Traumatic Left Pretibial  -     Debridement    Other orders  -     cephalexin (KEFLEX) 250 mg capsule; take one capsule by mouth four times a day  -     cephalexin (KEFLEX) 500 mg capsule; take one capsule by mouth four times a day  -     doxycycline hyclate (VIBRAMYCIN) 100 mg capsule; Take 100 mg by mouth every 12 (twelve) hours              Debridement   Wound 08/21/24 Traumatic Pretibial Left    Universal Protocol:  procedure performed by consultantConsent: Verbal consent obtained.  Risks and benefits: risks, benefits and alternatives were discussed  Consent given by: patient  Time out: Immediately prior to procedure a \"time out\" was called to verify the correct patient, procedure, equipment, support staff and site/side marked as required.  Patient identity confirmed: verbally with patient    Debridement Details  Performed by: physician  Debridement type: selective  Pain control: lidocaine 4%      Post-debridement measurements  Length (cm): 3.8  Width (cm): 5.6  Depth (cm): 0.2  Percent debrided: 100%  Surface Area (cm^2): 21.28  Area Debrided (cm^2): 21.28  Volume (cm^3): 4.26    Devitalized tissue debrided: biofilm, exudate, fibrin and slough  Instrument(s) utilized: curette  Bleeding: small  Hemostasis obtained with: pressure  Procedural pain (0-10): 4  Post-procedural pain: 2   Response to treatment: procedure was tolerated well        Plan:   It was a pleasure to see Mirna Boswell for wound care consult today  Subcu " debridement performed today as above  Start plan of care as noted below with polymem max ag, spandigrip  No signs or symptoms of infection today. Patient understands that if any signs of infection start (such as increased redness, drainage, pain, fever, chills, diaphoresis), they should call our office or proceed to the ER or Urgent Care.  Patient should continue a high protein diet to facilitate wound healing  Patient is advised to not submerge wound or leave wound open to air.  Follow up in 1 weeks  Given the multi-factorial nature of wound care, additional time was taken to review patient's treatment plan with other specialties and most recent pertinent lab work and imaging.   All plans of care discussed with patient at bedside who verbalized understanding with treatment plan.      Wound 08/21/24 Traumatic Pretibial Left (Active)   Wound Image Images linked 08/21/24 0950   Wound Description Yellow;White;Slough;Pink;Black 08/21/24 0928   Corrie-wound Assessment Edema;Fragile 08/21/24 0928   Wound Length (cm) 3.8 cm 08/21/24 0928   Wound Width (cm) 5.6 cm 08/21/24 0928   Wound Depth (cm) 0.1 cm 08/21/24 0928   Wound Surface Area (cm^2) 21.28 cm^2 08/21/24 0928   Wound Volume (cm^3) 2.128 cm^3 08/21/24 0928   Calculated Wound Volume (cm^3) 2.13 cm^3 08/21/24 0928   Drainage Amount Moderate 08/21/24 0928   Drainage Description Serosanguineous 08/21/24 0928   Non-staged Wound Description Full thickness 08/21/24 0928   Dressing Status Intact 08/21/24 0928       Wound 08/21/24 Traumatic Pretibial Left (Active)   Date First Assessed/Time First Assessed: 08/21/24 0920   Primary Wound Type: Traumatic  Location: Pretibial  Wound Location Orientation: Left       [REMOVED] Wound 02/09/24 Back N/A (Removed)   Resolved Date: 08/21/24  Date First Assessed/Time First Assessed: 02/09/24 1424   Location: Back  Wound Location Orientation: N/A  Incision's 1st Dressing: BANDAID 1 X 3 IN LF  Wound Outcome: Unknown (No longer present)        Subjective:      .    8/21/24: Drea - Mirna is a pleasant 73-year-old female with a past medical history of hypertension, CAD, GERD, dysphagia, CKD stage II, dyslipidemia here today for wound care consult.  Patient was referred by [facility].  Was initially seen at [facility] ER on August 5 for left lower extremity laceration that occurred at work.  At that visit it was noted that she had significant edema and no attempt to reapproximate wound with sutures was made.  Instead pressure dressing was placed and patient advised to follow-up with PCP.  Since then she has gone to urgent care [facility].  Unfortunately I am unable to view records from this visit.  States that she is now on both Keflex and doxycycline following the results of wound culture.  Is not sure regarding the length of therapy for this antibiotic regimen.  Denies any symptoms of infection including fever chills diaphoresis.  Presents with an adherent dressing on the wound.  Is not wearing any compression.        The following portions of the patient's history were reviewed and updated as appropriate: allergies, current medications, past family history, past medical history, past social history, past surgical history, and problem list.    Review of Systems   Constitutional:  Negative for chills, diaphoresis and fever.   Skin:  Positive for wound.   All other systems reviewed and are negative.        Objective:       Wound 08/21/24 Traumatic Pretibial Left (Active)   Wound Image Images linked 08/21/24 0950   Wound Description Yellow;White;Slough;Pink;Black 08/21/24 0928   Corrie-wound Assessment Edema;Fragile 08/21/24 0928   Wound Length (cm) 3.8 cm 08/21/24 0928   Wound Width (cm) 5.6 cm 08/21/24 0928   Wound Depth (cm) 0.1 cm 08/21/24 0928   Wound Surface Area (cm^2) 21.28 cm^2 08/21/24 0928   Wound Volume (cm^3) 2.128 cm^3 08/21/24 0928   Calculated Wound Volume (cm^3) 2.13 cm^3 08/21/24 0928   Drainage Amount Moderate  "08/21/24 0928   Drainage Description Serosanguineous 08/21/24 0928   Non-staged Wound Description Full thickness 08/21/24 0928   Dressing Status Intact 08/21/24 0928       /80   Pulse 56   Temp 97.8 °F (36.6 °C)   Resp 18   Ht 5' 2\" (1.575 m)   Wt 70.8 kg (156 lb)   BMI 28.53 kg/m²     Physical Exam  Vitals reviewed.   Constitutional:       Appearance: Normal appearance.   HENT:      Head: Normocephalic and atraumatic.      Mouth/Throat:      Mouth: Mucous membranes are moist.   Eyes:      Extraocular Movements: Extraocular movements intact.   Pulmonary:      Effort: Pulmonary effort is normal.   Musculoskeletal:      Left lower leg: Edema (+2) present.   Skin:     Comments: Distal anterior LLE wound with slough and fibrin deposition. Full thickness. No signs of infection.   Neurological:      Mental Status: She is alert.   Psychiatric:         Mood and Affect: Mood normal.         Behavior: Behavior normal.                 Wound Instructions:  Orders Placed This Encounter   Procedures    Wound cleansing and dressings Traumatic Left Pretibial     Left leg wound:     Wash your hands with soap and water.  Remove old dressing, discard into plastic bag and place in trash.  Cleanse the wound with mild soap and water prior to applying a clean dressing. Do not use tissue or cotton balls. Do not scrub the wound. Pat dry using gauze.  Shower yes on dressing change days, remove dressing to shower. Cleanse last and rinse well. Do not leave open to air. Do not soak wound (No swimming, hot tubs, no soaks)  Apply moisturizer to skin surrounding wound  Apply polymem max ag (writing face up) to the open wound.  Cover with abd  Secure with rolled gauze and tape  Change dressing every other day.     Elastic Tubular Stocking    Tubular elastic bandage: Apply from base of toes to behind the knee. Apply in AM, may remove for sleep.    Avoid prolonged standing in one place.    Elevate leg(s) above the level of the heart when " "sitting or as much as possible.     Wound infection:  If you have signs of infection please call the wound center.  If the wound center is closed- please go to the Emergency department.  Some signs of infection:  fever, chills, increased redness, red streaks, increase in pain, increased drainage.  Drainage with an odor, Change in drainage color: white/milky/green/tan/yellow,  an increase in swelling, chest pain and/or shortness of breath.     Protein: Eat protein with each meal to promote healing.  Examples of protein are fish, meat, chicken, nuts, peanut butter, eggs, lentils, edamame or a protein shake.     Standing Status:   Future     Standing Expiration Date:   8/28/2024    Wound Procedure Treatment Traumatic Left Pretibial     This order was created via procedure documentation    Debridement     This order was created via procedure documentation        Diagnosis ICD-10-CM Associated Orders   1. Traumatic open wound of left lower leg, initial encounter  S81.802A lidocaine (LMX) 4 % cream     Wound cleansing and dressings Traumatic Left Pretibial     Wound Procedure Treatment Traumatic Left Pretibial     Debridement          --  Sadie Farley MD    \"This note has been constructed using a voice recognition system. Therefore there may be syntax, spelling, and/or grammatical errors. Occasional wrong word or \"sound alike\" substitutions may have occurred due to the inherent limitations of voice recognition software. Read the chart carefully and recognize, using context, where substitutions have occurred. Please call if you have any questions.\"     "

## 2024-08-21 NOTE — PATIENT INSTRUCTIONS
Orders Placed This Encounter   Procedures    Wound cleansing and dressings Traumatic Left Pretibial     Left leg wound:     Wash your hands with soap and water.  Remove old dressing, discard into plastic bag and place in trash.  Cleanse the wound with mild soap and water prior to applying a clean dressing. Do not use tissue or cotton balls. Do not scrub the wound. Pat dry using gauze.  Shower yes on dressing change days, remove dressing to shower. Cleanse last and rinse well. Do not leave open to air. Do not soak wound (No swimming, hot tubs, no soaks)  Apply moisturizer to skin surrounding wound  Apply polymem max ag (writing face up) to the open wound.  Cover with abd  Secure with rolled gauze and tape  Change dressing every other day.     Elastic Tubular Stocking    Tubular elastic bandage: Apply from base of toes to behind the knee. Apply in AM, may remove for sleep.    Avoid prolonged standing in one place.    Elevate leg(s) above the level of the heart when sitting or as much as possible.     Wound infection:  If you have signs of infection please call the wound center.  If the wound center is closed- please go to the Emergency department.  Some signs of infection:  fever, chills, increased redness, red streaks, increase in pain, increased drainage.  Drainage with an odor, Change in drainage color: white/milky/green/tan/yellow,  an increase in swelling, chest pain and/or shortness of breath.     Protein: Eat protein with each meal to promote healing.  Examples of protein are fish, meat, chicken, nuts, peanut butter, eggs, lentils, edamame or a protein shake.     Standing Status:   Future     Standing Expiration Date:   8/28/2024

## 2024-08-26 ENCOUNTER — OFFICE VISIT (OUTPATIENT)
Dept: OBGYN CLINIC | Facility: CLINIC | Age: 74
End: 2024-08-26
Payer: MEDICARE

## 2024-08-26 VITALS
WEIGHT: 156 LBS | HEART RATE: 60 BPM | SYSTOLIC BLOOD PRESSURE: 148 MMHG | BODY MASS INDEX: 28.71 KG/M2 | HEIGHT: 62 IN | DIASTOLIC BLOOD PRESSURE: 75 MMHG

## 2024-08-26 DIAGNOSIS — M17.11 PRIMARY OSTEOARTHRITIS OF RIGHT KNEE: Primary | ICD-10-CM

## 2024-08-26 PROCEDURE — 20610 DRAIN/INJ JOINT/BURSA W/O US: CPT

## 2024-08-26 PROCEDURE — 99213 OFFICE O/P EST LOW 20 MIN: CPT | Performed by: ORTHOPAEDIC SURGERY

## 2024-08-26 RX ORDER — ROPIVACAINE HYDROCHLORIDE 5 MG/ML
4 INJECTION, SOLUTION EPIDURAL; INFILTRATION; PERINEURAL
Status: COMPLETED | OUTPATIENT
Start: 2024-08-26 | End: 2024-08-26

## 2024-08-26 RX ORDER — TRIAMCINOLONE ACETONIDE 40 MG/ML
40 INJECTION, SUSPENSION INTRA-ARTICULAR; INTRAMUSCULAR
Status: COMPLETED | OUTPATIENT
Start: 2024-08-26 | End: 2024-08-26

## 2024-08-26 RX ADMIN — TRIAMCINOLONE ACETONIDE 40 MG: 40 INJECTION, SUSPENSION INTRA-ARTICULAR; INTRAMUSCULAR at 14:30

## 2024-08-26 RX ADMIN — ROPIVACAINE HYDROCHLORIDE 4 ML: 5 INJECTION, SOLUTION EPIDURAL; INFILTRATION; PERINEURAL at 14:30
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
## 2024-08-28 ENCOUNTER — OFFICE VISIT (OUTPATIENT)
Dept: WOUND CARE | Facility: HOSPITAL | Age: 74
End: 2024-08-28
Payer: OTHER MISCELLANEOUS

## 2024-08-28 VITALS
RESPIRATION RATE: 15 BRPM | HEART RATE: 64 BPM | SYSTOLIC BLOOD PRESSURE: 133 MMHG | TEMPERATURE: 97.4 F | DIASTOLIC BLOOD PRESSURE: 68 MMHG

## 2024-08-28 DIAGNOSIS — S81.802A TRAUMATIC OPEN WOUND OF LEFT LOWER LEG, INITIAL ENCOUNTER: Primary | ICD-10-CM

## 2024-08-28 PROCEDURE — 99214 OFFICE O/P EST MOD 30 MIN: CPT | Performed by: STUDENT IN AN ORGANIZED HEALTH CARE EDUCATION/TRAINING PROGRAM

## 2024-08-28 PROCEDURE — 99213 OFFICE O/P EST LOW 20 MIN: CPT | Performed by: STUDENT IN AN ORGANIZED HEALTH CARE EDUCATION/TRAINING PROGRAM

## 2024-08-28 NOTE — PROGRESS NOTES
Wound Procedure Treatment Traumatic Left Pretibial    Performed by: Danika Arnett RN  Authorized by: Sadie Farley MD    Associated wounds:   Wound 08/21/24 Traumatic Pretibial Left  Wound cleansed with:  NSS  Applied to periwound:  Moisture lotion  Applied primary dressing:  Polymem foam and Silver  Applied secondary dressing:  ABD  Dressing secured with:  Size F, Elastic tubular stocking, Sheridan and Tape

## 2024-08-28 NOTE — PROGRESS NOTES
Patient ID: Mirna Boswell is a 73 y.o. female Date of Birth 1950     Chief Complaint  Chief Complaint   Patient presents with    Follow Up Wound Care Visit     LLE       Allergies  Penicillins, Sulfa antibiotics, and Sulfanilamide    Assessment:     Diagnoses and all orders for this visit:    Traumatic open wound of left lower leg, initial encounter  -     Wound cleansing and dressings Traumatic Left Pretibial; Future    Other orders  -     Wound Procedure Treatment            Plan:   It was a pleasure to see Mirna Boswell for wound care follow up today  Patient declines any sharp debridement.  When debrided with saline and gauze only  Wound is improving   Continue plan of care as noted below with PolyMem Max Ag, spandi  for gentle compression  No signs or symptoms of infection today. Patient understands that if any signs of infection start (such as increased redness, drainage, pain, fever, chills, diaphoresis), they should call our office or proceed to the ER or Urgent Care.  Patient should continue a high protein diet to facilitate wound healing  Patient is advised to not submerge wound or leave wound open to air.  Follow up in 2 weeks  Given the multi-factorial nature of wound care, additional time was taken to review patient's treatment plan with other specialties and most recent pertinent lab work and imaging.   All plans of care discussed with patient at bedside who verbalized understanding with treatment plan.    Wound 08/21/24 Traumatic Pretibial Left (Active)   Wound Image Images linked 08/28/24 0933   Wound Description Pink;Yellow;Slough;Brown 08/28/24 0932   Corrie-wound Assessment Dry;Fragile 08/28/24 0932   Wound Length (cm) 3 cm 08/28/24 0932   Wound Width (cm) 4 cm 08/28/24 0932   Wound Depth (cm) 0.1 cm 08/28/24 0932   Wound Surface Area (cm^2) 12 cm^2 08/28/24 0932   Wound Volume (cm^3) 1.2 cm^3 08/28/24 0932   Calculated Wound Volume (cm^3) 1.2 cm^3 08/28/24 0932   Change in Wound Size %  43.66 08/28/24 0932   Drainage Amount Moderate 08/28/24 0932   Drainage Description Serosanguineous 08/28/24 0932   Non-staged Wound Description Full thickness 08/28/24 0932   Dressing Status Intact (upon arrival) 08/28/24 0932       Wound 08/21/24 Traumatic Pretibial Left (Active)   Date First Assessed/Time First Assessed: 08/21/24 0920   Primary Wound Type: Traumatic  Location: Pretibial  Wound Location Orientation: Left       [REMOVED] Wound 02/09/24 Back N/A (Removed)   Resolved Date: 08/21/24  Date First Assessed/Time First Assessed: 02/09/24 1424   Location: Back  Wound Location Orientation: N/A  Incision's 1st Dressing: BANDAID 1 X 3 IN LF  Wound Outcome: Unknown (No longer present)       Subjective:      .    8/28/24: Mirna states she was initially fall going instructions and applying PolyMem as directed however she ran out.  Presents without compression in place today but states she has been wearing it at home    8/21/24: Consult - Mirna is a pleasant 73-year-old female with a past medical history of hypertension, CAD, GERD, dysphagia, CKD stage II, dyslipidemia here today for wound care consult.  Patient was referred by [facility].  Was initially seen at [facility] ER on August 5 for left lower extremity laceration that occurred at work.  At that visit it was noted that she had significant edema and no attempt to reapproximate wound with sutures was made.  Instead pressure dressing was placed and patient advised to follow-up with PCP.  Since then she has gone to urgent care [facility].  Unfortunately I am unable to view records from this visit.  States that she is now on both Keflex and doxycycline following the results of wound culture.  Is not sure regarding the length of therapy for this antibiotic regimen.  Denies any symptoms of infection including fever chills diaphoresis.  Presents with an adherent dressing on the wound.  Is not wearing any compression.        The following  portions of the patient's history were reviewed and updated as appropriate: allergies, current medications, past family history, past medical history, past social history, past surgical history, and problem list.    Review of Systems   Constitutional:  Negative for chills, diaphoresis and fever.   Skin:  Positive for wound.   All other systems reviewed and are negative.        Objective:       Wound 08/21/24 Traumatic Pretibial Left (Active)   Wound Image Images linked 08/28/24 0933   Wound Description Pink;Yellow;Slough;Brown 08/28/24 0932   Corrie-wound Assessment Dry;Fragile 08/28/24 0932   Wound Length (cm) 3 cm 08/28/24 0932   Wound Width (cm) 4 cm 08/28/24 0932   Wound Depth (cm) 0.1 cm 08/28/24 0932   Wound Surface Area (cm^2) 12 cm^2 08/28/24 0932   Wound Volume (cm^3) 1.2 cm^3 08/28/24 0932   Calculated Wound Volume (cm^3) 1.2 cm^3 08/28/24 0932   Change in Wound Size % 43.66 08/28/24 0932   Drainage Amount Moderate 08/28/24 0932   Drainage Description Serosanguineous 08/28/24 0932   Non-staged Wound Description Full thickness 08/28/24 0932   Dressing Status Intact (upon arrival) 08/28/24 0932       /68   Pulse 64   Temp (!) 97.4 °F (36.3 °C)   Resp 15     Physical Exam  Vitals reviewed.   Constitutional:       Appearance: Normal appearance.   HENT:      Head: Normocephalic and atraumatic.      Mouth/Throat:      Mouth: Mucous membranes are moist.   Eyes:      Extraocular Movements: Extraocular movements intact.   Pulmonary:      Effort: Pulmonary effort is normal.   Musculoskeletal:      Left lower leg: Edema present.   Skin:     Comments: Distal anterior left lower extremity wound improved since last exam.  Measuring smaller today.  Still with slough and fibrin deposition in the wound bed.  Clean noted.  No signs of infection.   Neurological:      Mental Status: She is alert.   Psychiatric:         Mood and Affect: Mood normal.         Behavior: Behavior normal.           Wound  Instructions:  Orders Placed This Encounter   Procedures    Wound cleansing and dressings Traumatic Left Pretibial     Left leg wound:      Wash your hands with soap and water.  Remove old dressing, discard into plastic bag and place in trash.  Cleanse the wound with mild soap and water prior to applying a clean dressing. Do not use tissue or cotton balls. Do not scrub the wound. Pat dry using gauze.  Shower yes on dressing change days, remove dressing to shower. Cleanse last and rinse well. Do not leave open to air. Do not soak wound (No swimming, hot tubs, no soaks)  Apply moisturizer to skin surrounding wound  Apply polymem max ag (writing face up) to the open wound.  Cover with abd  Secure with rolled gauze and tape  Change dressing every other day.      Elastic Tubular Stocking     Tubular elastic bandage: Apply from base of toes to behind the knee. Apply in AM, may remove for sleep.     Avoid prolonged standing in one place.     Elevate leg(s) above the level of the heart when sitting or as much as possible.      Wound infection:  If you have signs of infection please call the wound center.  If the wound center is closed- please go to the Emergency department.  Some signs of infection:  fever, chills, increased redness, red streaks, increase in pain, increased drainage.  Drainage with an odor, Change in drainage color: white/milky/green/tan/yellow,  an increase in swelling, chest pain and/or shortness of breath.      Protein: Eat protein with each meal to promote healing.  Examples of protein are fish, meat, chicken, nuts, peanut butter, eggs, lentils, edamame or a protein shake.     Standing Status:   Future     Standing Expiration Date:   9/4/2024    Wound Procedure Treatment     This order was created via procedure documentation        Diagnosis ICD-10-CM Associated Orders   1. Traumatic open wound of left lower leg, initial encounter  S81.802A Wound cleansing and dressings Traumatic Left Pretibial     "      --  Sadie Farley MD    \"This note has been constructed using a voice recognition system. Therefore there may be syntax, spelling, and/or grammatical errors. Occasional wrong word or \"sound alike\" substitutions may have occurred due to the inherent limitations of voice recognition software. Read the chart carefully and recognize, using context, where substitutions have occurred. Please call if you have any questions.\"     "

## 2024-08-28 NOTE — PATIENT INSTRUCTIONS
Orders Placed This Encounter   Procedures    Wound cleansing and dressings Traumatic Left Pretibial     Left leg wound:      Wash your hands with soap and water.  Remove old dressing, discard into plastic bag and place in trash.  Cleanse the wound with mild soap and water prior to applying a clean dressing. Do not use tissue or cotton balls. Do not scrub the wound. Pat dry using gauze.  Shower yes on dressing change days, remove dressing to shower. Cleanse last and rinse well. Do not leave open to air. Do not soak wound (No swimming, hot tubs, no soaks)  Apply moisturizer to skin surrounding wound  Apply polymem max ag (writing face up) to the open wound.  Cover with abd  Secure with rolled gauze and tape  Change dressing every other day.      Elastic Tubular Stocking     Tubular elastic bandage: Apply from base of toes to behind the knee. Apply in AM, may remove for sleep.     Avoid prolonged standing in one place.     Elevate leg(s) above the level of the heart when sitting or as much as possible.      Wound infection:  If you have signs of infection please call the wound center.  If the wound center is closed- please go to the Emergency department.  Some signs of infection:  fever, chills, increased redness, red streaks, increase in pain, increased drainage.  Drainage with an odor, Change in drainage color: white/milky/green/tan/yellow,  an increase in swelling, chest pain and/or shortness of breath.      Protein: Eat protein with each meal to promote healing.  Examples of protein are fish, meat, chicken, nuts, peanut butter, eggs, lentils, edamame or a protein shake.     Standing Status:   Future     Standing Expiration Date:   9/4/2024

## 2024-09-03 ENCOUNTER — TELEPHONE (OUTPATIENT)
Dept: WOUND CARE | Facility: HOSPITAL | Age: 74
End: 2024-09-03

## 2024-09-03 NOTE — TELEPHONE ENCOUNTER
Returned patients call regarding supplies. Provided a direct number for supply company to obtain status of order. Center call back number provided.

## 2024-09-03 NOTE — TELEPHONE ENCOUNTER
Per Rajwinder additional information needed to process supply order. Made patient aware it will take some time for supplies to come. Offered other supply locations supplies can be purchased. Patient reports she is not needing supplies as she has decided to leave it open to air. Advised patient to keep wound covered until supplies come to prevent infection and assist in healing. Patient stated understanding.
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
## 2024-09-25 ENCOUNTER — TELEPHONE (OUTPATIENT)
Age: 74
End: 2024-09-25

## 2024-11-12 ENCOUNTER — TELEPHONE (OUTPATIENT)
Age: 74
End: 2024-11-12

## 2024-11-12 ENCOUNTER — NURSE TRIAGE (OUTPATIENT)
Age: 74
End: 2024-11-12

## 2024-11-12 DIAGNOSIS — K62.5 RECTAL BLEED: Primary | ICD-10-CM

## 2024-11-12 RX ORDER — HYDROCORTISONE ACETATE 25 MG/1
25 SUPPOSITORY RECTAL 2 TIMES DAILY
Qty: 24 SUPPOSITORY | Refills: 0 | Status: SHIPPED | OUTPATIENT
Start: 2024-11-12

## 2024-11-12 NOTE — TELEPHONE ENCOUNTER
Patient returned phone call. Call warm transferred to clinical team to review providers recommendations.

## 2024-11-12 NOTE — TELEPHONE ENCOUNTER
"SPOKE WITH PT, HX GERD, LAST OV 7/18/24, LAST CRS OV 11/3/22. PT REPORTS 2 EPISODES SMALL AMOUNT BRBPR ON TOILET TISSUE ONLY. PT ADMITS TO CONSTIPATION AT THE TIME, HAS SINCE RESOLVED. DENIES FEVER, CHILLS, N/V, BLACK STOOL, ABDOMINAL PAIN, RECTAL PAIN, SOB, DIZZINESS, LIGHTHEADEDNESS. ADVISED PT TO MONITOR AT THIS TIME, HIGH FIBER DIET, INCREASED HYDRATION. ANY ADDITIONAL RECOMMENDATIONS?          Reason for Disposition   Rectal bleeding is minimal (e.g., blood just on toilet paper, a few drops in toilet bowl)    Answer Assessment - Initial Assessment Questions  1. APPEARANCE of BLOOD: \"What color is it?\" \"Is it passed separately, on the surface of the stool, or mixed in with the stool?\"       BRBPR X 2 ON TOILET TISSUE  2. AMOUNT: \"How much blood was passed?\"       SMALL AMOUNT  3. FREQUENCY: \"How many times has blood been passed with the stools?\"       2 EPISODES  4. ONSET: \"When was the blood first seen in the stools?\" (Days or weeks)       2 EPISODES IN THE PAST 1 WEEK  5. DIARRHEA: \"Is there also some diarrhea?\" If Yes, ask: \"How many diarrhea stools in the past 24 hours?\"       DENIES  6. CONSTIPATION: \"Do you have constipation?\" If Yes, ask: \"How bad is it?\"      YES  7. RECURRENT SYMPTOMS: \"Have you had blood in your stools before?\" If Yes, ask: \"When was the last time?\" and \"What happened that time?\"       DENIES  8. BLOOD THINNERS: \"Do you take any blood thinners?\" (e.g., Coumadin/warfarin, Pradaxa/dabigatran, aspirin)      PLAVIX  9. OTHER SYMPTOMS: \"Do you have any other symptoms?\"  (e.g., abdomen pain, vomiting, dizziness, fever)      DENIES    Protocols used: Rectal Bleeding-Adult-[place]    "

## 2024-11-12 NOTE — TELEPHONE ENCOUNTER
Patient contacted office with complaints of constipation and rectal bleeding. Call transferred to nurse triage to further assist.

## 2024-11-21 ENCOUNTER — OFFICE VISIT (OUTPATIENT)
Age: 74
End: 2024-11-21
Payer: MEDICARE

## 2024-11-21 VITALS
TEMPERATURE: 97.7 F | DIASTOLIC BLOOD PRESSURE: 66 MMHG | OXYGEN SATURATION: 98 % | WEIGHT: 158 LBS | HEART RATE: 62 BPM | BODY MASS INDEX: 29.83 KG/M2 | HEIGHT: 61 IN | SYSTOLIC BLOOD PRESSURE: 146 MMHG

## 2024-11-21 DIAGNOSIS — I10 ESSENTIAL HYPERTENSION: ICD-10-CM

## 2024-11-21 DIAGNOSIS — Z78.0 ENCOUNTER FOR OSTEOPOROSIS SCREENING IN ASYMPTOMATIC POSTMENOPAUSAL PATIENT: ICD-10-CM

## 2024-11-21 DIAGNOSIS — E78.00 PURE HYPERCHOLESTEROLEMIA: ICD-10-CM

## 2024-11-21 DIAGNOSIS — I25.10 CORONARY ARTERY DISEASE INVOLVING NATIVE CORONARY ARTERY OF NATIVE HEART WITHOUT ANGINA PECTORIS: ICD-10-CM

## 2024-11-21 DIAGNOSIS — L03.115 CELLULITIS OF RIGHT LEG: Primary | ICD-10-CM

## 2024-11-21 DIAGNOSIS — Z13.820 ENCOUNTER FOR OSTEOPOROSIS SCREENING IN ASYMPTOMATIC POSTMENOPAUSAL PATIENT: ICD-10-CM

## 2024-11-21 PROCEDURE — 99214 OFFICE O/P EST MOD 30 MIN: CPT | Performed by: INTERNAL MEDICINE

## 2024-11-21 PROCEDURE — G2211 COMPLEX E/M VISIT ADD ON: HCPCS | Performed by: INTERNAL MEDICINE

## 2024-11-21 RX ORDER — DOXYCYCLINE 100 MG/1
100 CAPSULE ORAL EVERY 12 HOURS
Qty: 20 CAPSULE | Refills: 0 | Status: SHIPPED | OUTPATIENT
Start: 2024-11-21 | End: 2024-12-01

## 2024-11-21 RX ORDER — INDAPAMIDE 1.25 MG/1
1.25 TABLET ORAL EVERY MORNING
Qty: 30 TABLET | Refills: 5 | Status: SHIPPED | OUTPATIENT
Start: 2024-11-21

## 2024-11-21 NOTE — PROGRESS NOTES
Name: Mirna Boswell      : 1950      MRN: [id number]  Encounter Provider: Usama Jiang MD  Encounter Date: 2024   Encounter department: [facility]  :  Assessment & Plan  Cellulitis of right leg    Orders:    doxycycline hyclate (VIBRAMYCIN) 100 mg capsule; Take 1 capsule (100 mg total) by mouth every 12 (twelve) hours for 10 days    CBC and differential; Future    Essential hypertension  We will initiate indapamide 1.25 mg daily labs in 3 months.  Encouraged the patient to maintain a source of potassium repletion either through oranges orange juice or bananas.  Orders:    indapamide (LOZOL) 1.25 mg tablet; Take 1 tablet (1.25 mg total) by mouth every morning    CBC and differential; Future    Comprehensive metabolic panel; Future    Lipid panel; Future    Encounter for osteoporosis screening in asymptomatic postmenopausal patient  DEXA scan ordered  Orders:    DXA bone density spine hip and pelvis; Future    Coronary artery disease involving native coronary artery of native heart without angina pectoris  Continue Coreg 12.5 mg twice daily.  Orders:    Lipid panel; Future    Pure hypercholesterolemia  Follow-up lipid profile in February continue atorvastatin 20 mg daily.  Orders:    Lipid panel; Future           History of Present Illness     Patient presents to the office for follow-up visit for hypertension.  Her most recent labs were done on in early August and were reviewed.  She is complaining of some pain and discomfort on the right side of her face and neck.  He is concerned whether it was caused by a small aneurysm.  She had a small outpouching of her right internal carotid artery measuring approximately 2 mm but the image was somewhat degraded by motion.  Unlikely that this is the cause of her facial pain.  We discussed etiology of other types of facial pain such as trigeminal neuralgia.  She did have a very mild headache on the right side of her  "head earlier.  She states that the pain is not very significant at the moment it was stronger when she woke up.  She also complains of an area of soreness on her right lateral lower leg.  She was working in her garden and may have developed a scratch from a bush or something it appears to have caused a localized cellulitis.  She has no fever or chills.  There is some localized tenderness in her mid calf on the lateral aspect.  There is some mild erythema.  Her blood pressure was mildly elevated with systolic blood pressure 146 on recheck.  Her only medication at this time is carvedilol twice daily.  She had previously been on lisinopril, clonidine, valsartan and amlodipine, all of which have been discontinued for some untoward effects.  Her labs are reviewed.  No significant abnormalities were noted in her August labs other than a minimally elevated D-dimer at 0.73 a CK level of 199 and hemoglobin A1c of 5.8%.  She feels well she has not experienced any chest pains or dyspnea.  She denies any palpitations      Review of Systems   Constitutional: Negative.    HENT: Negative.     Eyes:  Positive for pain (Pain above her right eye).   Respiratory: Negative.     Cardiovascular:  Positive for leg swelling (Mild localized swelling of the lateral right lower leg).   Gastrointestinal: Negative.    Endocrine: Negative.    Genitourinary: Negative.    Musculoskeletal: Negative.    Allergic/Immunologic: Negative.    Neurological: Negative.    Hematological: Negative.    Psychiatric/Behavioral: Negative.            Objective   /66 (BP Location: Left arm, Patient Position: Sitting, Cuff Size: Standard)   Pulse 62   Temp 97.7 °F (36.5 °C) (Temporal)   Ht 5' 1.22\" (1.555 m)   Wt 71.7 kg (158 lb)   SpO2 98%   BMI 29.64 kg/m²      Physical Exam  Vitals reviewed.   Constitutional:       Appearance: Normal appearance. She is normal weight.   HENT:      Head: Normocephalic and atraumatic.      Right Ear: External ear normal. "      Left Ear: External ear normal.      Nose: Nose normal.      Mouth/Throat:      Mouth: Mucous membranes are moist.   Eyes:      General: No scleral icterus.     Conjunctiva/sclera: Conjunctivae normal.      Pupils: Pupils are equal, round, and reactive to light.   Cardiovascular:      Rate and Rhythm: Normal rate.   Pulmonary:      Effort: Pulmonary effort is normal. No respiratory distress.   Abdominal:      General: Abdomen is flat. There is no distension.   Musculoskeletal:         General: Tenderness (Right lower leg lateral mid calf) present.      Cervical back: Neck supple.      Right lower leg: No edema.      Left lower leg: No edema.   Skin:     General: Skin is warm.      Coloration: Skin is not jaundiced.      Findings: Erythema (Mild localized erythema of the right lateral aspect of her mid calf) present. No bruising or rash.   Neurological:      General: No focal deficit present.      Mental Status: She is alert and oriented to person, place, and time. Mental status is at baseline.      Comments: Concern for possible trigeminal neuralgia.  Will continue to monitor her right facial pain   Psychiatric:         Mood and Affect: Mood normal.         Behavior: Behavior normal.

## 2024-11-21 NOTE — ASSESSMENT & PLAN NOTE
Orders:  •  doxycycline hyclate (VIBRAMYCIN) 100 mg capsule; Take 1 capsule (100 mg total) by mouth every 12 (twelve) hours for 10 days  •  CBC and differential; Future

## 2024-11-21 NOTE — ASSESSMENT & PLAN NOTE
We will initiate indapamide 1.25 mg daily labs in 3 months.  Encouraged the patient to maintain a source of potassium repletion either through oranges orange juice or bananas.  Orders:    indapamide (LOZOL) 1.25 mg tablet; Take 1 tablet (1.25 mg total) by mouth every morning    CBC and differential; Future    Comprehensive metabolic panel; Future    Lipid panel; Future

## 2024-11-21 NOTE — ASSESSMENT & PLAN NOTE
Follow-up lipid profile in February continue atorvastatin 20 mg daily.  Orders:    Lipid panel; Future

## 2024-11-22 ENCOUNTER — APPOINTMENT (OUTPATIENT)
Dept: LAB | Facility: CLINIC | Age: 74
End: 2024-11-22
Payer: MEDICARE

## 2024-11-22 DIAGNOSIS — R79.1 ABNORMAL COAGULATION PROFILE: ICD-10-CM

## 2024-11-22 LAB
ANION GAP SERPL CALCULATED.3IONS-SCNC: 8 MMOL/L (ref 4–13)
APTT PPP: 32 SECONDS (ref 23–34)
BASOPHILS # BLD AUTO: 0.05 THOUSANDS/ÂΜL (ref 0–0.1)
BASOPHILS NFR BLD AUTO: 1 % (ref 0–1)
BUN SERPL-MCNC: 20 MG/DL (ref 5–25)
CALCIUM SERPL-MCNC: 9.1 MG/DL (ref 8.4–10.2)
CHLORIDE SERPL-SCNC: 108 MMOL/L (ref 96–108)
CO2 SERPL-SCNC: 27 MMOL/L (ref 21–32)
CREAT SERPL-MCNC: 0.99 MG/DL (ref 0.6–1.3)
EOSINOPHIL # BLD AUTO: 0.12 THOUSAND/ÂΜL (ref 0–0.61)
EOSINOPHIL NFR BLD AUTO: 2 % (ref 0–6)
ERYTHROCYTE [DISTWIDTH] IN BLOOD BY AUTOMATED COUNT: 13.3 % (ref 11.6–15.1)
GFR SERPL CREATININE-BSD FRML MDRD: 55 ML/MIN/1.73SQ M
GLUCOSE SERPL-MCNC: 95 MG/DL (ref 65–140)
HCT VFR BLD AUTO: 37.9 % (ref 34.8–46.1)
HGB BLD-MCNC: 12.1 G/DL (ref 11.5–15.4)
IMM GRANULOCYTES # BLD AUTO: 0.01 THOUSAND/UL (ref 0–0.2)
IMM GRANULOCYTES NFR BLD AUTO: 0 % (ref 0–2)
INR PPP: 1.06 (ref 0.85–1.19)
LYMPHOCYTES # BLD AUTO: 1.42 THOUSANDS/ÂΜL (ref 0.6–4.47)
LYMPHOCYTES NFR BLD AUTO: 21 % (ref 14–44)
MCH RBC QN AUTO: 28 PG (ref 26.8–34.3)
MCHC RBC AUTO-ENTMCNC: 31.9 G/DL (ref 31.4–37.4)
MCV RBC AUTO: 88 FL (ref 82–98)
MONOCYTES # BLD AUTO: 0.6 THOUSAND/ÂΜL (ref 0.17–1.22)
MONOCYTES NFR BLD AUTO: 9 % (ref 4–12)
NEUTROPHILS # BLD AUTO: 4.68 THOUSANDS/ÂΜL (ref 1.85–7.62)
NEUTS SEG NFR BLD AUTO: 67 % (ref 43–75)
NRBC BLD AUTO-RTO: 0 /100 WBCS
PLATELET # BLD AUTO: 292 THOUSANDS/UL (ref 149–390)
PMV BLD AUTO: 9.6 FL (ref 8.9–12.7)
POTASSIUM SERPL-SCNC: 4.5 MMOL/L (ref 3.5–5.3)
PROTHROMBIN TIME: 14.5 SECONDS (ref 12.3–15)
RBC # BLD AUTO: 4.32 MILLION/UL (ref 3.81–5.12)
SODIUM SERPL-SCNC: 143 MMOL/L (ref 135–147)
WBC # BLD AUTO: 6.88 THOUSAND/UL (ref 4.31–10.16)

## 2024-11-22 PROCEDURE — 36415 COLL VENOUS BLD VENIPUNCTURE: CPT

## 2024-11-22 PROCEDURE — 85730 THROMBOPLASTIN TIME PARTIAL: CPT

## 2024-11-22 PROCEDURE — 80048 BASIC METABOLIC PNL TOTAL CA: CPT

## 2024-11-22 PROCEDURE — 85610 PROTHROMBIN TIME: CPT

## 2024-11-22 PROCEDURE — 85025 COMPLETE CBC W/AUTO DIFF WBC: CPT

## 2024-11-25 ENCOUNTER — OFFICE VISIT (OUTPATIENT)
Age: 74
End: 2024-11-25
Payer: MEDICARE

## 2024-11-25 VITALS
OXYGEN SATURATION: 98 % | HEIGHT: 61 IN | HEART RATE: 60 BPM | DIASTOLIC BLOOD PRESSURE: 82 MMHG | BODY MASS INDEX: 29.76 KG/M2 | SYSTOLIC BLOOD PRESSURE: 150 MMHG | WEIGHT: 157.6 LBS | TEMPERATURE: 97.4 F

## 2024-11-25 DIAGNOSIS — I10 ESSENTIAL HYPERTENSION: ICD-10-CM

## 2024-11-25 DIAGNOSIS — R51.9 RIGHT SIDED TEMPORAL HEADACHE: Primary | ICD-10-CM

## 2024-11-25 PROCEDURE — G2211 COMPLEX E/M VISIT ADD ON: HCPCS | Performed by: NURSE PRACTITIONER

## 2024-11-25 PROCEDURE — 99214 OFFICE O/P EST MOD 30 MIN: CPT | Performed by: NURSE PRACTITIONER

## 2024-11-25 RX ORDER — GABAPENTIN 300 MG/1
300 CAPSULE ORAL DAILY
Qty: 30 CAPSULE | Refills: 0 | Status: SHIPPED | OUTPATIENT
Start: 2024-11-25

## 2024-11-25 NOTE — PROGRESS NOTES
"Name: Mirna Boswell      : 1950      MRN: [id number]  Encounter Provider: DANDRE Kelly  Encounter Date: 2024   Encounter department: [facility] [place]  :  Assessment & Plan  Right sided temporal headache  -Discussed with Dr. Jiang, will get CT of head  -Tylenol for discomfort  -Gabapentin at night  -Return to office if no relief of symptoms or if symptoms change    Orders:    CT head wo contrast; Future    gabapentin (NEURONTIN) 300 mg capsule; Take 1 capsule (300 mg total) by mouth daily    Essential hypertension  Start indapamide 1.25 mg daily as prescribed at last office visit                History of Present Illness     Patient presents today with ongoing right sided head pain and soreness at the edge of her eye.  She reports that it is not getting any better and it is keeping her up at night.  At last office visit she was started on doxycycline for cellulitis of right lower extremity  Blood pressure was elevated at last office visit and indapamide was started     She reports that she developed this pain about a week ago   Denies changes in her vision   Denies rash   Denies headaches   Denies tenderness to touch   Reports a throbbing pain   Denies pain to her ear   Reports some pain at the edge of her right eye       Note from last office visit with Dr. Jiang:  \"She has complaints of some pain and discomfort on the right side of her face and neck.  She is concerned whether it was caused by a small aneurysm.  She had a small outpouching of her right internal carotid artery measuring approximately 2 mm but the image was somewhat degraded by motion.  Unlikely that this is the cause of her facial pain.  We discussed etiology of other types of facial pain such as trigeminal neuralgia.  She did have a very mild headache on the right side of her head earlier.  She states that the pain is not very significant at the moment it was stronger when she " "woke up. \"      Review of Systems   Constitutional:  Negative for activity change, appetite change, chills, diaphoresis and fever.   HENT:  Negative for congestion, ear discharge, ear pain, postnasal drip, rhinorrhea, sinus pressure, sinus pain and sore throat.    Eyes:  Positive for pain (above right eye). Negative for discharge, itching and visual disturbance.   Respiratory:  Negative for cough, chest tightness, shortness of breath and wheezing.    Cardiovascular:  Negative for chest pain, palpitations and leg swelling.   Gastrointestinal:  Negative for abdominal pain, constipation, diarrhea, nausea and vomiting.   Endocrine: Negative for polydipsia, polyphagia and polyuria.   Genitourinary:  Negative for difficulty urinating, dysuria and urgency.   Musculoskeletal:  Negative for arthralgias, back pain and neck pain.   Skin:  Negative for rash and wound.   Neurological:  Negative for dizziness, weakness, numbness and headaches.       Objective   /82 (BP Location: Right arm, Patient Position: Sitting, Cuff Size: Standard)   Pulse 60   Temp (!) 97.4 °F (36.3 °C) (Temporal)   Ht 5' 1.26\" (1.556 m)   Wt 71.5 kg (157 lb 9.6 oz)   SpO2 98%   BMI 29.53 kg/m²      Physical Exam  Constitutional:       General: She is not in acute distress.     Appearance: She is well-developed. She is not diaphoretic.   HENT:      Head: Normocephalic and atraumatic.      Right Ear: External ear normal.      Left Ear: External ear normal.      Nose: Nose normal.      Mouth/Throat:      Mouth: Mucous membranes are moist.      Pharynx: No oropharyngeal exudate or posterior oropharyngeal erythema.   Eyes:      General:         Right eye: No discharge.         Left eye: No discharge.      Conjunctiva/sclera: Conjunctivae normal.      Pupils: Pupils are equal, round, and reactive to light.   Neck:      Thyroid: No thyromegaly.   Cardiovascular:      Rate and Rhythm: Normal rate and regular rhythm.      Heart sounds: Normal heart " sounds. No murmur heard.     No friction rub. No gallop.   Pulmonary:      Effort: Pulmonary effort is normal. No respiratory distress.      Breath sounds: Normal breath sounds. No stridor. No wheezing or rales.   Abdominal:      General: Bowel sounds are normal. There is no distension.      Palpations: Abdomen is soft.      Tenderness: There is no abdominal tenderness.   Musculoskeletal:      Cervical back: Normal range of motion and neck supple.   Lymphadenopathy:      Cervical: No cervical adenopathy.   Skin:     General: Skin is warm and dry.      Findings: No erythema or rash.   Neurological:      Mental Status: She is alert and oriented to person, place, and time.   Psychiatric:         Behavior: Behavior normal.         Thought Content: Thought content normal.         Judgment: Judgment normal.

## 2024-11-25 NOTE — ASSESSMENT & PLAN NOTE
-Discussed with Dr. Jiang, will get CT of head  -Tylenol for discomfort  -Gabapentin at night  -Return to office if no relief of symptoms or if symptoms change    Orders:    CT head wo contrast; Future    gabapentin (NEURONTIN) 300 mg capsule; Take 1 capsule (300 mg total) by mouth daily    
Start indapamide 1.25 mg daily as prescribed at last office visit         
Helene Berman(PA)

## 2024-11-27 ENCOUNTER — TELEPHONE (OUTPATIENT)
Dept: PAIN MEDICINE | Facility: CLINIC | Age: 74
End: 2024-11-27

## 2024-12-02 ENCOUNTER — HOSPITAL ENCOUNTER (OUTPATIENT)
Dept: CT IMAGING | Facility: HOSPITAL | Age: 74
Discharge: HOME | End: 2024-12-02
Payer: MEDICARE

## 2024-12-02 DIAGNOSIS — R51.9 RIGHT SIDED TEMPORAL HEADACHE: ICD-10-CM

## 2024-12-02 PROCEDURE — 70450 CT HEAD/BRAIN W/O DYE: CPT

## 2024-12-02 NOTE — PROGRESS NOTES
Anesthesia Post Evaluation    Patient: Ivy Salgado    Procedure(s) Performed: Procedure(s) (LRB):  ILEOSCOPY (N/A)    Final Anesthesia Type: general      Patient location during evaluation: GI PACU  Patient participation: Yes- Able to Participate  Level of consciousness: awake and alert  Post-procedure vital signs: reviewed and stable  Pain management: adequate  Airway patency: patent    PONV status at discharge: No PONV  Anesthetic complications: no      Cardiovascular status: hemodynamically stable  Respiratory status: spontaneous ventilation and unassisted  Hydration status: euvolemic  Follow-up not needed.              Vitals Value Taken Time   /73 12/02/24 1112   Temp 36.5 °C (97.7 °F) 12/02/24 1057   Pulse 67 12/02/24 1112   Resp 16 12/02/24 1112   SpO2 100 % 12/02/24 1112         No case tracking events are documented in the log.      Pain/Michael Score: Michael Score: 8 (12/2/2024 11:12 AM)           Wound Procedure Treatment Traumatic Left Pretibial    Performed by: Britta Evans RN  Authorized by: Sadie Farley MD    Associated wounds:   Wound 08/21/24 Traumatic Pretibial Left  Wound cleansed with:  NSS  Applied to periwound:  Moisture lotion  Applied primary dressing:  Polymem foam and Silver  Applied secondary dressing:  ABD  Dressing secured with:  Size F, Tape and [place]

## 2024-12-03 ENCOUNTER — RESULTS FOLLOW-UP (OUTPATIENT)
Age: 74
End: 2024-12-03

## 2024-12-03 DIAGNOSIS — J01.00 ACUTE NON-RECURRENT MAXILLARY SINUSITIS: Primary | ICD-10-CM

## 2024-12-03 RX ORDER — DOXYCYCLINE HYCLATE 100 MG
100 TABLET ORAL 2 TIMES DAILY
Qty: 14 TABLET | Refills: 0 | Status: SHIPPED | OUTPATIENT
Start: 2024-12-03 | End: 2024-12-10

## 2024-12-16 ENCOUNTER — OFFICE VISIT (OUTPATIENT)
Dept: PAIN MEDICINE | Facility: CLINIC | Age: 74
End: 2024-12-16
Payer: MEDICARE

## 2024-12-16 VITALS
DIASTOLIC BLOOD PRESSURE: 66 MMHG | HEART RATE: 62 BPM | HEIGHT: 61 IN | WEIGHT: 158 LBS | SYSTOLIC BLOOD PRESSURE: 108 MMHG | BODY MASS INDEX: 29.83 KG/M2

## 2024-12-16 DIAGNOSIS — M54.12 CERVICAL RADICULOPATHY: Primary | ICD-10-CM

## 2024-12-16 DIAGNOSIS — G89.4 CHRONIC PAIN SYNDROME: ICD-10-CM

## 2024-12-16 DIAGNOSIS — M50.120 CERVICAL DISC DISORDER WITH RADICULOPATHY OF MID-CERVICAL REGION: ICD-10-CM

## 2024-12-16 PROCEDURE — G2211 COMPLEX E/M VISIT ADD ON: HCPCS | Performed by: STUDENT IN AN ORGANIZED HEALTH CARE EDUCATION/TRAINING PROGRAM

## 2024-12-16 PROCEDURE — 99214 OFFICE O/P EST MOD 30 MIN: CPT | Performed by: STUDENT IN AN ORGANIZED HEALTH CARE EDUCATION/TRAINING PROGRAM

## 2024-12-16 RX ORDER — LOSARTAN POTASSIUM AND HYDROCHLOROTHIAZIDE 25; 100 MG/1; MG/1
1 TABLET ORAL DAILY
COMMUNITY
Start: 2024-12-12 | End: 2025-03-12

## 2024-12-16 RX ORDER — TIZANIDINE 2 MG/1
2 TABLET ORAL EVERY 8 HOURS PRN
Qty: 90 TABLET | Refills: 0 | Status: SHIPPED | OUTPATIENT
Start: 2024-12-16 | End: 2025-01-15

## 2024-12-16 NOTE — PATIENT INSTRUCTIONS
-Davinci Tool for Neck pain  -Can consider epidural injection in the neck along with facet injections for neck pain in the future  -Will start tizanidine (muscle relaxant) can use up to three times a day, start with nighttime dosing  -Continue gabapentin 300 mg at nighttime
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
## 2025-01-08 ENCOUNTER — TELEMEDICINE (OUTPATIENT)
Age: 75
End: 2025-01-08
Payer: MEDICARE

## 2025-01-08 VITALS
HEART RATE: 90 BPM | BODY MASS INDEX: 29.6 KG/M2 | WEIGHT: 158 LBS | DIASTOLIC BLOOD PRESSURE: 75 MMHG | SYSTOLIC BLOOD PRESSURE: 103 MMHG

## 2025-01-08 DIAGNOSIS — Z13.820 ENCOUNTER FOR OSTEOPOROSIS SCREENING IN ASYMPTOMATIC POSTMENOPAUSAL PATIENT: Primary | ICD-10-CM

## 2025-01-08 DIAGNOSIS — Z78.0 ENCOUNTER FOR OSTEOPOROSIS SCREENING IN ASYMPTOMATIC POSTMENOPAUSAL PATIENT: Primary | ICD-10-CM

## 2025-01-08 DIAGNOSIS — A08.4 VIRAL GASTROENTERITIS: ICD-10-CM

## 2025-01-08 PROCEDURE — 99213 OFFICE O/P EST LOW 20 MIN: CPT | Performed by: INTERNAL MEDICINE

## 2025-01-08 RX ORDER — ONDANSETRON 4 MG/1
4 TABLET, ORALLY DISINTEGRATING ORAL EVERY 8 HOURS PRN
Qty: 10 TABLET | Refills: 1 | Status: SHIPPED | OUTPATIENT
Start: 2025-01-08

## 2025-01-08 NOTE — PROGRESS NOTES
"Virtual Regular Visit  Name: Mirna Boswell      : 1950      MRN: [id number]  Encounter Provider: Usama Jiang MD  Encounter Date: 2025   Encounter department: [facility]      Verification of patient location:  Patient is located at Home in the following state in which I hold an active license [place] :  Assessment & Plan  Encounter for osteoporosis screening in asymptomatic postmenopausal patient    Orders:    DXA bone density spine hip and pelvis; Future    Viral gastroenteritis    Orders:    ondansetron (ZOFRAN-ODT) 4 mg disintegrating tablet; Take 1 tablet (4 mg total) by mouth every 8 (eight) hours as needed for nausea or vomiting        Encounter provider Usama Jiang MD    The patient was identified by name and date of birth. Mirna Boswell was informed that this is a telemedicine visit and that the visit is being conducted through the [employer] platform. She agrees to proceed..  My office door was closed. No one else was in the room.  She acknowledged consent and understanding of privacy and security of the video platform. The patient has agreed to participate and understands they can discontinue the visit at any time.    Patient is aware this is a billable service.     History of Present Illness     The patient is seen virtually because of an acute viral enteritis associated with multiple episodes of nausea vomiting and diarrhea.  She denies any fever but she just feels \"awful.\"  She denies any abdominal pain.  She has had no coughing or dyspnea.  Symptoms began last night with vomiting and diarrhea this morning.  Her blood pressure is stable she is little tachycardic but without any complaints of chest discomfort.  The patient does not report any bleeding with her diarrhea.  She is tolerating oral fluids in the form of ginger ale.      Review of Systems   Constitutional:  Positive for appetite change (Decreased) and fatigue. Negative for " diaphoresis.   HENT: Negative.     Eyes: Negative.    Respiratory: Negative.     Cardiovascular: Negative.    Gastrointestinal:  Positive for diarrhea, nausea and vomiting.   Genitourinary: Negative.    Musculoskeletal: Negative.    All other systems reviewed and are negative.      Objective   /75   Pulse 90   Wt 71.7 kg (158 lb)   BMI 29.60 kg/m²     Physical Exam  Vitals reviewed.   Constitutional:       General: She is not in acute distress.     Appearance: Normal appearance. She is not ill-appearing, toxic-appearing or diaphoretic.   HENT:      Head: Normocephalic and atraumatic.      Right Ear: External ear normal.      Left Ear: External ear normal.      Nose: Nose normal.      Mouth/Throat:      Mouth: Mucous membranes are moist.   Eyes:      General: No scleral icterus.     Conjunctiva/sclera: Conjunctivae normal.      Pupils: Pupils are equal, round, and reactive to light.   Cardiovascular:      Rate and Rhythm: Normal rate and regular rhythm.   Pulmonary:      Effort: Pulmonary effort is normal. No respiratory distress.   Abdominal:      General: Abdomen is flat. There is no distension.   Musculoskeletal:      Cervical back: Neck supple.      Right lower leg: No edema.      Left lower leg: No edema.   Skin:     Coloration: Skin is not jaundiced.      Findings: No bruising, erythema or rash.   Neurological:      General: No focal deficit present.      Mental Status: She is alert. Mental status is at baseline.   Psychiatric:         Mood and Affect: Mood normal.         Behavior: Behavior normal.         Visit Time  Total Visit Duration: 5 minutes face-to-face time and 7 minutes charting time

## 2025-01-08 NOTE — ASSESSMENT & PLAN NOTE
Orders:    ondansetron (ZOFRAN-ODT) 4 mg disintegrating tablet; Take 1 tablet (4 mg total) by mouth every 8 (eight) hours as needed for nausea or vomiting

## 2025-01-09 ENCOUNTER — APPOINTMENT (EMERGENCY)
Dept: CT IMAGING | Facility: HOSPITAL | Age: 75
DRG: 641 | End: 2025-01-09
Payer: MEDICARE

## 2025-01-09 ENCOUNTER — TELEPHONE (OUTPATIENT)
Age: 75
End: 2025-01-09

## 2025-01-09 ENCOUNTER — APPOINTMENT (INPATIENT)
Dept: NON INVASIVE DIAGNOSTICS | Facility: HOSPITAL | Age: 75
DRG: 641 | End: 2025-01-09
Payer: MEDICARE

## 2025-01-09 ENCOUNTER — HOSPITAL ENCOUNTER (INPATIENT)
Facility: HOSPITAL | Age: 75
LOS: 1 days | Discharge: HOME | DRG: 641 | End: 2025-01-10
Attending: EMERGENCY MEDICINE
Payer: MEDICARE

## 2025-01-09 DIAGNOSIS — E83.42 HYPOMAGNESEMIA: ICD-10-CM

## 2025-01-09 DIAGNOSIS — S22.42XA MULTIPLE FRACTURES OF RIBS, LEFT SIDE, INITIAL ENCOUNTER FOR CLOSED FRACTURE: ICD-10-CM

## 2025-01-09 DIAGNOSIS — R19.7 NAUSEA, VOMITING, AND DIARRHEA: ICD-10-CM

## 2025-01-09 DIAGNOSIS — N17.9 AKI (ACUTE KIDNEY INJURY) (HCC): ICD-10-CM

## 2025-01-09 DIAGNOSIS — I25.10 CORONARY ARTERY DISEASE INVOLVING NATIVE CORONARY ARTERY OF NATIVE HEART WITHOUT ANGINA PECTORIS: ICD-10-CM

## 2025-01-09 DIAGNOSIS — R07.89 CHEST TIGHTNESS: ICD-10-CM

## 2025-01-09 DIAGNOSIS — R11.2 NAUSEA, VOMITING, AND DIARRHEA: ICD-10-CM

## 2025-01-09 DIAGNOSIS — R42 POSTURAL DIZZINESS WITH PRESYNCOPE: Primary | ICD-10-CM

## 2025-01-09 DIAGNOSIS — R55 POSTURAL DIZZINESS WITH PRESYNCOPE: Primary | ICD-10-CM

## 2025-01-09 PROBLEM — R26.2 AMBULATORY DYSFUNCTION: Status: ACTIVE | Noted: 2025-01-09

## 2025-01-09 PROBLEM — S22.41XA FRACTURE OF MULTIPLE RIBS OF RIGHT SIDE: Status: ACTIVE | Noted: 2025-01-09

## 2025-01-09 LAB
2HR DELTA HS TROPONIN: -1 NG/L
ABO GROUP BLD: NORMAL
ALBUMIN SERPL BCG-MCNC: 3.9 G/DL (ref 3.5–5)
ALP SERPL-CCNC: 75 U/L (ref 34–104)
ALT SERPL W P-5'-P-CCNC: 21 U/L (ref 7–52)
ANION GAP SERPL CALCULATED.3IONS-SCNC: 12 MMOL/L (ref 4–13)
AORTIC ROOT: 2.1 CM
AST SERPL W P-5'-P-CCNC: 28 U/L (ref 13–39)
ATRIAL RATE: 71 BPM
BASOPHILS # BLD AUTO: 0.01 THOUSANDS/ΜL (ref 0–0.1)
BASOPHILS NFR BLD AUTO: 0 % (ref 0–1)
BILIRUB DIRECT SERPL-MCNC: 0.09 MG/DL (ref 0–0.2)
BILIRUB SERPL-MCNC: 0.73 MG/DL (ref 0.2–1)
BLD GP AB SCN SERPL QL: NEGATIVE
BSA FOR ECHO PROCEDURE: 1.71 M2
BUN SERPL-MCNC: 26 MG/DL (ref 5–25)
CALCIUM SERPL-MCNC: 8.9 MG/DL (ref 8.4–10.2)
CARDIAC TROPONIN I PNL SERPL HS: 6 NG/L (ref ?–50)
CARDIAC TROPONIN I PNL SERPL HS: 7 NG/L (ref ?–50)
CHLORIDE SERPL-SCNC: 104 MMOL/L (ref 96–108)
CO2 SERPL-SCNC: 20 MMOL/L (ref 21–32)
CREAT SERPL-MCNC: 1.46 MG/DL (ref 0.6–1.3)
E WAVE DECELERATION TIME: 228 MS
E/A RATIO: 1.17
EOSINOPHIL # BLD AUTO: 0.01 THOUSAND/ΜL (ref 0–0.61)
EOSINOPHIL NFR BLD AUTO: 0 % (ref 0–6)
ERYTHROCYTE [DISTWIDTH] IN BLOOD BY AUTOMATED COUNT: 13.5 % (ref 11.6–15.1)
FLUAV RNA RESP QL NAA+PROBE: NEGATIVE
FLUBV RNA RESP QL NAA+PROBE: NEGATIVE
FRACTIONAL SHORTENING: 31 (ref 28–44)
GFR SERPL CREATININE-BSD FRML MDRD: 34 ML/MIN/1.73SQ M
GLUCOSE SERPL-MCNC: 140 MG/DL (ref 65–140)
GLUCOSE SERPL-MCNC: 144 MG/DL (ref 65–140)
HCT VFR BLD AUTO: 38.9 % (ref 34.8–46.1)
HGB BLD-MCNC: 12.5 G/DL (ref 11.5–15.4)
IMM GRANULOCYTES # BLD AUTO: 0.03 THOUSAND/UL (ref 0–0.2)
IMM GRANULOCYTES NFR BLD AUTO: 1 % (ref 0–2)
INTERVENTRICULAR SEPTUM IN DIASTOLE (PARASTERNAL SHORT AXIS VIEW): 0.9 CM
INTERVENTRICULAR SEPTUM: 0.9 CM (ref 0.6–1.1)
LAAS-AP2: 20 CM2
LAAS-AP4: 19.9 CM2
LEFT ATRIUM SIZE: 3.6 CM
LEFT ATRIUM VOLUME (MOD BIPLANE): 62 ML
LEFT ATRIUM VOLUME INDEX (MOD BIPLANE): 36.3 ML/M2
LEFT INTERNAL DIMENSION IN SYSTOLE: 2.9 CM (ref 2.1–4)
LEFT VENTRICULAR INTERNAL DIMENSION IN DIASTOLE: 4.2 CM (ref 3.5–6)
LEFT VENTRICULAR POSTERIOR WALL IN END DIASTOLE: 0.9 CM
LEFT VENTRICULAR STROKE VOLUME: 47 ML
LVSV (TEICH): 47 ML
LYMPHOCYTES # BLD AUTO: 0.32 THOUSANDS/ΜL (ref 0.6–4.47)
LYMPHOCYTES NFR BLD AUTO: 7 % (ref 14–44)
MAGNESIUM SERPL-MCNC: 1.7 MG/DL (ref 1.9–2.7)
MCH RBC QN AUTO: 27.8 PG (ref 26.8–34.3)
MCHC RBC AUTO-ENTMCNC: 32.1 G/DL (ref 31.4–37.4)
MCV RBC AUTO: 86 FL (ref 82–98)
MONOCYTES # BLD AUTO: 0.51 THOUSAND/ΜL (ref 0.17–1.22)
MONOCYTES NFR BLD AUTO: 10 % (ref 4–12)
MV E'TISSUE VEL-SEP: 13 CM/S
MV PEAK A VEL: 0.92 M/S
MV PEAK E VEL: 108 CM/S
MV STENOSIS PRESSURE HALF TIME: 66 MS
MV VALVE AREA P 1/2 METHOD: 3.33
NEUTROPHILS # BLD AUTO: 4.05 THOUSANDS/ΜL (ref 1.85–7.62)
NEUTS SEG NFR BLD AUTO: 82 % (ref 43–75)
NRBC BLD AUTO-RTO: 0 /100 WBCS
P AXIS: 71 DEGREES
PHOSPHATE SERPL-MCNC: 3.2 MG/DL (ref 2.3–4.1)
PLATELET # BLD AUTO: 209 THOUSANDS/UL (ref 149–390)
PMV BLD AUTO: 9.7 FL (ref 8.9–12.7)
POTASSIUM SERPL-SCNC: 4 MMOL/L (ref 3.5–5.3)
PR INTERVAL: 170 MS
PROT SERPL-MCNC: 6.9 G/DL (ref 6.4–8.4)
QRS AXIS: 73 DEGREES
QRSD INTERVAL: 84 MS
QT INTERVAL: 402 MS
QTC INTERVAL: 436 MS
RA PRESSURE ESTIMATED: 3 MMHG
RBC # BLD AUTO: 4.5 MILLION/UL (ref 3.81–5.12)
RH BLD: POSITIVE
RIGHT ATRIAL 2D VOLUME: 41 ML
RIGHT ATRIUM AREA SYSTOLE A4C: 15.2 CM2
RIGHT VENTRICLE ID DIMENSION: 3.6 CM
RSV RNA RESP QL NAA+PROBE: NEGATIVE
RV PSP: 32 MMHG
SARS-COV-2 RNA RESP QL NAA+PROBE: NEGATIVE
SL CV LEFT ATRIUM LENGTH A2C: 5.2 CM
SL CV LV EF: 65
SL CV PED ECHO LEFT VENTRICLE DIASTOLIC VOLUME (MOD BIPLANE) 2D: 80 ML
SL CV PED ECHO LEFT VENTRICLE SYSTOLIC VOLUME (MOD BIPLANE) 2D: 32 ML
SODIUM SERPL-SCNC: 136 MMOL/L (ref 135–147)
SPECIMEN EXPIRATION DATE: NORMAL
T WAVE AXIS: 58 DEGREES
TR MAX PG: 29 MMHG
TR PEAK VELOCITY: 2.7 M/S
TRICUSPID ANNULAR PLANE SYSTOLIC EXCURSION: 2.5 CM
TRICUSPID VALVE PEAK REGURGITATION VELOCITY: 2.72 M/S
TSH SERPL DL<=0.05 MIU/L-ACNC: 1.19 UIU/ML (ref 0.45–4.5)
VENTRICULAR RATE: 71 BPM
WBC # BLD AUTO: 4.93 THOUSAND/UL (ref 4.31–10.16)

## 2025-01-09 PROCEDURE — 83735 ASSAY OF MAGNESIUM: CPT | Performed by: EMERGENCY MEDICINE

## 2025-01-09 PROCEDURE — 94664 DEMO&/EVAL PT USE INHALER: CPT

## 2025-01-09 PROCEDURE — 93306 TTE W/DOPPLER COMPLETE: CPT | Performed by: INTERNAL MEDICINE

## 2025-01-09 PROCEDURE — 0241U HB NFCT DS VIR RESP RNA 4 TRGT: CPT

## 2025-01-09 PROCEDURE — 96360 HYDRATION IV INFUSION INIT: CPT

## 2025-01-09 PROCEDURE — 82948 REAGENT STRIP/BLOOD GLUCOSE: CPT

## 2025-01-09 PROCEDURE — 85025 COMPLETE CBC W/AUTO DIFF WBC: CPT | Performed by: EMERGENCY MEDICINE

## 2025-01-09 PROCEDURE — 99285 EMERGENCY DEPT VISIT HI MDM: CPT

## 2025-01-09 PROCEDURE — 36415 COLL VENOUS BLD VENIPUNCTURE: CPT | Performed by: EMERGENCY MEDICINE

## 2025-01-09 PROCEDURE — 84443 ASSAY THYROID STIM HORMONE: CPT

## 2025-01-09 PROCEDURE — 84100 ASSAY OF PHOSPHORUS: CPT | Performed by: EMERGENCY MEDICINE

## 2025-01-09 PROCEDURE — 93306 TTE W/DOPPLER COMPLETE: CPT

## 2025-01-09 PROCEDURE — 99222 1ST HOSP IP/OBS MODERATE 55: CPT | Performed by: INTERNAL MEDICINE

## 2025-01-09 PROCEDURE — 86901 BLOOD TYPING SEROLOGIC RH(D): CPT | Performed by: EMERGENCY MEDICINE

## 2025-01-09 PROCEDURE — 80076 HEPATIC FUNCTION PANEL: CPT | Performed by: EMERGENCY MEDICINE

## 2025-01-09 PROCEDURE — 71250 CT THORAX DX C-: CPT

## 2025-01-09 PROCEDURE — 86850 RBC ANTIBODY SCREEN: CPT | Performed by: EMERGENCY MEDICINE

## 2025-01-09 PROCEDURE — 84484 ASSAY OF TROPONIN QUANT: CPT | Performed by: EMERGENCY MEDICINE

## 2025-01-09 PROCEDURE — 72125 CT NECK SPINE W/O DYE: CPT

## 2025-01-09 PROCEDURE — 93005 ELECTROCARDIOGRAM TRACING: CPT

## 2025-01-09 PROCEDURE — 80048 BASIC METABOLIC PNL TOTAL CA: CPT | Performed by: EMERGENCY MEDICINE

## 2025-01-09 PROCEDURE — 99285 EMERGENCY DEPT VISIT HI MDM: CPT | Performed by: EMERGENCY MEDICINE

## 2025-01-09 PROCEDURE — 70450 CT HEAD/BRAIN W/O DYE: CPT

## 2025-01-09 PROCEDURE — 86900 BLOOD TYPING SEROLOGIC ABO: CPT | Performed by: EMERGENCY MEDICINE

## 2025-01-09 RX ORDER — METHOCARBAMOL 500 MG/1
500 TABLET, FILM COATED ORAL EVERY 6 HOURS PRN
Status: DISCONTINUED | OUTPATIENT
Start: 2025-01-09 | End: 2025-01-10 | Stop reason: HOSPADM

## 2025-01-09 RX ORDER — HEPARIN SODIUM 5000 [USP'U]/ML
5000 INJECTION, SOLUTION INTRAVENOUS; SUBCUTANEOUS EVERY 8 HOURS SCHEDULED
Status: DISCONTINUED | OUTPATIENT
Start: 2025-01-09 | End: 2025-01-10 | Stop reason: HOSPADM

## 2025-01-09 RX ORDER — SODIUM CHLORIDE, SODIUM GLUCONATE, SODIUM ACETATE, POTASSIUM CHLORIDE, MAGNESIUM CHLORIDE, SODIUM PHOSPHATE, DIBASIC, AND POTASSIUM PHOSPHATE .53; .5; .37; .037; .03; .012; .00082 G/100ML; G/100ML; G/100ML; G/100ML; G/100ML; G/100ML; G/100ML
100 INJECTION, SOLUTION INTRAVENOUS CONTINUOUS
Status: DISCONTINUED | OUTPATIENT
Start: 2025-01-09 | End: 2025-01-10

## 2025-01-09 RX ORDER — CLOPIDOGREL BISULFATE 75 MG/1
75 TABLET ORAL DAILY
Status: DISCONTINUED | OUTPATIENT
Start: 2025-01-09 | End: 2025-01-10 | Stop reason: HOSPADM

## 2025-01-09 RX ORDER — ACETAMINOPHEN 325 MG/1
975 TABLET ORAL EVERY 8 HOURS
Status: DISCONTINUED | OUTPATIENT
Start: 2025-01-09 | End: 2025-01-10 | Stop reason: HOSPADM

## 2025-01-09 RX ORDER — LIDOCAINE 50 MG/G
1 PATCH TOPICAL DAILY
Status: DISCONTINUED | OUTPATIENT
Start: 2025-01-10 | End: 2025-01-10 | Stop reason: HOSPADM

## 2025-01-09 RX ORDER — ACETAMINOPHEN 325 MG/1
975 TABLET ORAL ONCE
Status: COMPLETED | OUTPATIENT
Start: 2025-01-09 | End: 2025-01-09

## 2025-01-09 RX ORDER — PANTOPRAZOLE SODIUM 40 MG/1
40 TABLET, DELAYED RELEASE ORAL
Status: DISCONTINUED | OUTPATIENT
Start: 2025-01-09 | End: 2025-01-10 | Stop reason: HOSPADM

## 2025-01-09 RX ORDER — SODIUM CHLORIDE 9 MG/ML
3 INJECTION INTRAVENOUS
Status: DISCONTINUED | OUTPATIENT
Start: 2025-01-09 | End: 2025-01-10 | Stop reason: HOSPADM

## 2025-01-09 RX ORDER — ACETAMINOPHEN 325 MG/1
975 TABLET ORAL EVERY 8 HOURS
Status: DISCONTINUED | OUTPATIENT
Start: 2025-01-09 | End: 2025-01-09

## 2025-01-09 RX ORDER — SODIUM CHLORIDE, SODIUM GLUCONATE, SODIUM ACETATE, POTASSIUM CHLORIDE, MAGNESIUM CHLORIDE, SODIUM PHOSPHATE, DIBASIC, AND POTASSIUM PHOSPHATE .53; .5; .37; .037; .03; .012; .00082 G/100ML; G/100ML; G/100ML; G/100ML; G/100ML; G/100ML; G/100ML
1000 INJECTION, SOLUTION INTRAVENOUS ONCE
Status: COMPLETED | OUTPATIENT
Start: 2025-01-09 | End: 2025-01-09

## 2025-01-09 RX ORDER — ATORVASTATIN CALCIUM 20 MG/1
20 TABLET, FILM COATED ORAL DAILY
Status: DISCONTINUED | OUTPATIENT
Start: 2025-01-09 | End: 2025-01-10 | Stop reason: HOSPADM

## 2025-01-09 RX ORDER — CARVEDILOL 12.5 MG/1
12.5 TABLET ORAL 2 TIMES DAILY WITH MEALS
Status: DISCONTINUED | OUTPATIENT
Start: 2025-01-09 | End: 2025-01-10 | Stop reason: HOSPADM

## 2025-01-09 RX ORDER — MAGNESIUM SULFATE HEPTAHYDRATE 40 MG/ML
2 INJECTION, SOLUTION INTRAVENOUS ONCE
Status: COMPLETED | OUTPATIENT
Start: 2025-01-09 | End: 2025-01-09

## 2025-01-09 RX ORDER — LIDOCAINE 50 MG/G
1 PATCH TOPICAL ONCE
Status: COMPLETED | OUTPATIENT
Start: 2025-01-09 | End: 2025-01-09

## 2025-01-09 RX ORDER — ASPIRIN 81 MG/1
81 TABLET, CHEWABLE ORAL DAILY
Status: DISCONTINUED | OUTPATIENT
Start: 2025-01-09 | End: 2025-01-10 | Stop reason: HOSPADM

## 2025-01-09 RX ORDER — LANOLIN ALCOHOL/MO/W.PET/CERES
400 CREAM (GRAM) TOPICAL 2 TIMES DAILY
Status: DISCONTINUED | OUTPATIENT
Start: 2025-01-09 | End: 2025-01-10 | Stop reason: HOSPADM

## 2025-01-09 RX ADMIN — HEPARIN SODIUM 5000 UNITS: 5000 INJECTION, SOLUTION INTRAVENOUS; SUBCUTANEOUS at 15:52

## 2025-01-09 RX ADMIN — CLOPIDOGREL 75 MG: 75 TABLET ORAL at 09:08

## 2025-01-09 RX ADMIN — MAGNESIUM SULFATE HEPTAHYDRATE 2 G: 40 INJECTION, SOLUTION INTRAVENOUS at 10:24

## 2025-01-09 RX ADMIN — SODIUM CHLORIDE 1000 ML: 0.9 INJECTION, SOLUTION INTRAVENOUS at 02:16

## 2025-01-09 RX ADMIN — ACETAMINOPHEN 975 MG: 325 TABLET, FILM COATED ORAL at 18:30

## 2025-01-09 RX ADMIN — SODIUM CHLORIDE, SODIUM GLUCONATE, SODIUM ACETATE, POTASSIUM CHLORIDE, MAGNESIUM CHLORIDE, SODIUM PHOSPHATE, DIBASIC, AND POTASSIUM PHOSPHATE 100 ML/HR: .53; .5; .37; .037; .03; .012; .00082 INJECTION, SOLUTION INTRAVENOUS at 22:05

## 2025-01-09 RX ADMIN — SODIUM CHLORIDE, SODIUM GLUCONATE, SODIUM ACETATE, POTASSIUM CHLORIDE, MAGNESIUM CHLORIDE, SODIUM PHOSPHATE, DIBASIC, AND POTASSIUM PHOSPHATE 100 ML/HR: .53; .5; .37; .037; .03; .012; .00082 INJECTION, SOLUTION INTRAVENOUS at 04:55

## 2025-01-09 RX ADMIN — SODIUM CHLORIDE, SODIUM GLUCONATE, SODIUM ACETATE, POTASSIUM CHLORIDE, MAGNESIUM CHLORIDE, SODIUM PHOSPHATE, DIBASIC, AND POTASSIUM PHOSPHATE 1000 ML: .53; .5; .37; .037; .03; .012; .00082 INJECTION, SOLUTION INTRAVENOUS at 06:46

## 2025-01-09 RX ADMIN — HEPARIN SODIUM 5000 UNITS: 5000 INJECTION, SOLUTION INTRAVENOUS; SUBCUTANEOUS at 21:59

## 2025-01-09 RX ADMIN — LIDOCAINE 1 PATCH: 700 PATCH TOPICAL at 03:58

## 2025-01-09 RX ADMIN — PANTOPRAZOLE SODIUM 40 MG: 40 TABLET, DELAYED RELEASE ORAL at 09:08

## 2025-01-09 RX ADMIN — ACETAMINOPHEN 975 MG: 325 TABLET, FILM COATED ORAL at 03:58

## 2025-01-09 RX ADMIN — ATORVASTATIN CALCIUM 20 MG: 20 TABLET, FILM COATED ORAL at 09:08

## 2025-01-09 RX ADMIN — ASPIRIN 81 MG 81 MG: 81 TABLET ORAL at 09:08

## 2025-01-09 RX ADMIN — Medication 400 MG: at 09:08

## 2025-01-09 RX ADMIN — Medication 400 MG: at 18:30

## 2025-01-09 RX ADMIN — ACETAMINOPHEN 975 MG: 325 TABLET, FILM COATED ORAL at 10:31

## 2025-01-09 RX ADMIN — HEPARIN SODIUM 5000 UNITS: 5000 INJECTION, SOLUTION INTRAVENOUS; SUBCUTANEOUS at 09:08
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
## 2025-01-09 NOTE — ASSESSMENT & PLAN NOTE
S/p fall Ct subtle deformities of left anterior 5th through 7th ribs suspicious for acute nondisplaced fractures  Multimodal pain regimen   Scheduled tylenol, robaxin lidocaine patch   Patient opting to hold off on opioid medications at this time  Incentive spirometry  Ambulate as tolerated

## 2025-01-09 NOTE — ASSESSMENT & PLAN NOTE
Lab Results   Component Value Date    EGFR 34 01/09/2025    EGFR 55 11/22/2024    EGFR 50 07/11/2021    CREATININE 1.46 (H) 01/09/2025    CREATININE 0.99 11/22/2024    CREATININE 1.10 07/11/2021   Baseline creatinine appears to be 1.0-1.2 currently 1.46  Not quite meeting ABRAM criteria suspect elevation secondary to dehydration, nausea, vomiting, diarrhea  Will continue with IV fluids hold PTA losartan hydrochlorothiazide  Urinary retention protocol  Trend BMP daily

## 2025-01-09 NOTE — ASSESSMENT & PLAN NOTE
Recently seen by her PCP yesterday diagnosed with viral gastroenteritis.  Abdominal exam benign denies any pain  Patient continues to have watery loose stools, nausea, vomiting with poor p.o. intake  Obtain C. difficile and stool enteric panel  Continue IV fluids  Continue regular diet as tolerated

## 2025-01-09 NOTE — TELEPHONE ENCOUNTER
Patient called to notify PCP that she was experiencing dizziness yesterday and fell and injured ribs.  Patient is currently in hospital and wanted to make PCP aware.  She is concerned her Losartan may need to be adjusted when released.  Please notify PCP.
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
## 2025-01-09 NOTE — ASSESSMENT & PLAN NOTE
Patient reports 2 episodes of presyncope with subsequent fall.  She reports symptoms of shortness of breath, chest tightness, and feelings of blacking out prior to falling.  Denies LOC.  + Head strike on ASA and Plavix.  CT head without acute intracranial abnormalities  Patient noted to be hypotensive upon admission SBP in the 90s  EKG showing normal sinus rhythm.  Troponins negative  Suspect syncope in the setting of hypovolemia and decreased oral intake will need to rule out cardiac origin vs orthostasis vs medication induced.  Patient with decreased oral intake, nausea, vomiting, and diarrhea for 2 days.  Reports starting new antihypertensive therapy 2 weeks ago since then has been having lower than normal BPs although symptoms started 2 days ago  Plan  Obtain echocardiogram  Obtain orthostatic vital signs  Monitor on telemetry  Fall precautions  PT/OT consult

## 2025-01-09 NOTE — ASSESSMENT & PLAN NOTE
Hold PTA losartan/hydrochlorothiazide in the setting of elevated creatinine and hypotension  Continue PTA carvedilol twice daily  Monitor blood pressures closely  Avoid hypotension

## 2025-01-10 VITALS
WEIGHT: 158 LBS | RESPIRATION RATE: 25 BRPM | OXYGEN SATURATION: 96 % | BODY MASS INDEX: 29.83 KG/M2 | HEART RATE: 66 BPM | DIASTOLIC BLOOD PRESSURE: 71 MMHG | TEMPERATURE: 97.7 F | HEIGHT: 61 IN | SYSTOLIC BLOOD PRESSURE: 143 MMHG

## 2025-01-10 LAB
ALBUMIN SERPL BCG-MCNC: 3.2 G/DL (ref 3.5–5)
ALP SERPL-CCNC: 62 U/L (ref 34–104)
ALT SERPL W P-5'-P-CCNC: 17 U/L (ref 7–52)
ANION GAP SERPL CALCULATED.3IONS-SCNC: 7 MMOL/L (ref 4–13)
AST SERPL W P-5'-P-CCNC: 27 U/L (ref 13–39)
BASOPHILS # BLD AUTO: 0.02 THOUSANDS/ΜL (ref 0–0.1)
BASOPHILS # BLD AUTO: 0.02 THOUSANDS/ΜL (ref 0–0.1)
BASOPHILS NFR BLD AUTO: 1 % (ref 0–1)
BASOPHILS NFR BLD AUTO: 1 % (ref 0–1)
BILIRUB SERPL-MCNC: 0.32 MG/DL (ref 0.2–1)
BUN SERPL-MCNC: 25 MG/DL (ref 5–25)
CALCIUM ALBUM COR SERPL-MCNC: 8.5 MG/DL (ref 8.3–10.1)
CALCIUM SERPL-MCNC: 7.9 MG/DL (ref 8.4–10.2)
CHLORIDE SERPL-SCNC: 108 MMOL/L (ref 96–108)
CO2 SERPL-SCNC: 24 MMOL/L (ref 21–32)
CREAT SERPL-MCNC: 1.23 MG/DL (ref 0.6–1.3)
EOSINOPHIL # BLD AUTO: 0.07 THOUSAND/ΜL (ref 0–0.61)
EOSINOPHIL # BLD AUTO: 0.09 THOUSAND/ΜL (ref 0–0.61)
EOSINOPHIL NFR BLD AUTO: 3 % (ref 0–6)
EOSINOPHIL NFR BLD AUTO: 3 % (ref 0–6)
ERYTHROCYTE [DISTWIDTH] IN BLOOD BY AUTOMATED COUNT: 13.7 % (ref 11.6–15.1)
ERYTHROCYTE [DISTWIDTH] IN BLOOD BY AUTOMATED COUNT: 13.8 % (ref 11.6–15.1)
GFR SERPL CREATININE-BSD FRML MDRD: 42 ML/MIN/1.73SQ M
GLUCOSE SERPL-MCNC: 99 MG/DL (ref 65–140)
HCT VFR BLD AUTO: 33.5 % (ref 34.8–46.1)
HCT VFR BLD AUTO: 36.7 % (ref 34.8–46.1)
HGB BLD-MCNC: 10.8 G/DL (ref 11.5–15.4)
HGB BLD-MCNC: 11.9 G/DL (ref 11.5–15.4)
IMM GRANULOCYTES # BLD AUTO: 0 THOUSAND/UL (ref 0–0.2)
IMM GRANULOCYTES # BLD AUTO: 0.01 THOUSAND/UL (ref 0–0.2)
IMM GRANULOCYTES NFR BLD AUTO: 0 % (ref 0–2)
IMM GRANULOCYTES NFR BLD AUTO: 0 % (ref 0–2)
LYMPHOCYTES # BLD AUTO: 1.11 THOUSANDS/ΜL (ref 0.6–4.47)
LYMPHOCYTES # BLD AUTO: 1.16 THOUSANDS/ΜL (ref 0.6–4.47)
LYMPHOCYTES NFR BLD AUTO: 35 % (ref 14–44)
LYMPHOCYTES NFR BLD AUTO: 40 % (ref 14–44)
MAGNESIUM SERPL-MCNC: 2.6 MG/DL (ref 1.9–2.7)
MCH RBC QN AUTO: 27.9 PG (ref 26.8–34.3)
MCH RBC QN AUTO: 27.9 PG (ref 26.8–34.3)
MCHC RBC AUTO-ENTMCNC: 32.2 G/DL (ref 31.4–37.4)
MCHC RBC AUTO-ENTMCNC: 32.4 G/DL (ref 31.4–37.4)
MCV RBC AUTO: 86 FL (ref 82–98)
MCV RBC AUTO: 87 FL (ref 82–98)
MONOCYTES # BLD AUTO: 0.54 THOUSAND/ΜL (ref 0.17–1.22)
MONOCYTES # BLD AUTO: 0.55 THOUSAND/ΜL (ref 0.17–1.22)
MONOCYTES NFR BLD AUTO: 17 % (ref 4–12)
MONOCYTES NFR BLD AUTO: 19 % (ref 4–12)
NEUTROPHILS # BLD AUTO: 1.05 THOUSANDS/ΜL (ref 1.85–7.62)
NEUTROPHILS # BLD AUTO: 1.42 THOUSANDS/ΜL (ref 1.85–7.62)
NEUTS SEG NFR BLD AUTO: 37 % (ref 43–75)
NEUTS SEG NFR BLD AUTO: 44 % (ref 43–75)
NRBC BLD AUTO-RTO: 0 /100 WBCS
NRBC BLD AUTO-RTO: 0 /100 WBCS
PLATELET # BLD AUTO: 200 THOUSANDS/UL (ref 149–390)
PLATELET # BLD AUTO: 202 THOUSANDS/UL (ref 149–390)
PMV BLD AUTO: 9.1 FL (ref 8.9–12.7)
PMV BLD AUTO: 9.5 FL (ref 8.9–12.7)
POTASSIUM SERPL-SCNC: 3.8 MMOL/L (ref 3.5–5.3)
PROT SERPL-MCNC: 5.5 G/DL (ref 6.4–8.4)
RBC # BLD AUTO: 3.87 MILLION/UL (ref 3.81–5.12)
RBC # BLD AUTO: 4.26 MILLION/UL (ref 3.81–5.12)
SODIUM SERPL-SCNC: 139 MMOL/L (ref 135–147)
WBC # BLD AUTO: 2.85 THOUSAND/UL (ref 4.31–10.16)
WBC # BLD AUTO: 3.19 THOUSAND/UL (ref 4.31–10.16)

## 2025-01-10 PROCEDURE — 80053 COMPREHEN METABOLIC PANEL: CPT

## 2025-01-10 PROCEDURE — 85025 COMPLETE CBC W/AUTO DIFF WBC: CPT | Performed by: INTERNAL MEDICINE

## 2025-01-10 PROCEDURE — 36415 COLL VENOUS BLD VENIPUNCTURE: CPT

## 2025-01-10 PROCEDURE — 99239 HOSP IP/OBS DSCHRG MGMT >30: CPT | Performed by: INTERNAL MEDICINE

## 2025-01-10 PROCEDURE — 83735 ASSAY OF MAGNESIUM: CPT

## 2025-01-10 PROCEDURE — 85025 COMPLETE CBC W/AUTO DIFF WBC: CPT

## 2025-01-10 RX ORDER — LOSARTAN POTASSIUM 50 MG/1
50 TABLET ORAL DAILY
Status: DISCONTINUED | OUTPATIENT
Start: 2025-01-10 | End: 2025-01-10 | Stop reason: HOSPADM

## 2025-01-10 RX ORDER — CARVEDILOL 12.5 MG/1
6.25 TABLET ORAL 2 TIMES DAILY WITH MEALS
Qty: 30 TABLET | Refills: 0 | Status: SHIPPED | OUTPATIENT
Start: 2025-01-10 | End: 2025-01-14 | Stop reason: SDUPTHER

## 2025-01-10 RX ADMIN — LIDOCAINE 1 PATCH: 700 PATCH TOPICAL at 05:59

## 2025-01-10 NOTE — ASSESSMENT & PLAN NOTE
Hold PTA losartan/hydrochlorothiazide in the setting of elevated creatinine and hypotension  Continue PTA carvedilol 6.25 mg twice daily twice daily  Will restart Cozaar.  Patient does not want to take hydrochlorothiazide  Stable at discharge

## 2025-01-10 NOTE — DISCHARGE SUMMARY
Discharge Summary - Hospitalist   Name: Mirna Boswell 74 y.o. female I MRN: [id number]  Unit/Bed#: ED OVR 21 I Date of Admission: 1/9/2025   Date of Service: 1/10/2025 I Hospital Day: 1     Assessment & Plan  Pre-syncope with fall  Patient reports 2 episodes of presyncope with subsequent fall.  She reports symptoms of shortness of breath, chest tightness, and feelings of blacking out prior to falling.  Denies LOC.  + Head strike on ASA and Plavix.  CT head without acute intracranial abnormalities  Patient noted to be hypotensive upon admission SBP in the 90s  EKG showing normal sinus rhythm.  Troponins negative  Suspect syncope in the setting of hypovolemia and decreased oral intake will need to rule out cardiac origin vs orthostasis vs medication induced.  Patient with decreased oral intake, nausea, vomiting, and diarrhea for 2 days.  Reports starting new antihypertensive therapy 2 weeks ago since then has been having lower than normal BPs although symptoms started 2 days ago  Plan  Echocardiogram showed ejection fraction of 65% with normal diastolic function  Orthostatic vitals are normal  Monitor on telemetry  Fall precautions  Patient is feeling back to baseline and wants to go as she has an appointment with cardiology today.  Patient is recommended to repeat CBC in 3 days and follow-up with PCP  Nausea vomiting and diarrhea  Recently seen by her PCP yesterday diagnosed with viral gastroenteritis.  Abdominal exam benign denies any pain  Patient continues to have watery loose stools, nausea, vomiting with poor p.o. intake  Obtain C. difficile and stool enteric panel  Resolved  Continue regular diet as tolerated  Patient did not have any further nausea or diarrhea and unable to obtain stool samples.  Fracture of multiple ribs of left side  S/p fall Ct subtle deformities of left anterior 5th through 7th ribs suspicious for acute nondisplaced fractures  Multimodal pain regimen   Scheduled tylenol, robaxin lidocaine  patch   Patient opting to hold off on opioid medications at this time  Incentive spirometry  Ambulate as tolerated  CKD (chronic kidney disease) stage 2, GFR 60-89 ml/min  Lab Results   Component Value Date    EGFR 42 01/10/2025    EGFR 34 01/09/2025    EGFR 55 11/22/2024    CREATININE 1.23 01/10/2025    CREATININE 1.46 (H) 01/09/2025    CREATININE 0.99 11/22/2024   Baseline creatinine appears to be 1.0-1.2 currently 1.46  Not quite meeting ABRAM criteria suspect elevation secondary to dehydration, nausea, vomiting, diarrhea  Will continue with IV fluids hold PTA losartan hydrochlorothiazide  Urinary retention protocol  Back to baseline  Essential hypertension  Hold PTA losartan/hydrochlorothiazide in the setting of elevated creatinine and hypotension  Continue PTA carvedilol 6.25 mg twice daily twice daily  Will restart Cozaar.  Patient does not want to take hydrochlorothiazide  Stable at discharge  Coronary artery disease involving native coronary artery of native heart  History of CAD with stents in 2021  Maintained on ASA, Plavix, and carvedilol.  Continue  Ambulatory dysfunction  See plan as above  PT consulted  Patient wants to go home to and does not  want home care services   Hospital Course:     Mirna Boswell is a 74 y.o. female patient who originally presented to the hospital on   Admission Orders (From admission, onward)       Ordered        01/09/25 0321  INPATIENT ADMISSION  Once                         due to fall and was admitted with presyncope.  Patient complained of nausea vomiting and diarrhea was seen by PCP as outpatient and was diagnosed with viral gastroenteritis.  Patient was admitted and started on IV fluids.  Patient did not have any further diarrhea and stool samples could not be collected.  Patient orthostatic blood pressures are normal.  Patient echocardiogram is unremarkable.  Patient is feeling back to baseline and was restarted on Coreg and Cozaar.  Patient states she has an appointment  with cardiologist today and wants to get discharged.  Patient will be discharged with follow-up with PCP and cardiology.  Repeat CBC in 3 days to monitor for leukopenia  Discussed with patient and  who are in agreement with the discharge plan.  Patient refused any home care services  On Exam-  Chest-bilateral air entry, clear to auscultation  Abdomen-soft, nontender  Heart-S1 S2 regular  Extremities-no pedal edema or calf tenderness  Neuro-alert awake oriented x3.  No focal deficits    Please see above list of diagnoses and related plan for additional information.   Follow-up with PCP and repeat CBC in 3 days  Follow-up with cardiology today.  Patient already has an appointment    CONSULTING PROVIDERS   None    PROCEDURES PERFORMED  * No surgery found *    RADIOLOGY RESULTS  Echo complete w/ contrast if indicated  Result Date: 1/9/2025  Narrative:   Left Ventricle: Left ventricular cavity size is normal. Wall thickness is normal. The left ventricular ejection fraction is 65%. Systolic function is normal. Wall motion is normal. Diastolic function is normal.   Right Ventricle: Right ventricular cavity size is normal. Systolic function is normal.   Left Atrium: The atrium is mildly dilated (35-41 mL/m2).   Mitral Valve: There is mild annular calcification. There is mild regurgitation.   Tricuspid Valve: There is mild regurgitation.     TRAUMA - CT head wo contrast  Result Date: 1/9/2025  Narrative: CT BRAIN - WITHOUT CONTRAST INDICATION:   TRAUMA. COMPARISON: 12/2/2024. TECHNIQUE:  CT examination of the brain was performed.  Multiplanar 2D reformatted images were created from the source data. Radiation dose length product (DLP) for this visit:  753.4 mGy-cm .  This examination, like all CT scans performed in the [facility], was performed utilizing techniques to minimize radiation dose exposure, including the use of iterative reconstruction and automated exposure control. IMAGE QUALITY:   Diagnostic. FINDINGS: PARENCHYMA: Decreased attenuation is noted in periventricular and subcortical white matter demonstrating an appearance that is statistically most likely to represent chronic microangiopathic change; this appearance is similar when compared to most recent  prior examination. No CT signs of acute infarction.  No intracranial mass, mass effect or midline shift.  No acute parenchymal hemorrhage. VENTRICLES AND EXTRA-AXIAL SPACES:  Normal for the patient's age. VISUALIZED ORBITS: Normal visualized orbits. PARANASAL SINUSES: Mild scattered mucosal thickening. CALVARIUM AND EXTRACRANIAL SOFT TISSUES:  Normal.     Impression: No acute intracranial abnormality. The study was marked in EPIC for immediate notification. Workstation performed: [id number]     CT chest without contrast  Result Date: 1/9/2025  Narrative: CT CHEST WITHOUT IV CONTRAST INDICATION: TRAUMA. COMPARISON: 3/26/2021. TECHNIQUE: CT examination of the chest was performed without intravenous contrast. Multiplanar 2D reformatted images were created from the source data. This examination, like all CT scans performed in the [facility], was performed utilizing techniques to minimize radiation dose exposure, including the use of iterative reconstruction and automated exposure control. Radiation dose length product (DLP) for this visit: 210.5 mGy-cm FINDINGS: LUNGS: No consolidation. Linear scarring in the lingula and right middle lobe. Stable small bilateral perifissural nodules or intrapulmonary lymph nodes. There is no tracheal or endobronchial lesion. PLEURA: Unremarkable. HEART/GREAT VESSELS: Heart is unremarkable for patient's age. Coronary artery stents. No thoracic aortic aneurysm. MEDIASTINUM AND DELMY: Unremarkable. CHEST WALL AND LOWER NECK: Redemonstration of enlarged left thyroid lobe containing multiple nodules, previously characterized with ultrasound. Loop recorder embedded in the left anterior chest wall.  VISUALIZED STRUCTURES IN THE UPPER ABDOMEN: Unremarkable. OSSEOUS STRUCTURES: Subtle deformities of left anterior 5th-7th ribs, new since the prior study. Chronic mild compression fracture of T3.     Impression: Subtle deformities of left anterior 5th-7th suspicious for acute nondisplaced fractures. The study was marked in EPIC for immediate notification. Workstation performed: [id number]     TRAUMA - CT spine cervical wo contrast  Result Date: 1/9/2025  Narrative: CT CERVICAL SPINE - WITHOUT CONTRAST INDICATION:   TRAUMA. COMPARISON: MRI cervical spine 8/15/2023. TECHNIQUE:  CT examination of the cervical spine was performed without intravenous contrast.  Contiguous axial images were obtained. Multiplanar 2D reformatted images were created from the source data. Radiation dose length product (DLP) for this visit:  170 mGy-cm .  This examination, like all CT scans performed in the [facility], was performed utilizing techniques to minimize radiation dose exposure, including the use of iterative reconstruction and automated exposure control. IMAGE QUALITY:  Diagnostic. FINDINGS: ALIGNMENT: Grade 1 anterolisthesis of C4 on C5. VERTEBRAE:  No fracture in the cervical spine. Chronic compression deformity of T3 unchanged. DEGENERATIVE CHANGES: Mild to moderate multilevel cervical degenerative changes are noted. No critical central canal stenosis. PREVERTEBRAL AND PARASPINAL SOFT TISSUES: Redemonstration of enlarged left thyroid lobe containing multiple nodules, previously characterized with ultrasound. THORACIC INLET: Please refer to the concurrent chest CT report for thoracic inlet findings.     Impression: No cervical spine fracture or traumatic malalignment. The study was marked in EPIC for immediate notification. Workstation performed: [id number]     VAS CAROTID DUPLEX BILATERAL COMPLETE  Result Date: 12/13/2024  Narrative: High resolution Carotid duplex examination Indication: Cervical bruit Technical  quality: Satisfactory Right:   Doppler spectral analysis: peak systolic velocity for the common carotid artery is 75 cm/sec, for the internal carotid is 94 cm/sec, and for the external carotid is 91 cm/sec. The end-diastolic velocity for the internal carotid artery is 25 cm/sec. On B-mode imaging, there is mild plaque. Left: Doppler spectral analysis: peak systolic velocity for the common carotid artery  is 102 cm/sec, for the internal carotid is 126 cm/sec, and for the external carotid is 116 cm/sec. The end-diastolic velocity for the internal carotid artery is 39 cm/sec. On B-mode imaging, there is moderate plaque. Vertebral flow was antegrade bilaterally.    Impression: Impression: 1. Less than 50% right internal carotid artery stenosis 2. 50-69% left internal carotid artery stenosis 3. Normally directed vertebral flow bilaterally. Please note: Measurement of carotid stenosis is based upon velocity criteria that correlate the residual internal carotid artery diameter with NASCET-based stenosis levels. Stenosis or occlusion may factitiously elevate blood flow velocities on the opposite side, possibly upgrading a lesser degree of stenosis. Calcifications with acoustic shadowing may mask a significant stenosis, and another modality such as MRA or CTA may be necessary for better evaluation. Workstation:[id number]      LABS  Results from last 7 days   Lab Units 01/10/25  0750 01/10/25  0413 01/09/25  0153   WBC Thousand/uL 3.19* 2.85* 4.93   HEMOGLOBIN g/dL 11.9 10.8* 12.5   HEMATOCRIT % 36.7 33.5* 38.9   MCV fL 86 87 86   PLATELETS Thousands/uL 202 200 209     Results from last 7 days   Lab Units 01/10/25  0413 01/09/25  0153   SODIUM mmol/L 139 136   POTASSIUM mmol/L 3.8 4.0   CHLORIDE mmol/L 108 104   CO2 mmol/L 24 20*   BUN mg/dL 25 26*   CREATININE mg/dL 1.23 1.46*   CALCIUM mg/dL 7.9* 8.9   ALBUMIN g/dL 3.2* 3.9   TOTAL BILIRUBIN mg/dL 0.32 0.73   ALK PHOS U/L 62 75   ALT U/L 17 21   AST U/L 27 28   EGFR  "ml/min/1.73sq m 42 34   GLUCOSE RANDOM mg/dL 99 140     Results from last 7 days   Lab Units 01/09/25  0351 01/09/25  0153   HS TNI 0HR ng/L  --  7   HS TNI 2HR ng/L 6  --               Results from last 7 days   Lab Units 01/09/25  0149   POC GLUCOSE mg/dl 144*         Results from last 7 days   Lab Units 01/09/25  0153   TSH 3RD GENERATON uIU/mL 1.185               Cultures:         Invalid input(s): \"URIBILINOGEN\"        Results from last 7 days   Lab Units 01/09/25  0455   INFLUENZA A PCR  Negative       Condition at Discharge:  good      Discharge instructions/Information to patient and family:   See after visit summary for information provided to patient and family.      Provisions for Follow-Up Care:  See after visit summary for information related to follow-up care and any pertinent home health orders.      Disposition:     Home       Discharge Statement:  I spent 40 minutes discharging the patient. This time was spent on the day of discharge. I had direct contact with the patient on the day of discharge. Greater than 50% of the total time was spent examining patient, answering all patient questions, arranging and discussing plan of care with patient as well as directly providing post-discharge instructions.  Additional time then spent on discharge activities.    Discharge Medications:  See after visit summary for reconciled discharge medications provided to patient and family.      ** Please Note: This note has been constructed using a voice recognition system **  "

## 2025-01-10 NOTE — ASSESSMENT & PLAN NOTE
Patient reports 2 episodes of presyncope with subsequent fall.  She reports symptoms of shortness of breath, chest tightness, and feelings of blacking out prior to falling.  Denies LOC.  + Head strike on ASA and Plavix.  CT head without acute intracranial abnormalities  Patient noted to be hypotensive upon admission SBP in the 90s  EKG showing normal sinus rhythm.  Troponins negative  Suspect syncope in the setting of hypovolemia and decreased oral intake will need to rule out cardiac origin vs orthostasis vs medication induced.  Patient with decreased oral intake, nausea, vomiting, and diarrhea for 2 days.  Reports starting new antihypertensive therapy 2 weeks ago since then has been having lower than normal BPs although symptoms started 2 days ago  Plan  Echocardiogram showed ejection fraction of 65% with normal diastolic function  Orthostatic vitals are normal  Monitor on telemetry  Fall precautions  Patient is feeling back to baseline and wants to go as she has an appointment with cardiology today.  Patient is recommended to repeat CBC in 3 days and follow-up with PCP

## 2025-01-10 NOTE — PLAN OF CARE

## 2025-01-10 NOTE — ASSESSMENT & PLAN NOTE
See plan as above  PT consulted  Patient wants to go home to and does not  want home care services

## 2025-01-10 NOTE — DISCHARGE INSTR - AVS FIRST PAGE
Follow-up with PCP and repeat CBC in 3 days  Follow-up with cardiology today.  Patient already has an appointment

## 2025-01-13 RX ORDER — LOSARTAN POTASSIUM 50 MG/1
50 TABLET ORAL DAILY
COMMUNITY
Start: 2025-01-10 | End: 2025-04-10

## 2025-01-14 ENCOUNTER — OFFICE VISIT (OUTPATIENT)
Age: 75
End: 2025-01-14
Payer: MEDICARE

## 2025-01-14 VITALS
HEART RATE: 63 BPM | DIASTOLIC BLOOD PRESSURE: 82 MMHG | SYSTOLIC BLOOD PRESSURE: 148 MMHG | WEIGHT: 156 LBS | OXYGEN SATURATION: 99 % | BODY MASS INDEX: 29.45 KG/M2 | TEMPERATURE: 98.2 F | HEIGHT: 61 IN

## 2025-01-14 DIAGNOSIS — I25.10 CORONARY ARTERY DISEASE INVOLVING NATIVE CORONARY ARTERY OF NATIVE HEART WITHOUT ANGINA PECTORIS: ICD-10-CM

## 2025-01-14 DIAGNOSIS — N18.30 STAGE 3 CHRONIC KIDNEY DISEASE, UNSPECIFIED WHETHER STAGE 3A OR 3B CKD (HCC): ICD-10-CM

## 2025-01-14 DIAGNOSIS — A08.4 VIRAL GASTROENTERITIS: Primary | ICD-10-CM

## 2025-01-14 PROBLEM — L03.115 CELLULITIS OF RIGHT LEG: Status: RESOLVED | Noted: 2024-11-21 | Resolved: 2025-01-14

## 2025-01-14 PROCEDURE — 99496 TRANSJ CARE MGMT HIGH F2F 7D: CPT | Performed by: INTERNAL MEDICINE

## 2025-01-14 RX ORDER — CARVEDILOL 12.5 MG/1
12.5 TABLET ORAL 2 TIMES DAILY WITH MEALS
Qty: 200 TABLET | Refills: 1 | Status: SHIPPED | OUTPATIENT
Start: 2025-01-14

## 2025-01-14 NOTE — ASSESSMENT & PLAN NOTE
Lab Results   Component Value Date    EGFR 42 01/10/2025    EGFR 34 01/09/2025    EGFR 55 11/22/2024    CREATININE 1.23 01/10/2025    CREATININE 1.46 (H) 01/09/2025    CREATININE 0.99 11/22/2024

## 2025-01-14 NOTE — ASSESSMENT & PLAN NOTE
Orders:    carvedilol (COREG) 12.5 mg tablet; Take 1 tablet (12.5 mg total) by mouth 2 (two) times a day with meals

## 2025-01-14 NOTE — PROGRESS NOTES
Name: Mirna Boswell      : 1950      MRN: [id number]  Encounter Provider: Usama Jiang MD  Encounter Date: 2025   Encounter department: [facility] [place]    TCM Call       Date and time call was made  1/10/2025  1:40 PM    Hospital care reviewed  Records reviewed    Patient was hospitialized at  [facility]    Date of Admission  25    Date of discharge  01/10/25    Diagnosis  postural dizziness with presyncope    Disposition  Home    Were the patients medications reviewed and updated  Yes    Current Symptoms  --  sore where ribs fx'd    Shortness of breath severity  Mild          TCM Call       Post hospital issues  Reduced activity    Should patient be enrolled in anticoag monitoring?  Yes    Scheduled for follow up?  Yes    Did you obtain your prescribed medications  Yes    Do you need help managing your prescriptions or medications  No    Is transportation to your appointment needed  No    I have advised the patient to call PCP with any new or worsening symptoms  Xavier Holland CMA          :  Assessment & Plan  Coronary artery disease involving native coronary artery of native heart without angina pectoris    Orders:    carvedilol (COREG) 12.5 mg tablet; Take 1 tablet (12.5 mg total) by mouth 2 (two) times a day with meals    Viral gastroenteritis         Stage 3 chronic kidney disease, unspecified whether stage 3a or 3b CKD (HCC)  Lab Results   Component Value Date    EGFR 42 01/10/2025    EGFR 34 2025    EGFR 55 2024    CREATININE 1.23 01/10/2025    CREATININE 1.46 (H) 2025    CREATININE 0.99 2024                   History of Present Illness     The patient is seen for Transition of care Management following recent hospitalization after experiencing a syncopal episode while she was suffering from a gastroenteritis with episodes of vomiting and diarrhea.  She was brought to the hospital found to be in ABRAM.  She had  "imaging which was consistent with nondisplaced fractures of the right seventh eighth and ninth ribs.  She did not have any additional episodes of vomiting and diarrhea while inpatient.  She was given intravenous fluids with normalization of her blood pressure and kidney function and was restarted on her antihypertensive medications.  She presents to the office for follow-up.  She is feeling well and she denies any additional episodes of lightheadedness or dizziness.  Her blood pressure is actually little bit on the high side with a systolic BP of 148      Review of Systems   Constitutional: Negative.    HENT: Negative.     Eyes: Negative.    Respiratory: Negative.     Cardiovascular:  Positive for chest pain (Mild left-sided rib pain).   Gastrointestinal: Negative.    Endocrine: Negative.    Genitourinary: Negative.    Musculoskeletal: Negative.    Neurological: Negative.    Hematological: Negative.    Psychiatric/Behavioral: Negative.         Objective   /82 (BP Location: Left arm, Patient Position: Sitting, Cuff Size: Standard)   Pulse 63   Temp 98.2 °F (36.8 °C) (Temporal)   Ht 5' 1\" (1.549 m)   Wt 70.8 kg (156 lb)   SpO2 99%   BMI 29.48 kg/m²      Physical Exam  Vitals reviewed.   Constitutional:       General: She is not in acute distress.     Appearance: Normal appearance. She is not ill-appearing, toxic-appearing or diaphoretic.   HENT:      Head: Normocephalic and atraumatic.      Right Ear: External ear normal.      Left Ear: External ear normal.      Nose: Nose normal.      Mouth/Throat:      Mouth: Mucous membranes are moist.   Eyes:      General: No scleral icterus.     Conjunctiva/sclera: Conjunctivae normal.      Pupils: Pupils are equal, round, and reactive to light.   Neck:      Vascular: No JVD.      Trachea: No tracheal deviation.   Cardiovascular:      Rate and Rhythm: Normal rate and regular rhythm.      Heart sounds: Normal heart sounds. No murmur heard.  Pulmonary:      Effort: " Pulmonary effort is normal. No respiratory distress.      Breath sounds: Normal breath sounds.   Abdominal:      General: Abdomen is flat. There is no distension.   Musculoskeletal:      Cervical back: Neck supple.      Right lower leg: No edema.      Left lower leg: No edema.   Skin:     General: Skin is warm.      Coloration: Skin is not jaundiced.      Findings: No bruising, erythema or rash.   Neurological:      General: No focal deficit present.      Mental Status: She is alert and oriented to person, place, and time. Mental status is at baseline.   Psychiatric:         Mood and Affect: Mood normal.         Behavior: Behavior normal.

## 2025-01-15 ENCOUNTER — APPOINTMENT (OUTPATIENT)
Dept: LAB | Facility: CLINIC | Age: 75
End: 2025-01-15
Payer: MEDICARE

## 2025-01-15 LAB
ALBUMIN SERPL BCG-MCNC: 3.6 G/DL (ref 3.5–5)
ALP SERPL-CCNC: 72 U/L (ref 34–104)
ALT SERPL W P-5'-P-CCNC: 32 U/L (ref 7–52)
ANION GAP SERPL CALCULATED.3IONS-SCNC: 5 MMOL/L (ref 4–13)
AST SERPL W P-5'-P-CCNC: 26 U/L (ref 13–39)
BASOPHILS # BLD AUTO: 0.03 THOUSANDS/ΜL (ref 0–0.1)
BASOPHILS NFR BLD AUTO: 1 % (ref 0–1)
BILIRUB SERPL-MCNC: 0.62 MG/DL (ref 0.2–1)
BUN SERPL-MCNC: 14 MG/DL (ref 5–25)
CALCIUM SERPL-MCNC: 9 MG/DL (ref 8.4–10.2)
CHLORIDE SERPL-SCNC: 110 MMOL/L (ref 96–108)
CO2 SERPL-SCNC: 24 MMOL/L (ref 21–32)
CREAT SERPL-MCNC: 0.97 MG/DL (ref 0.6–1.3)
EOSINOPHIL # BLD AUTO: 0.1 THOUSAND/ΜL (ref 0–0.61)
EOSINOPHIL NFR BLD AUTO: 2 % (ref 0–6)
ERYTHROCYTE [DISTWIDTH] IN BLOOD BY AUTOMATED COUNT: 13.2 % (ref 11.6–15.1)
GFR SERPL CREATININE-BSD FRML MDRD: 56 ML/MIN/1.73SQ M
GLUCOSE P FAST SERPL-MCNC: 124 MG/DL (ref 65–99)
HCT VFR BLD AUTO: 35.3 % (ref 34.8–46.1)
HGB BLD-MCNC: 11.6 G/DL (ref 11.5–15.4)
IMM GRANULOCYTES # BLD AUTO: 0.02 THOUSAND/UL (ref 0–0.2)
IMM GRANULOCYTES NFR BLD AUTO: 0 % (ref 0–2)
LYMPHOCYTES # BLD AUTO: 1.28 THOUSANDS/ΜL (ref 0.6–4.47)
LYMPHOCYTES NFR BLD AUTO: 23 % (ref 14–44)
MCH RBC QN AUTO: 28.2 PG (ref 26.8–34.3)
MCHC RBC AUTO-ENTMCNC: 32.9 G/DL (ref 31.4–37.4)
MCV RBC AUTO: 86 FL (ref 82–98)
MONOCYTES # BLD AUTO: 0.45 THOUSAND/ΜL (ref 0.17–1.22)
MONOCYTES NFR BLD AUTO: 8 % (ref 4–12)
NEUTROPHILS # BLD AUTO: 3.82 THOUSANDS/ΜL (ref 1.85–7.62)
NEUTS SEG NFR BLD AUTO: 66 % (ref 43–75)
NRBC BLD AUTO-RTO: 0 /100 WBCS
PLATELET # BLD AUTO: 254 THOUSANDS/UL (ref 149–390)
PMV BLD AUTO: 9.3 FL (ref 8.9–12.7)
POTASSIUM SERPL-SCNC: 4.8 MMOL/L (ref 3.5–5.3)
PROT SERPL-MCNC: 6.3 G/DL (ref 6.4–8.4)
RBC # BLD AUTO: 4.12 MILLION/UL (ref 3.81–5.12)
SODIUM SERPL-SCNC: 139 MMOL/L (ref 135–147)
WBC # BLD AUTO: 5.7 THOUSAND/UL (ref 4.31–10.16)

## 2025-01-23 LAB
ATRIAL RATE: 71 BPM
P AXIS: 71 DEGREES
PR INTERVAL: 170 MS
QRS AXIS: 73 DEGREES
QRSD INTERVAL: 84 MS
QT INTERVAL: 402 MS
QTC INTERVAL: 436 MS
T WAVE AXIS: 58 DEGREES
VENTRICULAR RATE: 71 BPM

## 2025-02-07 PROBLEM — A08.4 VIRAL GASTROENTERITIS: Status: RESOLVED | Noted: 2025-01-08 | Resolved: 2025-02-07

## 2025-02-08 PROBLEM — R19.7 NAUSEA VOMITING AND DIARRHEA: Status: RESOLVED | Noted: 2025-01-09 | Resolved: 2025-02-08

## 2025-02-08 PROBLEM — R11.2 NAUSEA VOMITING AND DIARRHEA: Status: RESOLVED | Noted: 2025-01-09 | Resolved: 2025-02-08

## 2025-02-14 ENCOUNTER — RA CDI HCC (OUTPATIENT)
Dept: OTHER | Facility: HOSPITAL | Age: 75
End: 2025-02-14

## 2025-02-17 ENCOUNTER — TELEPHONE (OUTPATIENT)
Age: 75
End: 2025-02-17

## 2025-02-18 ENCOUNTER — APPOINTMENT (OUTPATIENT)
Dept: LAB | Facility: CLINIC | Age: 75
End: 2025-02-18
Payer: MEDICARE

## 2025-02-18 DIAGNOSIS — E78.00 PURE HYPERCHOLESTEROLEMIA: ICD-10-CM

## 2025-02-18 DIAGNOSIS — L03.115 CELLULITIS OF RIGHT LEG: ICD-10-CM

## 2025-02-18 DIAGNOSIS — I10 ESSENTIAL HYPERTENSION: ICD-10-CM

## 2025-02-18 DIAGNOSIS — I25.10 CORONARY ARTERY DISEASE INVOLVING NATIVE CORONARY ARTERY OF NATIVE HEART WITHOUT ANGINA PECTORIS: ICD-10-CM

## 2025-02-18 LAB
ALBUMIN SERPL BCG-MCNC: 3.9 G/DL (ref 3.5–5)
ALP SERPL-CCNC: 94 U/L (ref 34–104)
ALT SERPL W P-5'-P-CCNC: 13 U/L (ref 7–52)
ANION GAP SERPL CALCULATED.3IONS-SCNC: 4 MMOL/L (ref 4–13)
AST SERPL W P-5'-P-CCNC: 19 U/L (ref 13–39)
BASOPHILS # BLD AUTO: 0.05 THOUSANDS/ΜL (ref 0–0.1)
BASOPHILS NFR BLD AUTO: 1 % (ref 0–1)
BILIRUB SERPL-MCNC: 0.54 MG/DL (ref 0.2–1)
BUN SERPL-MCNC: 19 MG/DL (ref 5–25)
CALCIUM SERPL-MCNC: 9.2 MG/DL (ref 8.4–10.2)
CHLORIDE SERPL-SCNC: 108 MMOL/L (ref 96–108)
CHOLEST SERPL-MCNC: 130 MG/DL (ref ?–200)
CO2 SERPL-SCNC: 27 MMOL/L (ref 21–32)
CREAT SERPL-MCNC: 0.95 MG/DL (ref 0.6–1.3)
EOSINOPHIL # BLD AUTO: 0.18 THOUSAND/ΜL (ref 0–0.61)
EOSINOPHIL NFR BLD AUTO: 3 % (ref 0–6)
ERYTHROCYTE [DISTWIDTH] IN BLOOD BY AUTOMATED COUNT: 13.9 % (ref 11.6–15.1)
GFR SERPL CREATININE-BSD FRML MDRD: 58 ML/MIN/1.73SQ M
GLUCOSE P FAST SERPL-MCNC: 102 MG/DL (ref 65–99)
HCT VFR BLD AUTO: 38.7 % (ref 34.8–46.1)
HDLC SERPL-MCNC: 48 MG/DL
HGB BLD-MCNC: 12.3 G/DL (ref 11.5–15.4)
IMM GRANULOCYTES # BLD AUTO: 0.01 THOUSAND/UL (ref 0–0.2)
IMM GRANULOCYTES NFR BLD AUTO: 0 % (ref 0–2)
LDLC SERPL CALC-MCNC: 62 MG/DL (ref 0–100)
LYMPHOCYTES # BLD AUTO: 1.01 THOUSANDS/ΜL (ref 0.6–4.47)
LYMPHOCYTES NFR BLD AUTO: 19 % (ref 14–44)
MCH RBC QN AUTO: 28 PG (ref 26.8–34.3)
MCHC RBC AUTO-ENTMCNC: 31.8 G/DL (ref 31.4–37.4)
MCV RBC AUTO: 88 FL (ref 82–98)
MONOCYTES # BLD AUTO: 0.75 THOUSAND/ΜL (ref 0.17–1.22)
MONOCYTES NFR BLD AUTO: 14 % (ref 4–12)
NEUTROPHILS # BLD AUTO: 3.27 THOUSANDS/ΜL (ref 1.85–7.62)
NEUTS SEG NFR BLD AUTO: 63 % (ref 43–75)
NONHDLC SERPL-MCNC: 82 MG/DL
NRBC BLD AUTO-RTO: 0 /100 WBCS
PLATELET # BLD AUTO: 238 THOUSANDS/UL (ref 149–390)
PMV BLD AUTO: 9.6 FL (ref 8.9–12.7)
POTASSIUM SERPL-SCNC: 4.7 MMOL/L (ref 3.5–5.3)
PROT SERPL-MCNC: 6.7 G/DL (ref 6.4–8.4)
RBC # BLD AUTO: 4.39 MILLION/UL (ref 3.81–5.12)
SODIUM SERPL-SCNC: 139 MMOL/L (ref 135–147)
TRIGL SERPL-MCNC: 101 MG/DL (ref ?–150)
WBC # BLD AUTO: 5.27 THOUSAND/UL (ref 4.31–10.16)

## 2025-02-18 PROCEDURE — 85025 COMPLETE CBC W/AUTO DIFF WBC: CPT

## 2025-02-18 PROCEDURE — 80061 LIPID PANEL: CPT

## 2025-02-18 PROCEDURE — 80053 COMPREHEN METABOLIC PANEL: CPT

## 2025-02-18 PROCEDURE — 36415 COLL VENOUS BLD VENIPUNCTURE: CPT

## 2025-02-19 DIAGNOSIS — J02.9 PHARYNGITIS, UNSPECIFIED ETIOLOGY: Primary | ICD-10-CM

## 2025-02-19 RX ORDER — LIDOCAINE HYDROCHLORIDE 20 MG/ML
5 SOLUTION OROPHARYNGEAL 4 TIMES DAILY PRN
Qty: 100 ML | Refills: 2 | Status: SHIPPED | OUTPATIENT
Start: 2025-02-19

## 2025-02-19 NOTE — TELEPHONE ENCOUNTER
Pt called again regarding her very sore throat. She wanted to know if Dr ARZOLA could prescribe her something. I did inform her that he may not call anything in without being seen first. She has an appt on Friday.    Please advise.    [facility] LEONID [place] - [address] [phone]

## 2025-02-19 NOTE — TELEPHONE ENCOUNTER
Notified patient Dr Jiang sent to prescriptions to [facility] [address]

## 2025-02-21 ENCOUNTER — OFFICE VISIT (OUTPATIENT)
Age: 75
End: 2025-02-21
Payer: MEDICARE

## 2025-02-21 VITALS
HEIGHT: 61 IN | BODY MASS INDEX: 29.45 KG/M2 | HEART RATE: 67 BPM | DIASTOLIC BLOOD PRESSURE: 62 MMHG | SYSTOLIC BLOOD PRESSURE: 122 MMHG | WEIGHT: 156 LBS | TEMPERATURE: 97.7 F | OXYGEN SATURATION: 99 %

## 2025-02-21 DIAGNOSIS — Z12.31 ENCOUNTER FOR SCREENING MAMMOGRAM FOR BREAST CANCER: ICD-10-CM

## 2025-02-21 DIAGNOSIS — J06.9 VIRAL URI: Primary | ICD-10-CM

## 2025-02-21 PROCEDURE — 99213 OFFICE O/P EST LOW 20 MIN: CPT | Performed by: INTERNAL MEDICINE

## 2025-02-21 PROCEDURE — G2211 COMPLEX E/M VISIT ADD ON: HCPCS | Performed by: INTERNAL MEDICINE

## 2025-02-21 RX ORDER — HYDROXYZINE HYDROCHLORIDE 10 MG/5ML
4 SYRUP ORAL EVERY 6 HOURS PRN
Qty: 30 TABLET | Refills: 0 | Status: SHIPPED | OUTPATIENT
Start: 2025-02-21

## 2025-02-21 NOTE — PROGRESS NOTES
"Name: Mirna Boswell      : 1950      MRN: [id number]  Encounter Provider: Usama Jiang MD  Encounter Date: 2025   [facility]  :  Assessment & Plan  Viral URI  Symptomatic treatment with chlorpheniramine and continued use of Vicks products  Orders:    chlorpheniramine (CHLOR-TRIMETON) 4 MG tablet; Take 1 tablet (4 mg total) by mouth every 6 (six) hours as needed for rhinitis    Encounter for screening mammogram for breast cancer  Mammogram ordered.  Orders:    Mammo screening bilateral w 3d and cad; Future           History of Present Illness   Patient presents to the office for 3-month follow-up visit.  Recently she has been complaining of a sore throat and some tickling in her chest and a mild cough but no fevers or chills.  She denies shortness of breath she had some recent labs which are reviewed.  Lipids are essentially at goal.  Total cholesterol is 130.  Triglycerides 101 HDL cholesterol is 48, LDL cholesterol 62.  CMP is unremarkable other than a fasting glucose of 102.  CBC likewise is normal with the exception of 14% monocytes.      Review of Systems   Constitutional: Negative.    HENT:  Positive for congestion and sore throat.    Eyes: Negative.    Respiratory: Negative.     Cardiovascular: Negative.    Gastrointestinal: Negative.    Endocrine: Negative.    Genitourinary: Negative.    Musculoskeletal: Negative.    Skin: Negative.    Allergic/Immunologic: Negative.    Neurological: Negative.    Hematological: Negative.    Psychiatric/Behavioral: Negative.         Objective   /62 (BP Location: Left arm, Patient Position: Sitting, Cuff Size: Standard)   Pulse 67   Temp 97.7 °F (36.5 °C) (Temporal)   Ht 5' 1.02\" (1.55 m)   Wt 70.8 kg (156 lb)   SpO2 99%   BMI 29.45 kg/m²      Physical Exam  Vitals reviewed.   Constitutional:       General: She is not in acute distress.     Appearance: Normal appearance. She is normal " weight. She is not ill-appearing, toxic-appearing or diaphoretic.   HENT:      Head: Normocephalic and atraumatic.      Right Ear: External ear normal.      Left Ear: External ear normal.      Nose: No rhinorrhea.      Mouth/Throat:      Mouth: Mucous membranes are moist.      Pharynx: Oropharynx is clear. No oropharyngeal exudate or posterior oropharyngeal erythema.   Eyes:      General: No scleral icterus.     Conjunctiva/sclera: Conjunctivae normal.      Pupils: Pupils are equal, round, and reactive to light.   Neck:      Vascular: No carotid bruit.   Cardiovascular:      Rate and Rhythm: Normal rate and regular rhythm.      Heart sounds: Normal heart sounds. No murmur heard.  Pulmonary:      Effort: Pulmonary effort is normal. No respiratory distress.      Breath sounds: Normal breath sounds.   Abdominal:      General: Abdomen is flat. There is no distension.      Tenderness: There is no abdominal tenderness.   Musculoskeletal:         General: No swelling.      Cervical back: Normal range of motion. No rigidity or tenderness.      Right lower leg: No edema.      Left lower leg: No edema.   Skin:     General: Skin is warm.      Coloration: Skin is not jaundiced.      Findings: No bruising, erythema or rash.   Neurological:      General: No focal deficit present.      Mental Status: She is alert and oriented to person, place, and time. Mental status is at baseline.   Psychiatric:         Mood and Affect: Mood normal.         Behavior: Behavior normal.         Thought Content: Thought content normal.         Judgment: Judgment normal.

## 2025-02-21 NOTE — ASSESSMENT & PLAN NOTE
Symptomatic treatment with chlorpheniramine and continued use of Vicks products  Orders:    chlorpheniramine (CHLOR-TRIMETON) 4 MG tablet; Take 1 tablet (4 mg total) by mouth every 6 (six) hours as needed for rhinitis

## 2025-03-04 DIAGNOSIS — E78.5 HYPERLIPIDEMIA, UNSPECIFIED HYPERLIPIDEMIA TYPE: Primary | ICD-10-CM

## 2025-03-04 NOTE — TELEPHONE ENCOUNTER
Reason for call:   [x] Refill   [] Prior Auth  [x] Other: Historical provider    Office:   [x] PCP/Provider -   [] Specialty/Provider -     Medication:  atorvastatin (LIPITOR) 20 mg tablet    Dose/Frequency:  Take 20 mg by mouth daily,     Quantity: 30    Pharmacy: [address]      Does the patient have enough for 3 days?   [x] Yes   [] No - Send as HP to POD

## 2025-03-05 RX ORDER — ATORVASTATIN CALCIUM 20 MG/1
20 TABLET, FILM COATED ORAL DAILY
Qty: 90 TABLET | Refills: 1 | Status: SHIPPED | OUTPATIENT
Start: 2025-03-05

## 2025-03-09 ENCOUNTER — HOSPITAL ENCOUNTER (EMERGENCY)
Facility: HOSPITAL | Age: 75
Discharge: HOME | End: 2025-03-09
Attending: EMERGENCY MEDICINE
Payer: MEDICARE

## 2025-03-09 VITALS
OXYGEN SATURATION: 96 % | SYSTOLIC BLOOD PRESSURE: 155 MMHG | RESPIRATION RATE: 16 BRPM | TEMPERATURE: 97.6 F | DIASTOLIC BLOOD PRESSURE: 66 MMHG | HEART RATE: 67 BPM
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
## 2025-03-09 PROCEDURE — 99283 EMERGENCY DEPT VISIT LOW MDM: CPT

## 2025-03-09 PROCEDURE — 99283 EMERGENCY DEPT VISIT LOW MDM: CPT | Performed by: PHYSICIAN ASSISTANT
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
## 2025-03-14 ENCOUNTER — TELEPHONE (OUTPATIENT)
Dept: OBGYN CLINIC | Facility: CLINIC | Age: 75
End: 2025-03-14

## 2025-03-14 NOTE — TELEPHONE ENCOUNTER
EARLENEM providing a call back number to reschedule an appointment with dr Momo Macias. Previous appointment was cancelled because Dr. Macias had an emergency and will not be in the office in the afternoon.

## 2025-03-17 ENCOUNTER — APPOINTMENT (OUTPATIENT)
Dept: RADIOLOGY | Facility: CLINIC | Age: 75
End: 2025-03-17
Payer: MEDICARE

## 2025-03-17 ENCOUNTER — OFFICE VISIT (OUTPATIENT)
Dept: OBGYN CLINIC | Facility: CLINIC | Age: 75
End: 2025-03-17
Payer: MEDICARE

## 2025-03-17 VITALS — BODY MASS INDEX: 30.02 KG/M2 | HEIGHT: 61 IN | WEIGHT: 159 LBS

## 2025-03-17 DIAGNOSIS — M25.562 PAIN IN BOTH KNEES, UNSPECIFIED CHRONICITY: ICD-10-CM

## 2025-03-17 DIAGNOSIS — M25.561 PAIN IN BOTH KNEES, UNSPECIFIED CHRONICITY: ICD-10-CM

## 2025-03-17 DIAGNOSIS — M17.0 PRIMARY OSTEOARTHRITIS OF BOTH KNEES: Primary | ICD-10-CM

## 2025-03-17 PROCEDURE — 20610 DRAIN/INJ JOINT/BURSA W/O US: CPT | Performed by: ORTHOPAEDIC SURGERY

## 2025-03-17 PROCEDURE — 99213 OFFICE O/P EST LOW 20 MIN: CPT | Performed by: ORTHOPAEDIC SURGERY

## 2025-03-17 PROCEDURE — 73564 X-RAY EXAM KNEE 4 OR MORE: CPT

## 2025-03-17 RX ORDER — OMEPRAZOLE 20 MG/1
20 CAPSULE, DELAYED RELEASE ORAL DAILY
COMMUNITY

## 2025-03-17 RX ADMIN — ROPIVACAINE HYDROCHLORIDE 4 ML: 2 INJECTION, SOLUTION EPIDURAL; INFILTRATION; PERINEURAL at 15:00

## 2025-03-17 RX ADMIN — TRIAMCINOLONE ACETONIDE 40 MG: 40 INJECTION, SUSPENSION INTRA-ARTICULAR; INTRAMUSCULAR at 15:00
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
## 2025-03-17 NOTE — ASSESSMENT & PLAN NOTE
She is again symptomatic of her underlying osteoarthritis of the bilateral knees. We discussed repeating her corticosteroid injection of the right knee and visco supplementation injection of the left knee. She consented to and tolerated the procedure well for the corticosteroid injection of her right knee. Prior authorization was submitted for a Synvisc One injection of the left knee today. We will consider aspirating the left knee prior to administering the visco injection. She will follow-up once authorization has been obtained for the visco injection of the left knee.  Orders:    XR knee 4+ vw right injury; Future    XR knee 4+ vw left injury; Future    Injection Procedure Prior Authorization; Future    Large joint arthrocentesis: R knee

## 2025-03-21 RX ORDER — TRIAMCINOLONE ACETONIDE 40 MG/ML
40 INJECTION, SUSPENSION INTRA-ARTICULAR; INTRAMUSCULAR
Status: COMPLETED | OUTPATIENT
Start: 2025-03-17 | End: 2025-03-17

## 2025-03-21 RX ORDER — ROPIVACAINE HYDROCHLORIDE 2 MG/ML
4 INJECTION, SOLUTION EPIDURAL; INFILTRATION; PERINEURAL
Status: COMPLETED | OUTPATIENT
Start: 2025-03-17 | End: 2025-03-17

## 2025-03-23 PROBLEM — J06.9 VIRAL URI: Status: RESOLVED | Noted: 2025-02-21 | Resolved: 2025-03-23

## 2025-03-24 ENCOUNTER — VBI (OUTPATIENT)
Dept: ADMINISTRATIVE | Facility: OTHER | Age: 75
End: 2025-03-24

## 2025-03-27 ENCOUNTER — PROCEDURE VISIT (OUTPATIENT)
Dept: OBGYN CLINIC | Facility: CLINIC | Age: 75
End: 2025-03-27
Payer: MEDICARE

## 2025-03-27 DIAGNOSIS — M17.12 PRIMARY OSTEOARTHRITIS OF LEFT KNEE: Primary | ICD-10-CM

## 2025-03-27 DIAGNOSIS — G89.29 CHRONIC PAIN OF LEFT KNEE: ICD-10-CM

## 2025-03-27 DIAGNOSIS — M25.562 CHRONIC PAIN OF LEFT KNEE: ICD-10-CM

## 2025-03-27 PROCEDURE — 20610 DRAIN/INJ JOINT/BURSA W/O US: CPT

## 2025-03-27 NOTE — PROGRESS NOTES
Large joint arthrocentesis: L knee  Universal Protocol:  Consent: Verbal consent obtained.  Risks and benefits: risks, benefits and alternatives were discussed  Consent given by: patient  Timeout called at: 3/27/2025 8:48 AM.  Patient understanding: patient states understanding of the procedure being performed  Patient consent: the patient's understanding of the procedure matches consent given  Site marked: the operative site was marked  Radiology Images displayed and confirmed. If images not available, report reviewed: imaging studies available  Patient identity confirmed: verbally with patient  Supporting Documentation  Indications: pain   Procedure Details  Location: knee - L knee  Needle size: 22 G  Ultrasound guidance: no  Approach: anterolateral  Medications administered: 48 mg hylan 48 MG/6ML    Patient tolerance: patient tolerated the procedure well with no immediate complications  Dressing:  Sterile dressing applied      Mirna presents for her left knee injection to treat her left primary knee osteoarthritis. Today, the patient reports 4/10 left knee pain, but pain is a 10/10 at its worst. Her pain is currently minimal today. On exam, the patient has full ROM without joint effusion. After discussing the risks, benefits, and alternatives to injections, the patient consented for and underwent the procedure without any acute complications or difficulties. Post-injection instructions were reviewed. We will plan to follow up with Mirna as needed following this injection. We reviewed that Visco injections can occur every 6 months at a minimum, depending on symptoms.

## 2025-04-01 DIAGNOSIS — I10 ESSENTIAL HYPERTENSION: Primary | ICD-10-CM

## 2025-04-01 NOTE — TELEPHONE ENCOUNTER
Reason for call:   [x] Refill   [] Prior Auth  [] Other:     Office:   [x] PCP/Provider -   [] Specialty/Provider -     Medication:     losartan (COZAAR) 50 mg tablet       Dose/Frequency: gio 50 mg by mouth daily,     Pharmacy: [address] - [address] [place] [address] Pharmacy   Does the patient have enough for 3 days?   [x] Yes   [] No - Send as HP to POD

## 2025-04-02 RX ORDER — LOSARTAN POTASSIUM 50 MG/1
50 TABLET ORAL DAILY
Qty: 100 TABLET | Refills: 1 | Status: SHIPPED | OUTPATIENT
Start: 2025-04-02

## 2025-04-28 ENCOUNTER — TELEPHONE (OUTPATIENT)
Age: 75
End: 2025-04-28

## 2025-04-28 RX ORDER — CLOPIDOGREL BISULFATE 75 MG/1
75 TABLET ORAL DAILY
Qty: 90 TABLET | Refills: 1 | Status: CANCELLED | OUTPATIENT
Start: 2025-04-28

## 2025-04-28 NOTE — TELEPHONE ENCOUNTER
Patient called requesting refill for carvedilol. Patient made aware medication was refilled on 01/14/25 for 200 tablets with 1 refill at [facility]. Patient instructed to contact the pharmacy to obtain refills of medication. Patient verbalized understanding.

## 2025-04-28 NOTE — TELEPHONE ENCOUNTER
Mediation historically managed by cardiologist. Patient is requesting refill from PCP    Reason for call:   [x] Refill   [] Prior Auth  [] Other:     Office:   [x] PCP/Provider - DIMA Jiang    Medication: clopidogrel    Dose/Frequency: 75mg qd    Quantity: 90    Pharmacy: [address] Pharmacy   Does the patient have enough for 3 days?   [x] Yes   [] No - Send as HP to POD    Mail Away Pharmacy   Does the patient have enough for 10 days?   [] Yes   [] No - Send as HP to POD

## 2025-04-29 NOTE — TELEPHONE ENCOUNTER
Called pt and informed her she needed to call her cardiologist. Pt is very upset and states she will tell her dr and does not want to deal with Nps or Pas.

## 2025-05-06 ENCOUNTER — DOCUMENTATION (OUTPATIENT)
Dept: ADMINISTRATIVE | Facility: OTHER | Age: 75
End: 2025-05-06

## 2025-05-06 NOTE — PROGRESS NOTES
05/06/25 2:19 PM    Osteoporosis Management Post Fracture outreach is not required; there is an active DEXA screening order on file.    Thank you.  Bella Mercado  [facility]

## 2025-05-13 ENCOUNTER — APPOINTMENT (OUTPATIENT)
Dept: LAB | Facility: HOSPITAL | Age: 75
End: 2025-05-13
Attending: INTERNAL MEDICINE
Payer: MEDICARE

## 2025-05-13 DIAGNOSIS — R79.81 ABNORMAL ARTERIAL BLOOD GASES: ICD-10-CM

## 2025-05-13 LAB
ANION GAP SERPL CALCULATED.3IONS-SCNC: 7 MMOL/L (ref 4–13)
APTT PPP: 29 SECONDS (ref 23–34)
BUN SERPL-MCNC: 22 MG/DL (ref 5–25)
CALCIUM SERPL-MCNC: 9.1 MG/DL (ref 8.4–10.2)
CHLORIDE SERPL-SCNC: 107 MMOL/L (ref 96–108)
CO2 SERPL-SCNC: 26 MMOL/L (ref 21–32)
CREAT SERPL-MCNC: 1.08 MG/DL (ref 0.6–1.3)
ERYTHROCYTE [DISTWIDTH] IN BLOOD BY AUTOMATED COUNT: 13.2 % (ref 11.6–15.1)
GFR SERPL CREATININE-BSD FRML MDRD: 49 ML/MIN/1.73SQ M
GLUCOSE P FAST SERPL-MCNC: 100 MG/DL (ref 65–99)
HCT VFR BLD AUTO: 36.6 % (ref 34.8–46.1)
HGB BLD-MCNC: 11.7 G/DL (ref 11.5–15.4)
INR PPP: 1.04 (ref 0.85–1.19)
MCH RBC QN AUTO: 28.1 PG (ref 26.8–34.3)
MCHC RBC AUTO-ENTMCNC: 32 G/DL (ref 31.4–37.4)
MCV RBC AUTO: 88 FL (ref 82–98)
PLATELET # BLD AUTO: 270 THOUSANDS/UL (ref 149–390)
PMV BLD AUTO: 9.1 FL (ref 8.9–12.7)
POTASSIUM SERPL-SCNC: 4.6 MMOL/L (ref 3.5–5.3)
PROTHROMBIN TIME: 14 SECONDS (ref 12.3–15)
RBC # BLD AUTO: 4.16 MILLION/UL (ref 3.81–5.12)
SODIUM SERPL-SCNC: 140 MMOL/L (ref 135–147)
WBC # BLD AUTO: 5.88 THOUSAND/UL (ref 4.31–10.16)

## 2025-05-13 PROCEDURE — 85730 THROMBOPLASTIN TIME PARTIAL: CPT

## 2025-05-13 PROCEDURE — 80048 BASIC METABOLIC PNL TOTAL CA: CPT

## 2025-05-13 PROCEDURE — 85610 PROTHROMBIN TIME: CPT

## 2025-05-13 PROCEDURE — 85027 COMPLETE CBC AUTOMATED: CPT

## 2025-05-13 PROCEDURE — 36415 COLL VENOUS BLD VENIPUNCTURE: CPT

## 2025-05-22 ENCOUNTER — TELEPHONE (OUTPATIENT)
Age: 75
End: 2025-05-22

## 2025-05-22 NOTE — TELEPHONE ENCOUNTER
Spoke with patient and let her know that she is good to have a regular screening mammogram for next year.  The test was normal.  She was concerned when it stated screening recommended, but I told her that it is for her regular screening mammogram.  She is all good

## 2025-05-22 NOTE — TELEPHONE ENCOUNTER
Call from Marito at [facility] stating patient called to schedule repeat mammogram. States patient did not review results with provider, saw results on own and thought she needed additional imaging. Marito requesting provider review results with patient as patient does not need additional imaging.

## 2025-05-27 ENCOUNTER — OFFICE VISIT (OUTPATIENT)
Age: 75
End: 2025-05-27
Payer: MEDICARE

## 2025-05-27 VITALS
DIASTOLIC BLOOD PRESSURE: 66 MMHG | BODY MASS INDEX: 28.77 KG/M2 | WEIGHT: 152.4 LBS | TEMPERATURE: 97.5 F | SYSTOLIC BLOOD PRESSURE: 114 MMHG | HEART RATE: 66 BPM | HEIGHT: 61 IN | OXYGEN SATURATION: 98 %

## 2025-05-27 DIAGNOSIS — K52.9 GASTROENTERITIS: Primary | ICD-10-CM

## 2025-05-27 PROCEDURE — 99213 OFFICE O/P EST LOW 20 MIN: CPT | Performed by: NURSE PRACTITIONER

## 2025-05-27 PROCEDURE — G2211 COMPLEX E/M VISIT ADD ON: HCPCS | Performed by: NURSE PRACTITIONER

## 2025-05-27 RX ORDER — ONDANSETRON 4 MG/1
4 TABLET, FILM COATED ORAL EVERY 8 HOURS PRN
Qty: 10 TABLET | Refills: 0 | Status: SHIPPED | OUTPATIENT
Start: 2025-05-27

## 2025-05-27 RX ORDER — DICYCLOMINE HYDROCHLORIDE 10 MG/1
10 CAPSULE ORAL
Qty: 30 CAPSULE | Refills: 0 | Status: SHIPPED | OUTPATIENT
Start: 2025-05-27

## 2025-05-27 NOTE — ASSESSMENT & PLAN NOTE
Symptoms are likely viral in origin and self limiting.  Do not use antidiarrheals.  Maintain adequate rest.  Reviewed importance of adequate fluid intake (small frequent sips) to prevent dehydration.  Encouraged use of electrolyte containing sports drinks (Gatorade or Powerade) mixed with water and take small frequent sips throughout the day.  Early refeeding can reduce illness duration and improve outcomes.  Encourage small bites, bland diet.  Avoid fatty foods, greasy foods, and dairy which all may worsen symptoms.  Discussed hand hygiene to prevent spread.  Minimize close contact with other individuals while symptomatic.  Gradual advancement of diet from bland foods to regular diet.  Call the office if symptoms worsen or do not improve.  Discussed red flag and ER precautions which need immediate eval and treat.        Orders:    ondansetron (ZOFRAN) 4 mg tablet; Take 1 tablet (4 mg total) by mouth every 8 (eight) hours as needed for nausea or vomiting    dicyclomine (BENTYL) 10 mg capsule; Take 1 capsule (10 mg total) by mouth 4 (four) times a day (before meals and at bedtime)

## 2025-05-27 NOTE — PROGRESS NOTES
Name: Mirna Boswell      : 1950      MRN: [id number]  Encounter Provider: DANDRE Kelly  Encounter Date: 2025   Encounter department: [facility] [place]  :  Assessment & Plan  Gastroenteritis  Symptoms are likely viral in origin and self limiting.  Do not use antidiarrheals.  Maintain adequate rest.  Reviewed importance of adequate fluid intake (small frequent sips) to prevent dehydration.  Encouraged use of electrolyte containing sports drinks (Gatorade or Powerade) mixed with water and take small frequent sips throughout the day.  Early refeeding can reduce illness duration and improve outcomes.  Encourage small bites, bland diet.  Avoid fatty foods, greasy foods, and dairy which all may worsen symptoms.  Discussed hand hygiene to prevent spread.  Minimize close contact with other individuals while symptomatic.  Gradual advancement of diet from bland foods to regular diet.  Call the office if symptoms worsen or do not improve.  Discussed red flag and ER precautions which need immediate eval and treat.        Orders:    ondansetron (ZOFRAN) 4 mg tablet; Take 1 tablet (4 mg total) by mouth every 8 (eight) hours as needed for nausea or vomiting    dicyclomine (BENTYL) 10 mg capsule; Take 1 capsule (10 mg total) by mouth 4 (four) times a day (before meals and at bedtime)           History of Present Illness   Patient present to office due to complaints of diarrhea.   States began . States she ate a hamburger and hotdog at work and then work up .    Last episode was prior to coming to the office. States she is going about 6-7 times a day. States she does feel crampy in her abdomen and has nausea. States she does feel drained.     States she is trying to stay hydrated. States she does not have an appetite, reports she did have toast this morning.     Denies fevers and URI symptoms. Denies SOB, chest pain, palpations, or headaches.       Review of  "Systems   Constitutional:  Negative for activity change, appetite change, chills, diaphoresis and fever.   HENT:  Negative for congestion, ear discharge, ear pain, postnasal drip, rhinorrhea, sinus pressure, sinus pain and sore throat.    Eyes:  Negative for pain, discharge, itching and visual disturbance.   Respiratory:  Negative for cough, chest tightness, shortness of breath and wheezing.    Cardiovascular:  Negative for chest pain, palpitations and leg swelling.   Gastrointestinal:  Positive for diarrhea and nausea. Negative for abdominal pain, constipation and vomiting.   Endocrine: Negative for polydipsia, polyphagia and polyuria.   Genitourinary:  Negative for difficulty urinating, dysuria and urgency.   Musculoskeletal:  Negative for arthralgias, back pain and neck pain.   Skin:  Negative for rash and wound.   Neurological:  Negative for dizziness, weakness, numbness and headaches.       Objective   /66 (BP Location: Left arm, Patient Position: Sitting, Cuff Size: Standard)   Pulse 66   Temp 97.5 °F (36.4 °C) (Temporal)   Ht 5' 1.02\" (1.55 m)   Wt 69.1 kg (152 lb 6.4 oz)   SpO2 98%   BMI 28.78 kg/m²      Physical Exam  Constitutional:       General: She is not in acute distress.     Appearance: She is well-developed. She is not diaphoretic.   HENT:      Head: Normocephalic and atraumatic.      Right Ear: External ear normal.      Left Ear: External ear normal.      Nose: Nose normal.      Mouth/Throat:      Mouth: Mucous membranes are moist.      Pharynx: No oropharyngeal exudate or posterior oropharyngeal erythema.     Eyes:      General:         Right eye: No discharge.         Left eye: No discharge.      Conjunctiva/sclera: Conjunctivae normal.      Pupils: Pupils are equal, round, and reactive to light.     Neck:      Thyroid: No thyromegaly.     Cardiovascular:      Rate and Rhythm: Normal rate and regular rhythm.      Heart sounds: Normal heart sounds. No murmur heard.     No friction rub. " No gallop.   Pulmonary:      Effort: Pulmonary effort is normal. No respiratory distress.      Breath sounds: Normal breath sounds. No stridor. No wheezing or rales.   Abdominal:      General: Bowel sounds are normal. There is no distension.      Palpations: Abdomen is soft.      Tenderness: There is no abdominal tenderness.     Musculoskeletal:      Cervical back: Normal range of motion and neck supple.   Lymphadenopathy:      Cervical: No cervical adenopathy.     Skin:     General: Skin is warm and dry.      Findings: No erythema or rash.     Neurological:      Mental Status: She is alert and oriented to person, place, and time.     Psychiatric:         Behavior: Behavior normal.         Thought Content: Thought content normal.         Judgment: Judgment normal.

## 2025-05-29 ENCOUNTER — TELEPHONE (OUTPATIENT)
Age: 75
End: 2025-05-29

## 2025-05-29 NOTE — TELEPHONE ENCOUNTER
Pt seen in office on 5/27/25 and still has nausea and diarrhea. Pt was supposed to return to work on 5/30/25, but is still not feeling well enough. Please change return to work date to Monday 6/2/25, and if symptoms persist she will call back.    Pt reported to her work that she would be out sick, she reports through a system called [employer], in case her employer calls.

## 2025-05-30 ENCOUNTER — NURSE TRIAGE (OUTPATIENT)
Age: 75
End: 2025-05-30

## 2025-05-30 DIAGNOSIS — R19.7 DIARRHEA OF PRESUMED INFECTIOUS ORIGIN: Primary | ICD-10-CM

## 2025-05-30 NOTE — TELEPHONE ENCOUNTER
Please call patient and tell her to obtain stool studies order placed, so GI can rule out infection as underlying cause of diarrhea in the meanwhile she should do a high-fiber diet and start fiber supplements Metamucil 2 heaping teaspoons daily.  If diarrhea increases in severity or is associated with  abdominal pain nausea or vomiting or she is unable to eat or drink recommend that she go to the ED for further evaluation where they can do imaging immediately.

## 2025-05-30 NOTE — TELEPHONE ENCOUNTER
"  REASON FOR CONVERSATION: Diarrhea    SYMPTOMS: Diarrhea since last Sunday going more than 5x a day, no appetite and nausea.       OTHER HEALTH INFORMATION: went to see PCP on 5/27 was given zofran and bentyl but not helping     PROTOCOL DISPOSITION: 72 Hour Provider Response    CARE ADVICE PROVIDED: stay hydrated and ED recs given for dehydration     PRACTICE FOLLOW-UP: Please advise no sooner OV             Reason for Disposition   Moderate illness: watery diarrhea>5 episodes, able to function but forced to change daily activities    Answer Assessment - Initial Assessment  1. When did this start and how frequent are your bowel movements? (when/number/day, hour)   Sunday 18th   2. What is the consistency of your bowel movements?   Loose watery   3. Is there any blood/pus in your stools? (Yes - how much (drops, clots, profuse)   No   4. What is normal for you?     5. Is there an urgency with meals?   Yes   6. ABDOMINAL PAIN: \"Are you having any abdominal pain?\" If yes, ask: \"What does it feel like?\" (e.g., crampy, dull, intermittent, constant)   Cramping   ABDOMINAL PAIN SEVERITY: If present, ask: \"How bad is the pain?\"  (e.g., Scale 1-10; mild, moderate, or severe)  7/10     - MILD (1-3): does not interfere with normal activities, abdomen soft and not tender to touch    - MODERATE (4-7): interferes with normal activities or awakens from sleep, tender to touch   - SEVERE (8-10): excruciating pain, doubled over, unable to do any normal activities    Do you have a fever? (Yes - what was it? Did it resolve with treatment (NSAIDs/acetaminophen)?   No fever   7. VOMITING: \"Are you also vomiting?\" If yes, ask: \"How many times in the past 24 hours?\"   No   8. Are you experiencing any associated symptoms including nausea, bloating, flatulence, tenesmus, or weight loss?   Nausea, no appetite   9. Are you able to tolerate anything by mouth and maintain your hydration? (Assess for dehydration by inquiring about dry/cracked " lips, decreased urine output, lightheadedness, heart racing, etc.)   Yes is dehydrated   10. Are you currently taking anything for current symptoms? (Loperamide, oral fluids)   Zofran and bentyl   Pepto   11. Do you have any history of C. diff infection (Clostridioides difficile), recent antibiotics, hospitalization, sick contacts, suspicious oral intake, recent travel, GI surgery, PPI (proton pump inhibitor) use, chemotherapy, enteral feeding   Suspicious Oral Intake- possibly   12. Do you have any history of other conditions such as IBD (Irritable bowel disease), IBS-D (irritable bowel syndrome)? If yes, consider alternative protocol.  No   13. Have you recently had any stool studies (including fecal calprotectin), bloodwork such as a complete blood count, complete metabolic profile, c-reactive protein, erythrocyte sedimentation rate (CBC/CMP/CRP/ESR), imaging (ex: CT (Computed Tomography) scan), or procedures (ex: colonoscopy)? No    Protocols used: GI-Acute Diarrheal Infections-ADULT-[place]

## 2025-05-30 NOTE — TELEPHONE ENCOUNTER
"Pt made aware and verbalized understanding. States her cardiologist started her on Levaquin in case she has a \"stomach bug\". Has taken 1 dose thus far. plans to hold; will submit stool studies tomorrow. Will resume ABX only if GI recommends.  "

## 2025-05-30 NOTE — TELEPHONE ENCOUNTER
please call and inform patient that if his stool studies are negative then she can resume the antibiotics but need to make sure that she does not have an underlying infection which requires a different type of medication to treat first

## 2025-05-31 ENCOUNTER — APPOINTMENT (OUTPATIENT)
Dept: LAB | Facility: HOSPITAL | Age: 75
End: 2025-05-31

## 2025-05-31 DIAGNOSIS — R19.7 DIARRHEA OF PRESUMED INFECTIOUS ORIGIN: ICD-10-CM

## 2025-06-01 ENCOUNTER — APPOINTMENT (OUTPATIENT)
Dept: LAB | Facility: CLINIC | Age: 75
End: 2025-06-01
Attending: NURSE PRACTITIONER
Payer: MEDICARE

## 2025-06-01 DIAGNOSIS — R19.7 DIARRHEA OF PRESUMED INFECTIOUS ORIGIN: Primary | ICD-10-CM

## 2025-06-01 PROCEDURE — 87506 IADNA-DNA/RNA PROBE TQ 6-11: CPT

## 2025-06-01 PROCEDURE — 87209 SMEAR COMPLEX STAIN: CPT

## 2025-06-01 PROCEDURE — 87177 OVA AND PARASITES SMEARS: CPT

## 2025-06-02 ENCOUNTER — RESULTS FOLLOW-UP (OUTPATIENT)
Dept: OTHER | Facility: HOSPITAL | Age: 75
End: 2025-06-02

## 2025-06-02 LAB
C COLI+JEJUNI TUF STL QL NAA+PROBE: NEGATIVE
C DIFF TOX GENS STL QL NAA+PROBE: NEGATIVE
EC STX1+STX2 GENES STL QL NAA+PROBE: NEGATIVE
SALMONELLA SP SPAO STL QL NAA+PROBE: NEGATIVE
SHIGELLA SP+EIEC IPAH STL QL NAA+PROBE: NEGATIVE

## 2025-06-02 NOTE — TELEPHONE ENCOUNTER
Please call and inform patient recommend that she take the dicyclomine as previously ordered this will help with the diarrhea and abdominal pain because you can get a postinfectious irritable bowel syndrome diarrhea.  Also recommend she take Zofran as needed for nausea. Please schedule for urgent appointment for further evaluation with whomever is available.   Continue Regimen: spironolactone 25 mg tablet Take 2 po QAM. \\nAczone 7.5% gel Apply a pea sized amount to face and back QAM\\nEpiduo Forte- apply pea-sized amount to entire face after cleansing qhs as tolerated\\nClindamycin gel apply a pea sized amount to face QHS after application of epiduo forte Detail Level: Zone Initiate Treatment: MCN 100mg take one tab po bid with food Plan: -Discussed R/B/SE with patient of spironolactone\\n-B/P taken in office today\\nDisc bring parents to NOV and plan to disc Accutane

## 2025-06-02 NOTE — TELEPHONE ENCOUNTER
Patient calling to schedule an appointment. States she has had diarrhea and nausea since 5/24/25. Completed the stool studies that were just ordered on 5/30/25, results not back as of yet.  Is still taking the Levaquin that was prescribed by Cardiology. Did not take the Dicyclomine or Zofran ordered by PCP on 5/27/25. Patient state that today that the diarrhea is not bad but is still nauseous. Would like to be seen but no appointments available at this time until August/September.

## 2025-06-02 NOTE — TELEPHONE ENCOUNTER
Letter generated and sent to patient's mychart. Spoke with patient and she stated she got in contact with her gastro DR and they ordered a stool test, still in process. She is feeling slightly better but is still having symptoms. She is requesting if letter can be extended for 06/04/2025 for her job.

## 2025-06-03 NOTE — TELEPHONE ENCOUNTER
Sejal called and requested that her note be extended through Thursday of this week with a return date of Friday (June 6th). She stated she was out in the heat yesterday and it wore her out more. She has a gastro appointment on Thursday and hopes to return to work after that.    She is also requesting if the note can state that she is not to be out in the heat for a week after returning.    Please reach out to sejal if/when note modifications are made.

## 2025-06-03 NOTE — TELEPHONE ENCOUNTER
Spoke with patient she is aware if her symptoms do not improve to make an appointment in the office. Letter generated and sent to patient's mychart.

## 2025-06-04 RX ORDER — LISINOPRIL 20 MG/1
TABLET ORAL
Qty: 90 TABLET | Refills: 2 | OUTPATIENT
Start: 2025-06-04

## 2025-06-05 ENCOUNTER — OFFICE VISIT (OUTPATIENT)
Dept: GASTROENTEROLOGY | Facility: CLINIC | Age: 75
End: 2025-06-05
Payer: MEDICARE

## 2025-06-05 VITALS
HEIGHT: 61 IN | SYSTOLIC BLOOD PRESSURE: 124 MMHG | BODY MASS INDEX: 28.7 KG/M2 | DIASTOLIC BLOOD PRESSURE: 69 MMHG | HEART RATE: 66 BPM | WEIGHT: 152 LBS

## 2025-06-05 DIAGNOSIS — K21.9 GASTROESOPHAGEAL REFLUX DISEASE WITHOUT ESOPHAGITIS: ICD-10-CM

## 2025-06-05 DIAGNOSIS — R19.7 DIARRHEA OF PRESUMED INFECTIOUS ORIGIN: Primary | ICD-10-CM

## 2025-06-05 PROCEDURE — 99214 OFFICE O/P EST MOD 30 MIN: CPT | Performed by: PHYSICIAN ASSISTANT

## 2025-06-05 NOTE — PROGRESS NOTES
Name: Mirna Boswell      : 1950      MRN: [id number]  Encounter Provider: Mehreen Liao PA-C  Encounter Date: 2025   Encounter department: [facility]STRO[facility]OLOG[facility] [place] [address]  :  Assessment & Plan  Diarrhea of presumed infectious origin  Patient with suspected infectious diarrhea which is since resolved stool studies negative thus far and ova and parasite still pending and patient was treated, by another provider with Levaquin which she thinks helped, continue supportive care, continue rehydration       Gastroesophageal reflux disease without esophagitis  Patient with history of GERD, reflux symptoms controlled with omeprazole, she reports she has been tolerating the 20 mg dose, endoscopically it did 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 female who presents for urgent office visit.  Patient was having diarrhea and stool studies were ordered.  C. difficile was negative as well as stool enteric panel.  Ova and parasite pending.  She currently states that she is feeling better now but she was going 8-10 times daily for 1 week and she states that she ate a hot dog at work and she started having significant epigastric distress followed by significant diarrhea.  She denied any nausea or vomiting.  Did have some similar episode a few weeks ago which resolved quickly.  Generally reflux symptoms are controlled with the omeprazole.  Patient is otherwise states that she went to her cardiologist who ordered Levaquin for 5 days and she thinks that helped with her diarrheal symptoms and she has had no bowel movements today.  EGD and colonoscopy were performed in  as below.              Colonoscopy done 2023 showed all observed locations appeared normal.  No further screening  "colonoscopies recommended due to age. EGD done 9/25/2023 showed irregular Z-line.  Singer's esophagus observed with 1 tongue and no associated lesions.  Biopsies taken.  Biopsy was negative for Singer's esophagus.  Biopsy showed squamocolumnar mucosa with no significant histopathological abnormalities.        Review of Systems A complete review of systems is negative other than that noted above in the HPI.      Current Medications[1]  Objective   /69 (BP Location: Left arm, Patient Position: Sitting, Cuff Size: Standard)   Pulse 66   Ht 5' 1\" (1.549 m)   Wt 68.9 kg (152 lb)   BMI 28.72 kg/m²     Physical Exam   General Alert no acute distress  Heart normal S1-S2  Lungs clear to auscultation   Abdomen soft positive bowel sounds nontender nondistended        Lab Results: I personally reviewed relevant lab results.       Results for orders placed during the hospital encounter of 09/25/23    EGD    Narrative  Table formatting from the original result was not included.  [facility] [address] [place] [address] [phone]      DATE OF SERVICE:  9/25/23    PHYSICIAN(S):  Attending:  Kar Groves MD    Fellow:  No Staff Documented      INDICATION:  Esophageal dysphagia, Gastroesophageal reflux disease without esophagitis    POST-OP DIAGNOSIS:  See the impression below.    PREPROCEDURE:  Informed consent was obtained for the procedure, including sedation.  Risks of perforation, hemorrhage, adverse drug reaction and aspiration were discussed. The patient was placed in the left lateral decubitus position.    Patient was explained about the risks and benefits of the procedure. Risks including but not limited to bleeding, infection, and perforation were explained in detail. Also explained about less than 100% sensitivity with the exam and other alternatives.    PROCEDURE: EGD    DETAILS OF PROCEDURE:  Patient was taken to the procedure room where a time out " was performed to confirm correct patient and correct procedure. The patient underwent monitored anesthesia care, which was administered by an anesthesia professional. The patient's blood pressure, heart rate, level of consciousness, respirations and oxygen were monitored throughout the procedure. The scope was advanced to the second part of the duodenum. Retroflexion was performed in the fundus. The patient experienced no blood loss. The procedure was not difficult. The patient tolerated the procedure well. There were no apparent adverse events.    ANESTHESIA INFORMATION:  ASA: III  Anesthesia Type: IV Sedation with Anesthesia    MEDICATIONS:  No administrations occurring from 0910 to 0919 on 09/25/23      FINDINGS:  Irregular Z-line 37 cm from the incisors  Singer's esophagus observed with 1 tongue and no associated lesion. The Z-line was 37 cm from the incisors and the top of gastric folds was 38 cm from the incisors. Questionable Singer's esophagus.  Biopsies taken from 9:00 and 3:00 positions  Esophageal stricture not seen.  No cause of dysphagia identified that it may be secondary to dry mouth.      SPECIMENS:  * No specimens in log *        Impression  Irregular Z-line  Singer's esophagus      RECOMMENDATION:  Follow up with PCP  Chewing food well and drinking liquids with dry food bolus recommended.  Repeat endoscopy as needed on clinical basis.  Office follow-up in 1 year              Kar Groves MD             [1]   Current Outpatient Medications   Medication Sig Dispense Refill    aspirin 81 mg chewable tablet Chew 81 mg in the morning.      atorvastatin (LIPITOR) 20 mg tablet Take 1 tablet (20 mg total) by mouth daily 90 tablet 1    carvedilol (COREG) 12.5 mg tablet Take 1 tablet (12.5 mg total) by mouth 2 (two) times a day with meals 200 tablet 1    clopidogrel (PLAVIX) 75 mg tablet Take 75 mg by mouth in the morning.      gabapentin (NEURONTIN) 300 mg capsule Take 1 capsule (300 mg total) by  mouth daily (Patient taking differently: Take 300 mg by mouth daily as needed As needed) 30 capsule 0    losartan (COZAAR) 50 mg tablet Take 1 tablet (50 mg total) by mouth daily 100 tablet 1    omeprazole (PriLOSEC) 20 mg delayed release capsule Take 20 mg by mouth in the morning.      ondansetron (ZOFRAN) 4 mg tablet Take 1 tablet (4 mg total) by mouth every 8 (eight) hours as needed for nausea or vomiting 10 tablet 0    chlorpheniramine (CHLOR-TRIMETON) 4 MG tablet Take 1 tablet (4 mg total) by mouth every 6 (six) hours as needed for rhinitis (Patient not taking: Reported on 3/17/2025) 30 tablet 0    dicyclomine (BENTYL) 10 mg capsule Take 1 capsule (10 mg total) by mouth 4 (four) times a day (before meals and at bedtime) (Patient not taking: Reported on 6/5/2025) 30 capsule 0    Lidocaine Viscous HCl (XYLOCAINE) 2 % mucosal solution Swish and spit 5 mL 4 (four) times a day as needed for mouth pain or discomfort (Patient not taking: Reported on 3/17/2025) 100 mL 2    omeprazole (PriLOSEC) 40 MG capsule Take 1 capsule (40 mg total) by mouth daily Take 1/2 hour prior to breakfast (Patient not taking: Reported on 5/27/2025) 30 capsule 5     No current facility-administered medications for this visit.

## 2025-06-05 NOTE — LETTER
June 5, 2025     Mirna Boswell    Patient: Mirna Boswell   YOB: 1950   Date of Visit: 6/5/2025     To whom it may concern-  Patient is under our care and we are advising patient to remain out of the heat due to recent GI ilness for the next week.  Please feel free to contact us with any questions or concerns.                  Sincerely,        Mehreen Liao PA-C

## 2025-06-05 NOTE — ASSESSMENT & PLAN NOTE
Patient with history of GERD, reflux symptoms controlled with omeprazole, she reports she has been tolerating the 20 mg dose, endoscopically it did appear she had Singer's esophagus although biopsies were negative, plan to repeat EGD pending clinical course based on symptomatology      We will see in 1 year for follow-up

## 2025-06-06 DIAGNOSIS — R51.9 RIGHT SIDED TEMPORAL HEADACHE: ICD-10-CM

## 2025-06-06 RX ORDER — GABAPENTIN 300 MG/1
300 CAPSULE ORAL DAILY
Qty: 30 CAPSULE | Refills: 0 | Status: SHIPPED | OUTPATIENT
Start: 2025-06-06

## 2025-06-06 NOTE — TELEPHONE ENCOUNTER
States that this is from ortho    Reason for call:   [x] Refill  [] Prior Auth  [] Other:     Office:   [] PCP/Provider -   [x] Specialty/Provider - Momo Macias  [facility]        Medication: Gabapentin    Dose/Frequency: 300 mg     Quantity: #30    Pharmacy: [facility] [address] [place] [address] Pharmacy   Does the patient have enough for 3 days?   [] Yes   [x] No - Send as HP to POD    Mail Away Pharmacy   Does the patient have enough for 10 days?   [] Yes   [] No - Send as HP to POD

## 2025-06-07 LAB
G LAMBLIA AG STL QL IA: NEGATIVE
O+P STL CONC: NORMAL

## 2025-06-26 PROBLEM — K52.9 GASTROENTERITIS: Status: RESOLVED | Noted: 2025-05-27 | Resolved: 2025-06-26

## 2025-07-03 ENCOUNTER — OFFICE VISIT (OUTPATIENT)
Age: 75
End: 2025-07-03

## 2025-07-03 ENCOUNTER — APPOINTMENT (OUTPATIENT)
Dept: LAB | Facility: CLINIC | Age: 75
End: 2025-07-03
Payer: MEDICARE

## 2025-07-03 VITALS
DIASTOLIC BLOOD PRESSURE: 78 MMHG | WEIGHT: 148.8 LBS | TEMPERATURE: 97.7 F | OXYGEN SATURATION: 99 % | HEIGHT: 61 IN | HEART RATE: 64 BPM | SYSTOLIC BLOOD PRESSURE: 114 MMHG | BODY MASS INDEX: 28.09 KG/M2

## 2025-07-03 DIAGNOSIS — E83.42 HYPOMAGNESEMIA: ICD-10-CM

## 2025-07-03 DIAGNOSIS — R20.0 RIGHT FACIAL NUMBNESS: ICD-10-CM

## 2025-07-03 DIAGNOSIS — R20.0 RIGHT FACIAL NUMBNESS: Primary | ICD-10-CM

## 2025-07-03 LAB
ANION GAP SERPL CALCULATED.3IONS-SCNC: 6 MMOL/L (ref 4–13)
BASOPHILS # BLD AUTO: 0.06 THOUSANDS/ÂΜL (ref 0–0.1)
BASOPHILS NFR BLD AUTO: 1 % (ref 0–1)
BUN SERPL-MCNC: 19 MG/DL (ref 5–25)
CALCIUM SERPL-MCNC: 9.5 MG/DL (ref 8.4–10.2)
CHLORIDE SERPL-SCNC: 108 MMOL/L (ref 96–108)
CO2 SERPL-SCNC: 27 MMOL/L (ref 21–32)
CREAT SERPL-MCNC: 1.07 MG/DL (ref 0.6–1.3)
CRP SERPL QL: 1.6 MG/L
EOSINOPHIL # BLD AUTO: 0.28 THOUSAND/ÂΜL (ref 0–0.61)
EOSINOPHIL NFR BLD AUTO: 4 % (ref 0–6)
ERYTHROCYTE [DISTWIDTH] IN BLOOD BY AUTOMATED COUNT: 13.2 % (ref 11.6–15.1)
ERYTHROCYTE [SEDIMENTATION RATE] IN BLOOD: 29 MM/HOUR (ref 0–29)
GFR SERPL CREATININE-BSD FRML MDRD: 50 ML/MIN/1.73SQ M
GLUCOSE SERPL-MCNC: 98 MG/DL (ref 65–140)
HCT VFR BLD AUTO: 36.7 % (ref 34.8–46.1)
HGB BLD-MCNC: 12.3 G/DL (ref 11.5–15.4)
IMM GRANULOCYTES # BLD AUTO: 0.01 THOUSAND/UL (ref 0–0.2)
IMM GRANULOCYTES NFR BLD AUTO: 0 % (ref 0–2)
LYMPHOCYTES # BLD AUTO: 1.89 THOUSANDS/ÂΜL (ref 0.6–4.47)
LYMPHOCYTES NFR BLD AUTO: 28 % (ref 14–44)
MAGNESIUM SERPL-MCNC: 1.8 MG/DL (ref 1.9–2.7)
MCH RBC QN AUTO: 29.6 PG (ref 26.8–34.3)
MCHC RBC AUTO-ENTMCNC: 33.5 G/DL (ref 31.4–37.4)
MCV RBC AUTO: 88 FL (ref 82–98)
MONOCYTES # BLD AUTO: 0.68 THOUSAND/ÂΜL (ref 0.17–1.22)
MONOCYTES NFR BLD AUTO: 10 % (ref 4–12)
NEUTROPHILS # BLD AUTO: 3.93 THOUSANDS/ÂΜL (ref 1.85–7.62)
NEUTS SEG NFR BLD AUTO: 57 % (ref 43–75)
NRBC BLD AUTO-RTO: 0 /100 WBCS
PLATELET # BLD AUTO: 265 THOUSANDS/UL (ref 149–390)
PMV BLD AUTO: 9.5 FL (ref 8.9–12.7)
POTASSIUM SERPL-SCNC: 4.5 MMOL/L (ref 3.5–5.3)
RBC # BLD AUTO: 4.15 MILLION/UL (ref 3.81–5.12)
SODIUM SERPL-SCNC: 141 MMOL/L (ref 135–147)
VIT B12 SERPL-MCNC: 288 PG/ML (ref 180–914)
WBC # BLD AUTO: 6.85 THOUSAND/UL (ref 4.31–10.16)

## 2025-07-03 PROCEDURE — 83735 ASSAY OF MAGNESIUM: CPT

## 2025-07-03 PROCEDURE — 36415 COLL VENOUS BLD VENIPUNCTURE: CPT

## 2025-07-03 PROCEDURE — 80048 BASIC METABOLIC PNL TOTAL CA: CPT

## 2025-07-03 PROCEDURE — 82607 VITAMIN B-12: CPT

## 2025-07-03 PROCEDURE — 85652 RBC SED RATE AUTOMATED: CPT

## 2025-07-03 PROCEDURE — 86140 C-REACTIVE PROTEIN: CPT

## 2025-07-03 PROCEDURE — 85025 COMPLETE CBC W/AUTO DIFF WBC: CPT

## 2025-07-03 RX ORDER — LEVOFLOXACIN 250 MG/1
1 TABLET, FILM COATED ORAL 2 TIMES DAILY
COMMUNITY
Start: 2025-05-29 | End: 2025-07-03

## 2025-07-03 NOTE — PROGRESS NOTES
Name: Mirna Boswell      : 1950      MRN: [id number]  Encounter Provider: Shay Benjamin MD  Encounter Date: 7/3/2025   Encounter department: [facility] [place]  :  Assessment & Plan  Right facial numbness    Suspect patient's persistent right facial numbness is secondary to dental intervention from a few months ago. 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  No other cranial nerve deficits on exam today.  No facial paralysis/weakness to indicate Bell's palsy.  We will obtain a CT head without contrast and blood work to rule out other causes.  Patient advised to continue using gabapentin as needed for symptomatic relief in the meantime.  Will follow-up with imaging and blood work and proceed as necessary.    Orders:    CBC and differential; Future    Basic metabolic panel; Future    Vitamin B12; Future    Magnesium; Future    Sedimentation rate, automated; Future    C-reactive protein; Future    CT head wo contrast; Future           History of Present Illness   Patient is a 74-year-old female presenting as a same-day for right sided facial numbness.  Symptoms initially started 2-1/2 weeks ago.  She reports numbness primarily on the right side of her upper lip and feels that it radiates to her right cheek.  She has mild discomfort in that area at times.  She has used gabapentin as needed which helps minimize the discomfort.  She has intermittent headaches but no more than usual and they have not worsened over the last month.  No vision changes but she has chronic tearing in her eyes bilaterally.  This has not changed over the last month.  No changes in her hearing.  No difficulty chewing or swallowing.  No facial changes that she is aware of.  She had a dental procedure one of her right upper teeth a few months ago.  She has had 2 episodes of gastroenteritis over the last few months.  Otherwise she is doing well and has no acute  "concerns.      Review of Systems   Constitutional:  Negative for chills and fever.   HENT:  Negative for ear pain and sore throat.    Eyes:  Negative for pain and visual disturbance.   Respiratory:  Negative for cough and shortness of breath.    Cardiovascular:  Negative for chest pain and palpitations.   Gastrointestinal:  Negative for abdominal pain and vomiting.   Genitourinary:  Negative for dysuria and hematuria.   Musculoskeletal:  Negative for arthralgias and back pain.   Skin:  Negative for color change and rash.   Neurological:  Positive for numbness. Negative for seizures and syncope.   All other systems reviewed and are negative.      Objective   /78 (BP Location: Left arm, Patient Position: Sitting, Cuff Size: Adult)   Pulse 64   Temp 97.7 °F (36.5 °C) (Temporal)   Ht 5' 1\" (1.549 m)   Wt 67.5 kg (148 lb 12.8 oz)   SpO2 99%   BMI 28.12 kg/m²      Physical Exam  Vitals and nursing note reviewed.   Constitutional:       General: She is not in acute distress.     Appearance: She is well-developed.   HENT:      Head: Normocephalic and atraumatic.     Eyes:      General: No visual field deficit.     Conjunctiva/sclera: Conjunctivae normal.       Cardiovascular:      Rate and Rhythm: Normal rate and regular rhythm.      Heart sounds: No murmur heard.  Pulmonary:      Effort: Pulmonary effort is normal. No respiratory distress.      Breath sounds: Normal breath sounds.   Abdominal:      Palpations: Abdomen is soft.      Tenderness: There is no abdominal tenderness.     Musculoskeletal:         General: No swelling.      Cervical back: Neck supple.     Skin:     General: Skin is warm and dry.      Capillary Refill: Capillary refill takes less than 2 seconds.     Neurological:      Mental Status: She is alert.      Cranial Nerves: Cranial nerve deficit present. No dysarthria or facial asymmetry.      Motor: Motor function is intact.      Comments: Decreased sensation to light touch along cranial nerve " V2 on the right. Otherwise no cranial nerve deficits.    Psychiatric:         Mood and Affect: Mood normal.

## 2025-07-07 RX ORDER — MAGNESIUM OXIDE 400 MG/1
400 TABLET ORAL DAILY
Qty: 90 TABLET | Refills: 1 | Status: SHIPPED | OUTPATIENT
Start: 2025-07-07

## 2025-07-08 ENCOUNTER — TELEPHONE (OUTPATIENT)
Age: 75
End: 2025-07-08

## 2025-07-08 NOTE — TELEPHONE ENCOUNTER
Patient called to schedule np appt for right sided facial numbness, she is now scheduled 10/21/25 in [facility]

## 2025-07-09 ENCOUNTER — HOSPITAL ENCOUNTER (OUTPATIENT)
Dept: CT IMAGING | Facility: HOSPITAL | Age: 75
Discharge: HOME | End: 2025-07-09
Payer: MEDICARE

## 2025-07-09 DIAGNOSIS — R20.0 RIGHT FACIAL NUMBNESS: ICD-10-CM

## 2025-07-09 PROCEDURE — 70450 CT HEAD/BRAIN W/O DYE: CPT

## 2025-07-15 ENCOUNTER — TELEPHONE (OUTPATIENT)
Age: 75
End: 2025-07-15

## 2025-07-16 NOTE — TELEPHONE ENCOUNTER
Left message on machine for patient. There was a available opening with Dr. Jiang  on 7/17 @ 8:45 I did schedule patient for that time. Left message on machine for her to call office back to confirm she will take that appointment.

## 2025-07-17 ENCOUNTER — OFFICE VISIT (OUTPATIENT)
Age: 75
End: 2025-07-17
Payer: MEDICARE

## 2025-07-17 VITALS
HEIGHT: 61 IN | SYSTOLIC BLOOD PRESSURE: 120 MMHG | HEART RATE: 62 BPM | WEIGHT: 150.6 LBS | OXYGEN SATURATION: 98 % | TEMPERATURE: 97.4 F | DIASTOLIC BLOOD PRESSURE: 70 MMHG | BODY MASS INDEX: 28.43 KG/M2

## 2025-07-17 DIAGNOSIS — J32.0 RIGHT MAXILLARY SINUSITIS: Primary | ICD-10-CM

## 2025-07-17 PROCEDURE — G2211 COMPLEX E/M VISIT ADD ON: HCPCS | Performed by: INTERNAL MEDICINE

## 2025-07-17 PROCEDURE — 99213 OFFICE O/P EST LOW 20 MIN: CPT | Performed by: INTERNAL MEDICINE

## 2025-07-17 RX ORDER — MOMETASONE FUROATE MONOHYDRATE 50 UG/1
2 SPRAY, METERED NASAL DAILY
Qty: 17 G | Refills: 1 | Status: SHIPPED | OUTPATIENT
Start: 2025-07-17

## 2025-07-17 RX ORDER — DOXYCYCLINE 100 MG/1
100 CAPSULE ORAL EVERY 12 HOURS SCHEDULED
Qty: 28 CAPSULE | Refills: 0 | Status: SHIPPED | OUTPATIENT
Start: 2025-07-17 | End: 2025-07-31

## 2025-07-17 RX ORDER — CLINDAMYCIN HYDROCHLORIDE 300 MG/1
CAPSULE ORAL
COMMUNITY
Start: 2025-07-08 | End: 2025-07-17

## 2025-07-17 NOTE — PROGRESS NOTES
"Name: Mirna Boswell      : 1950      MRN: [id number]  Encounter Provider: Usama Jiang MD  Encounter Date: 2025   Encounter department: [facility]  :  Assessment & Plan  Right maxillary sinusitis  Will initiate antibiotic therapy.  Patient was referred to ENT she has made ointment and is scheduled for early August.  Orders:    doxycycline hyclate (VIBRAMYCIN) 100 mg capsule; Take 1 capsule (100 mg total) by mouth every 12 (twelve) hours for 14 days    mometasone (NASONEX) 50 mcg/act nasal spray; 2 sprays into each nostril daily           History of Present Illness   Patient presents to the office for follow-up of recent CAT scan which disclosed a right maxillary sinusitis.  She was originally complaining of numbness and tingling on the right side of her face.  She has not experienced any fevers or chills.  She denies any sore throat.  She denies any earaches.  She does complain of some discomfort when she is brushing her teeth on the upper right side.      Review of Systems   Constitutional: Negative.    HENT:  Positive for sinus pressure.    Eyes: Negative.    Respiratory: Negative.     Cardiovascular: Negative.    Gastrointestinal: Negative.    Endocrine: Negative.    Genitourinary: Negative.    Musculoskeletal: Negative.    Skin: Negative.    Allergic/Immunologic: Negative.    Neurological: Negative.    Hematological: Negative.    Psychiatric/Behavioral: Negative.         Objective   /70 (BP Location: Left arm, Patient Position: Sitting, Cuff Size: Standard)   Pulse 62   Temp (!) 97.4 °F (36.3 °C) (Temporal)   Ht 5' 1\" (1.549 m)   Wt 68.3 kg (150 lb 9.6 oz)   SpO2 98%   BMI 28.46 kg/m²      Physical Exam  Vitals reviewed.   Constitutional:       General: She is not in acute distress.     Appearance: Normal appearance. She is not ill-appearing, toxic-appearing or diaphoretic.   HENT:      Head: Normocephalic and atraumatic.      Right Ear: " Tympanic membrane, ear canal and external ear normal.      Left Ear: Tympanic membrane and external ear normal.      Nose: No nasal deformity, septal deviation, mucosal edema or congestion.      Right Turbinates: Not enlarged, swollen or pale.      Left Turbinates: Not enlarged, swollen or pale.      Right Sinus: Maxillary sinus tenderness present. No frontal sinus tenderness.      Left Sinus: No maxillary sinus tenderness or frontal sinus tenderness.     Eyes:      Conjunctiva/sclera: Conjunctivae normal.      Pupils: Pupils are equal, round, and reactive to light.       Cardiovascular:      Rate and Rhythm: Normal rate and regular rhythm.      Heart sounds: Normal heart sounds. No murmur heard.  Pulmonary:      Effort: Pulmonary effort is normal. No respiratory distress.   Abdominal:      General: Abdomen is flat. There is no distension.     Musculoskeletal:      Cervical back: Neck supple.      Right lower leg: No edema.      Left lower leg: No edema.     Skin:     General: Skin is warm.      Coloration: Skin is not jaundiced.      Findings: No bruising, erythema or rash.     Neurological:      General: No focal deficit present.      Mental Status: She is alert.     Psychiatric:         Mood and Affect: Mood normal.         Behavior: Behavior normal.

## 2025-07-17 NOTE — ASSESSMENT & PLAN NOTE
Will initiate antibiotic therapy.  Patient was referred to ENT she has made ointment and is scheduled for early August.  Orders:    doxycycline hyclate (VIBRAMYCIN) 100 mg capsule; Take 1 capsule (100 mg total) by mouth every 12 (twelve) hours for 14 days    mometasone (NASONEX) 50 mcg/act nasal spray; 2 sprays into each nostril daily

## 2025-08-04 DIAGNOSIS — R51.9 RIGHT SIDED TEMPORAL HEADACHE: ICD-10-CM

## 2025-08-06 RX ORDER — GABAPENTIN 300 MG/1
300 CAPSULE ORAL DAILY
Qty: 30 CAPSULE | Refills: 5 | Status: SHIPPED | OUTPATIENT
Start: 2025-08-06

## 2025-08-16 ENCOUNTER — HOSPITAL ENCOUNTER (OUTPATIENT)
Dept: CT IMAGING | Facility: HOSPITAL | Age: 75
Discharge: HOME | End: 2025-08-16
Attending: OTOLARYNGOLOGY
Payer: MEDICARE

## 2025-08-16 PROBLEM — J32.0 RIGHT MAXILLARY SINUSITIS: Status: RESOLVED | Noted: 2025-07-17 | Resolved: 2025-08-16

## 2025-08-16 PROCEDURE — 70486 CT MAXILLOFACIAL W/O DYE: CPT

## 2025-08-20 ENCOUNTER — ANESTHESIA (OUTPATIENT)
Dept: PERIOP | Facility: HOSPITAL | Age: 75
End: 2025-08-20
Payer: MEDICARE

## 2025-08-20 ENCOUNTER — ANESTHESIA EVENT (OUTPATIENT)
Dept: PERIOP | Facility: HOSPITAL | Age: 75
End: 2025-08-20
Payer: MEDICARE

## 2025-08-20 ENCOUNTER — HOSPITAL ENCOUNTER (OUTPATIENT)
Facility: HOSPITAL | Age: 75
Setting detail: OUTPATIENT SURGERY
Discharge: HOME | End: 2025-08-20
Attending: OTOLARYNGOLOGY | Admitting: OTOLARYNGOLOGY
Payer: MEDICARE

## 2025-08-20 VITALS
HEIGHT: 62 IN | BODY MASS INDEX: 27.63 KG/M2 | TEMPERATURE: 97.4 F | WEIGHT: 150.13 LBS | DIASTOLIC BLOOD PRESSURE: 64 MMHG | SYSTOLIC BLOOD PRESSURE: 149 MMHG | OXYGEN SATURATION: 98 % | RESPIRATION RATE: 23 BRPM | HEART RATE: 69 BPM

## 2025-08-20 DIAGNOSIS — Z98.890 S/P FESS (FUNCTIONAL ENDOSCOPIC SINUS SURGERY): Primary | ICD-10-CM

## 2025-08-20 DIAGNOSIS — J32.4 CHRONIC PANSINUSITIS: ICD-10-CM

## 2025-08-20 DIAGNOSIS — J32.0 CHRONIC MAXILLARY SINUSITIS: ICD-10-CM

## 2025-08-20 PROCEDURE — 87075 CULTR BACTERIA EXCEPT BLOOD: CPT | Performed by: OTOLARYNGOLOGY

## 2025-08-20 PROCEDURE — 87205 SMEAR GRAM STAIN: CPT | Performed by: OTOLARYNGOLOGY

## 2025-08-20 PROCEDURE — 87070 CULTURE OTHR SPECIMN AEROBIC: CPT | Performed by: OTOLARYNGOLOGY

## 2025-08-20 PROCEDURE — 87102 FUNGUS ISOLATION CULTURE: CPT | Performed by: OTOLARYNGOLOGY

## 2025-08-20 RX ORDER — HYDROCODONE BITARTRATE AND ACETAMINOPHEN 5; 325 MG/1; MG/1
1 TABLET ORAL EVERY 6 HOURS PRN
Qty: 15 TABLET | Refills: 0 | Status: SHIPPED | OUTPATIENT
Start: 2025-08-20 | End: 2025-08-30

## 2025-08-20 RX ORDER — PROMETHAZINE HYDROCHLORIDE 25 MG/ML
6.25 INJECTION, SOLUTION INTRAMUSCULAR; INTRAVENOUS ONCE AS NEEDED
Status: DISCONTINUED | OUTPATIENT
Start: 2025-08-20 | End: 2025-08-20 | Stop reason: HOSPADM

## 2025-08-20 RX ORDER — FENTANYL CITRATE 50 UG/ML
INJECTION, SOLUTION INTRAMUSCULAR; INTRAVENOUS AS NEEDED
Status: DISCONTINUED | OUTPATIENT
Start: 2025-08-20 | End: 2025-08-20

## 2025-08-20 RX ORDER — ACETAMINOPHEN 10 MG/ML
1000 INJECTION, SOLUTION INTRAVENOUS ONCE AS NEEDED
Status: COMPLETED | OUTPATIENT
Start: 2025-08-20 | End: 2025-08-20

## 2025-08-20 RX ORDER — OXYMETAZOLINE HYDROCHLORIDE 0.05 G/100ML
SPRAY NASAL AS NEEDED
Status: DISCONTINUED | OUTPATIENT
Start: 2025-08-20 | End: 2025-08-20 | Stop reason: HOSPADM

## 2025-08-20 RX ORDER — ROCURONIUM BROMIDE 10 MG/ML
INJECTION, SOLUTION INTRAVENOUS AS NEEDED
Status: DISCONTINUED | OUTPATIENT
Start: 2025-08-20 | End: 2025-08-20

## 2025-08-20 RX ORDER — FENTANYL CITRATE/PF 50 MCG/ML
25 SYRINGE (ML) INJECTION
Refills: 0 | Status: DISCONTINUED | OUTPATIENT
Start: 2025-08-20 | End: 2025-08-20 | Stop reason: HOSPADM

## 2025-08-20 RX ORDER — PROPOFOL 10 MG/ML
INJECTION, EMULSION INTRAVENOUS CONTINUOUS PRN
Status: DISCONTINUED | OUTPATIENT
Start: 2025-08-20 | End: 2025-08-20

## 2025-08-20 RX ORDER — MAGNESIUM HYDROXIDE 1200 MG/15ML
LIQUID ORAL AS NEEDED
Status: DISCONTINUED | OUTPATIENT
Start: 2025-08-20 | End: 2025-08-20 | Stop reason: HOSPADM

## 2025-08-20 RX ORDER — ONDANSETRON 2 MG/ML
INJECTION INTRAMUSCULAR; INTRAVENOUS AS NEEDED
Status: DISCONTINUED | OUTPATIENT
Start: 2025-08-20 | End: 2025-08-20

## 2025-08-20 RX ORDER — ACETAMINOPHEN 325 MG/1
650 TABLET ORAL EVERY 6 HOURS PRN
Status: DISCONTINUED | OUTPATIENT
Start: 2025-08-20 | End: 2025-08-20 | Stop reason: HOSPADM

## 2025-08-20 RX ORDER — ONDANSETRON 4 MG/1
4 TABLET, ORALLY DISINTEGRATING ORAL EVERY 6 HOURS PRN
Status: DISCONTINUED | OUTPATIENT
Start: 2025-08-20 | End: 2025-08-20 | Stop reason: HOSPADM

## 2025-08-20 RX ORDER — EPINEPHRINE NASAL SOLUTION 1 MG/ML
SOLUTION NASAL AS NEEDED
Status: DISCONTINUED | OUTPATIENT
Start: 2025-08-20 | End: 2025-08-20 | Stop reason: HOSPADM

## 2025-08-20 RX ORDER — OXYCODONE HYDROCHLORIDE 5 MG/1
5 TABLET ORAL EVERY 4 HOURS PRN
Status: DISCONTINUED | OUTPATIENT
Start: 2025-08-20 | End: 2025-08-20 | Stop reason: HOSPADM

## 2025-08-20 RX ORDER — LIDOCAINE HYDROCHLORIDE 10 MG/ML
INJECTION, SOLUTION EPIDURAL; INFILTRATION; INTRACAUDAL; PERINEURAL AS NEEDED
Status: DISCONTINUED | OUTPATIENT
Start: 2025-08-20 | End: 2025-08-20

## 2025-08-20 RX ORDER — DEXAMETHASONE SODIUM PHOSPHATE 10 MG/ML
INJECTION, SOLUTION INTRAMUSCULAR; INTRAVENOUS AS NEEDED
Status: DISCONTINUED | OUTPATIENT
Start: 2025-08-20 | End: 2025-08-20

## 2025-08-20 RX ORDER — SODIUM CHLORIDE, SODIUM LACTATE, POTASSIUM CHLORIDE, CALCIUM CHLORIDE 600; 310; 30; 20 MG/100ML; MG/100ML; MG/100ML; MG/100ML
INJECTION, SOLUTION INTRAVENOUS CONTINUOUS PRN
Status: DISCONTINUED | OUTPATIENT
Start: 2025-08-20 | End: 2025-08-20

## 2025-08-20 RX ORDER — PROPOFOL 10 MG/ML
INJECTION, EMULSION INTRAVENOUS AS NEEDED
Status: DISCONTINUED | OUTPATIENT
Start: 2025-08-20 | End: 2025-08-20

## 2025-08-20 RX ORDER — DOXYCYCLINE 100 MG/1
100 CAPSULE ORAL EVERY 12 HOURS SCHEDULED
Qty: 28 CAPSULE | Refills: 0 | Status: SHIPPED | OUTPATIENT
Start: 2025-08-20 | End: 2025-09-03

## 2025-08-20 RX ADMIN — SODIUM CHLORIDE, SODIUM LACTATE, POTASSIUM CHLORIDE, AND CALCIUM CHLORIDE: .6; .31; .03; .02 INJECTION, SOLUTION INTRAVENOUS at 10:15

## 2025-08-20 RX ADMIN — ROCURONIUM BROMIDE 50 MG: 10 INJECTION, SOLUTION INTRAVENOUS at 10:28

## 2025-08-20 RX ADMIN — FENTANYL CITRATE 50 MCG: 50 INJECTION, SOLUTION INTRAMUSCULAR; INTRAVENOUS at 10:27

## 2025-08-20 RX ADMIN — ONDANSETRON 4 MG: 4 TABLET, ORALLY DISINTEGRATING ORAL at 13:30

## 2025-08-20 RX ADMIN — ACETAMINOPHEN 1000 MG: 1000 INJECTION, SOLUTION INTRAVENOUS at 12:44

## 2025-08-20 RX ADMIN — SUGAMMADEX 200 MG: 100 INJECTION, SOLUTION INTRAVENOUS at 12:22

## 2025-08-20 RX ADMIN — ONDANSETRON 4 MG: 2 INJECTION INTRAMUSCULAR; INTRAVENOUS at 10:49

## 2025-08-20 RX ADMIN — FENTANYL CITRATE 25 MCG: 50 INJECTION, SOLUTION INTRAMUSCULAR; INTRAVENOUS at 11:48

## 2025-08-20 RX ADMIN — DEXAMETHASONE SODIUM PHOSPHATE 10 MG: 10 INJECTION, SOLUTION INTRAMUSCULAR; INTRAVENOUS at 10:28

## 2025-08-20 RX ADMIN — ROCURONIUM BROMIDE 10 MG: 10 INJECTION, SOLUTION INTRAVENOUS at 11:40

## 2025-08-20 RX ADMIN — FENTANYL CITRATE 25 MCG: 50 INJECTION INTRAMUSCULAR; INTRAVENOUS at 12:56

## 2025-08-20 RX ADMIN — ONDANSETRON 4 MG: 2 INJECTION INTRAMUSCULAR; INTRAVENOUS at 12:18

## 2025-08-20 RX ADMIN — FENTANYL CITRATE 25 MCG: 50 INJECTION INTRAMUSCULAR; INTRAVENOUS at 12:43

## 2025-08-20 RX ADMIN — PHENYLEPHRINE HYDROCHLORIDE 40 MCG/MIN: 10 INJECTION INTRAVENOUS at 10:55

## 2025-08-20 RX ADMIN — PROPOFOL 50 MCG/KG/MIN: 10 INJECTION, EMULSION INTRAVENOUS at 10:35

## 2025-08-20 RX ADMIN — FENTANYL CITRATE 25 MCG: 50 INJECTION, SOLUTION INTRAMUSCULAR; INTRAVENOUS at 11:40

## 2025-08-20 RX ADMIN — PROPOFOL 100 MG: 10 INJECTION, EMULSION INTRAVENOUS at 10:27

## 2025-08-20 RX ADMIN — LIDOCAINE HYDROCHLORIDE 50 MG: 10 INJECTION, SOLUTION EPIDURAL; INFILTRATION; INTRACAUDAL at 10:27

## 2025-08-23 LAB
BACTERIA SPEC ANAEROBE CULT: NO GROWTH
BACTERIA TISS AEROBE CULT: NO GROWTH
GRAM STN SPEC: NORMAL

## 2025-08-25 LAB — FUNGUS SPEC CULT: NORMAL

## (undated) DEVICE — Device

## (undated) DEVICE — SPLINT INTRANASAL 0.6 X 2 IN POSISEP X

## (undated) DEVICE — GLOVE SRG BIOGEL 7.5

## (undated) DEVICE — TRAY PAIN SUPPORT

## (undated) DEVICE — CHLORAPREP APPLICATOR TINTED 10.5ML ONE-STEP

## (undated) DEVICE — SYRINGE EPI 8ML LUER SLIP LOSS OF RESISTANCE PLASTIC PERFIX

## (undated) DEVICE — IV SET EXT SM BORE CARESITE 8IN

## (undated) DEVICE — TOWEL SET X-RAY

## (undated) DEVICE — PLASTIC ADHESIVE BANDAGE: Brand: CURITY

## (undated) DEVICE — TRAY EPIDURAL PERIFIX 20GA X 3.5IN TUOHY 8ML

## (undated) DEVICE — SYRINGE 5ML LL

## (undated) DEVICE — RADIOLOGY STERILE LABELS: Brand: CENTURION

## (undated) DEVICE — PACK PBDS NASAL RF

## (undated) DEVICE — BLADE SHAVER OD4.3 MM L13 CM QUADCUT STRAIGHTSHOT M4